# Patient Record
Sex: MALE | Race: WHITE | Employment: OTHER | URBAN - METROPOLITAN AREA
[De-identification: names, ages, dates, MRNs, and addresses within clinical notes are randomized per-mention and may not be internally consistent; named-entity substitution may affect disease eponyms.]

---

## 2017-01-30 ENCOUNTER — TELEPHONE (OUTPATIENT)
Dept: INTERNAL MEDICINE CLINIC | Age: 82
End: 2017-01-30

## 2017-03-27 RX ORDER — PREDNISONE 20 MG/1
TABLET ORAL
Qty: 60 TABLET | Refills: 4 | Status: SHIPPED | OUTPATIENT
Start: 2017-03-27 | End: 2017-04-25 | Stop reason: SDUPTHER

## 2017-04-25 ENCOUNTER — TELEPHONE (OUTPATIENT)
Dept: INTERNAL MEDICINE CLINIC | Age: 82
End: 2017-04-25

## 2017-04-25 RX ORDER — PREDNISONE 20 MG/1
TABLET ORAL
Qty: 60 TABLET | Refills: 2 | Status: SHIPPED | OUTPATIENT
Start: 2017-04-25 | End: 2017-04-26 | Stop reason: SDUPTHER

## 2017-04-26 ENCOUNTER — TELEPHONE (OUTPATIENT)
Dept: INTERNAL MEDICINE CLINIC | Age: 82
End: 2017-04-26

## 2017-04-26 RX ORDER — PREDNISONE 20 MG/1
TABLET ORAL
Qty: 120 TABLET | Refills: 2 | Status: SHIPPED | OUTPATIENT
Start: 2017-04-26 | End: 2017-04-26 | Stop reason: SDUPTHER

## 2017-04-26 RX ORDER — PREDNISONE 20 MG/1
TABLET ORAL
Qty: 120 TABLET | Refills: 2 | Status: ON HOLD | OUTPATIENT
Start: 2017-04-26 | End: 2018-05-02 | Stop reason: HOSPADM

## 2018-04-18 PROBLEM — I95.9 HYPOTENSION: Status: ACTIVE | Noted: 2018-04-18

## 2018-04-18 PROBLEM — D50.0 BLOOD LOSS ANEMIA: Status: ACTIVE | Noted: 2018-04-18

## 2018-04-18 PROBLEM — Z87.19 HISTORY OF ESOPHAGEAL STRICTURE: Status: ACTIVE | Noted: 2018-04-18

## 2018-04-18 PROBLEM — K92.2 GI BLEEDING: Status: ACTIVE | Noted: 2018-04-18

## 2018-04-18 PROBLEM — I45.2 RBBB (RIGHT BUNDLE BRANCH BLOCK WITH LEFT ANTERIOR FASCICULAR BLOCK): Status: ACTIVE | Noted: 2018-04-18

## 2018-04-19 ENCOUNTER — TELEPHONE (OUTPATIENT)
Dept: INTERNAL MEDICINE CLINIC | Age: 83
End: 2018-04-19

## 2018-04-19 PROBLEM — K26.9 DUODENAL ULCER: Status: ACTIVE | Noted: 2018-04-19

## 2018-04-19 PROBLEM — D50.0 BLOOD LOSS ANEMIA: Status: RESOLVED | Noted: 2018-04-18 | Resolved: 2018-04-19

## 2018-04-20 ENCOUNTER — TELEPHONE (OUTPATIENT)
Dept: INTERNAL MEDICINE CLINIC | Age: 83
End: 2018-04-20

## 2018-04-23 PROBLEM — K92.2 GI BLEEDING: Chronic | Status: ACTIVE | Noted: 2018-04-18

## 2018-04-23 PROBLEM — K92.2 UGI BLEED: Status: ACTIVE | Noted: 2018-04-23

## 2018-04-25 ENCOUNTER — TELEPHONE (OUTPATIENT)
Dept: INTERNAL MEDICINE CLINIC | Age: 83
End: 2018-04-25

## 2018-04-26 PROBLEM — J96.01 ACUTE HYPOXEMIC RESPIRATORY FAILURE (HCC): Status: ACTIVE | Noted: 2018-04-26

## 2018-05-01 PROBLEM — J96.01 ACUTE HYPOXEMIC RESPIRATORY FAILURE (HCC): Status: RESOLVED | Noted: 2018-04-26 | Resolved: 2018-05-01

## 2018-05-01 PROBLEM — I47.29 NON-SUSTAINED VENTRICULAR TACHYCARDIA: Status: ACTIVE | Noted: 2018-05-01

## 2018-05-02 ENCOUNTER — TELEPHONE (OUTPATIENT)
Dept: INTERNAL MEDICINE CLINIC | Age: 83
End: 2018-05-02

## 2018-05-03 ENCOUNTER — CARE COORDINATION (OUTPATIENT)
Dept: CASE MANAGEMENT | Age: 83
End: 2018-05-03

## 2018-05-03 ENCOUNTER — TELEPHONE (OUTPATIENT)
Dept: INTERNAL MEDICINE CLINIC | Age: 83
End: 2018-05-03

## 2018-05-09 ENCOUNTER — CARE COORDINATION (OUTPATIENT)
Dept: CASE MANAGEMENT | Age: 83
End: 2018-05-09

## 2018-05-15 ENCOUNTER — CARE COORDINATION (OUTPATIENT)
Dept: CASE MANAGEMENT | Age: 83
End: 2018-05-15

## 2018-05-18 RX ORDER — ATORVASTATIN CALCIUM 10 MG/1
10 TABLET, FILM COATED ORAL DAILY
Qty: 30 TABLET | Refills: 5 | Status: SHIPPED | OUTPATIENT
Start: 2018-05-18 | End: 2018-09-26 | Stop reason: SDUPTHER

## 2018-05-18 RX ORDER — METOPROLOL SUCCINATE 25 MG/1
25 TABLET, EXTENDED RELEASE ORAL DAILY
Qty: 30 TABLET | Refills: 5 | Status: SHIPPED | OUTPATIENT
Start: 2018-05-18 | End: 2018-09-26 | Stop reason: SDUPTHER

## 2018-05-23 ENCOUNTER — OFFICE VISIT (OUTPATIENT)
Dept: INTERNAL MEDICINE CLINIC | Age: 83
End: 2018-05-23

## 2018-05-23 VITALS
SYSTOLIC BLOOD PRESSURE: 138 MMHG | BODY MASS INDEX: 27.88 KG/M2 | DIASTOLIC BLOOD PRESSURE: 74 MMHG | HEART RATE: 70 BPM | OXYGEN SATURATION: 95 % | WEIGHT: 178 LBS | RESPIRATION RATE: 16 BRPM

## 2018-05-23 DIAGNOSIS — D62 ACUTE BLOOD LOSS ANEMIA: ICD-10-CM

## 2018-05-23 DIAGNOSIS — K26.9 DUODENAL ULCER: ICD-10-CM

## 2018-05-23 DIAGNOSIS — J18.9 PNEUMONIA OF LEFT LOWER LOBE DUE TO INFECTIOUS ORGANISM: ICD-10-CM

## 2018-05-23 DIAGNOSIS — K92.2 UGI BLEED: Primary | ICD-10-CM

## 2018-05-23 LAB
BASOPHILS ABSOLUTE: 0 K/UL (ref 0–0.2)
BASOPHILS RELATIVE PERCENT: 0.7 %
EOSINOPHILS ABSOLUTE: 0.2 K/UL (ref 0–0.6)
EOSINOPHILS RELATIVE PERCENT: 6.9 %
HCT VFR BLD CALC: 28.6 % (ref 40.5–52.5)
HEMOGLOBIN: 9.3 G/DL (ref 13.5–17.5)
LYMPHOCYTES ABSOLUTE: 1 K/UL (ref 1–5.1)
LYMPHOCYTES RELATIVE PERCENT: 31.9 %
MCH RBC QN AUTO: 27.2 PG (ref 26–34)
MCHC RBC AUTO-ENTMCNC: 32.6 G/DL (ref 31–36)
MCV RBC AUTO: 83.6 FL (ref 80–100)
MONOCYTES ABSOLUTE: 0.3 K/UL (ref 0–1.3)
MONOCYTES RELATIVE PERCENT: 8.7 %
NEUTROPHILS ABSOLUTE: 1.7 K/UL (ref 1.7–7.7)
NEUTROPHILS RELATIVE PERCENT: 51.8 %
PDW BLD-RTO: 24.2 % (ref 12.4–15.4)
PLATELET # BLD: 155 K/UL (ref 135–450)
PMV BLD AUTO: 8.6 FL (ref 5–10.5)
RBC # BLD: 3.42 M/UL (ref 4.2–5.9)
WBC # BLD: 3.3 K/UL (ref 4–11)

## 2018-05-23 PROCEDURE — 1036F TOBACCO NON-USER: CPT | Performed by: INTERNAL MEDICINE

## 2018-05-23 PROCEDURE — G8510 SCR DEP NEG, NO PLAN REQD: HCPCS | Performed by: INTERNAL MEDICINE

## 2018-05-23 PROCEDURE — 4040F PNEUMOC VAC/ADMIN/RCVD: CPT | Performed by: INTERNAL MEDICINE

## 2018-05-23 PROCEDURE — 3288F FALL RISK ASSESSMENT DOCD: CPT | Performed by: INTERNAL MEDICINE

## 2018-05-23 PROCEDURE — G8419 CALC BMI OUT NRM PARAM NOF/U: HCPCS | Performed by: INTERNAL MEDICINE

## 2018-05-23 PROCEDURE — 99213 OFFICE O/P EST LOW 20 MIN: CPT | Performed by: INTERNAL MEDICINE

## 2018-05-23 PROCEDURE — 1123F ACP DISCUSS/DSCN MKR DOCD: CPT | Performed by: INTERNAL MEDICINE

## 2018-05-23 PROCEDURE — G8427 DOCREV CUR MEDS BY ELIG CLIN: HCPCS | Performed by: INTERNAL MEDICINE

## 2018-05-23 PROCEDURE — 36415 COLL VENOUS BLD VENIPUNCTURE: CPT | Performed by: INTERNAL MEDICINE

## 2018-05-23 PROCEDURE — 1111F DSCHRG MED/CURRENT MED MERGE: CPT | Performed by: INTERNAL MEDICINE

## 2018-05-23 ASSESSMENT — PATIENT HEALTH QUESTIONNAIRE - PHQ9
SUM OF ALL RESPONSES TO PHQ9 QUESTIONS 1 & 2: 0
2. FEELING DOWN, DEPRESSED OR HOPELESS: 0
1. LITTLE INTEREST OR PLEASURE IN DOING THINGS: 0
SUM OF ALL RESPONSES TO PHQ QUESTIONS 1-9: 0

## 2018-05-23 ASSESSMENT — ENCOUNTER SYMPTOMS
RESPIRATORY NEGATIVE: 1
GASTROINTESTINAL NEGATIVE: 1

## 2018-09-26 RX ORDER — METOPROLOL SUCCINATE 25 MG/1
TABLET, EXTENDED RELEASE ORAL
Qty: 30 TABLET | Refills: 5 | Status: SHIPPED | OUTPATIENT
Start: 2018-09-26 | End: 2019-06-10 | Stop reason: SDUPTHER

## 2018-09-26 RX ORDER — ATORVASTATIN CALCIUM 10 MG/1
TABLET, FILM COATED ORAL
Qty: 30 TABLET | Refills: 5 | Status: ON HOLD | OUTPATIENT
Start: 2018-09-26 | End: 2019-05-15 | Stop reason: SDUPTHER

## 2018-11-21 ENCOUNTER — TELEPHONE (OUTPATIENT)
Dept: INTERNAL MEDICINE CLINIC | Age: 83
End: 2018-11-21

## 2019-01-28 ENCOUNTER — OFFICE VISIT (OUTPATIENT)
Dept: INTERNAL MEDICINE CLINIC | Age: 84
End: 2019-01-28
Payer: MEDICARE

## 2019-01-28 VITALS
RESPIRATION RATE: 16 BRPM | HEART RATE: 106 BPM | SYSTOLIC BLOOD PRESSURE: 122 MMHG | WEIGHT: 186 LBS | BODY MASS INDEX: 29.19 KG/M2 | DIASTOLIC BLOOD PRESSURE: 80 MMHG | HEIGHT: 67 IN | OXYGEN SATURATION: 98 %

## 2019-01-28 DIAGNOSIS — Z11.1 VISIT FOR TB SKIN TEST: ICD-10-CM

## 2019-01-28 DIAGNOSIS — M19.90 ARTHRITIS: ICD-10-CM

## 2019-01-28 DIAGNOSIS — G70.00 MYASTHENIA GRAVIS (HCC): ICD-10-CM

## 2019-01-28 DIAGNOSIS — Z02.2 ENCOUNTER FOR EXAMINATION FOR ADMISSION TO NURSING HOME: Primary | ICD-10-CM

## 2019-01-28 DIAGNOSIS — E78.2 MIXED HYPERLIPIDEMIA: ICD-10-CM

## 2019-01-28 DIAGNOSIS — Q61.3 POLYCYSTIC KIDNEY: ICD-10-CM

## 2019-01-28 DIAGNOSIS — Z87.19 HISTORY OF ESOPHAGEAL STRICTURE: ICD-10-CM

## 2019-01-28 DIAGNOSIS — I10 ESSENTIAL HYPERTENSION: ICD-10-CM

## 2019-01-28 PROCEDURE — G8427 DOCREV CUR MEDS BY ELIG CLIN: HCPCS | Performed by: INTERNAL MEDICINE

## 2019-01-28 PROCEDURE — 99214 OFFICE O/P EST MOD 30 MIN: CPT | Performed by: INTERNAL MEDICINE

## 2019-01-28 PROCEDURE — 86580 TB INTRADERMAL TEST: CPT | Performed by: INTERNAL MEDICINE

## 2019-01-28 PROCEDURE — 1036F TOBACCO NON-USER: CPT | Performed by: INTERNAL MEDICINE

## 2019-01-28 PROCEDURE — 4040F PNEUMOC VAC/ADMIN/RCVD: CPT | Performed by: INTERNAL MEDICINE

## 2019-01-28 PROCEDURE — 1123F ACP DISCUSS/DSCN MKR DOCD: CPT | Performed by: INTERNAL MEDICINE

## 2019-01-28 PROCEDURE — G8484 FLU IMMUNIZE NO ADMIN: HCPCS | Performed by: INTERNAL MEDICINE

## 2019-01-28 PROCEDURE — 1101F PT FALLS ASSESS-DOCD LE1/YR: CPT | Performed by: INTERNAL MEDICINE

## 2019-01-28 PROCEDURE — G8419 CALC BMI OUT NRM PARAM NOF/U: HCPCS | Performed by: INTERNAL MEDICINE

## 2019-01-28 RX ORDER — METOPROLOL SUCCINATE 25 MG/1
TABLET, EXTENDED RELEASE ORAL
Qty: 30 TABLET | Refills: 5 | Status: CANCELLED | OUTPATIENT
Start: 2019-01-28

## 2019-01-28 RX ORDER — PREDNISONE 20 MG/1
20 TABLET ORAL DAILY PRN
Qty: 90 TABLET | Refills: 1 | Status: ON HOLD | OUTPATIENT
Start: 2019-01-28 | End: 2019-05-09 | Stop reason: HOSPADM

## 2019-01-28 RX ORDER — PREDNISONE 20 MG/1
TABLET ORAL
COMMUNITY
End: 2019-01-28 | Stop reason: SDUPTHER

## 2019-01-29 ASSESSMENT — ENCOUNTER SYMPTOMS
BACK PAIN: 1
EYES NEGATIVE: 1
GASTROINTESTINAL NEGATIVE: 1
RESPIRATORY NEGATIVE: 1

## 2019-01-30 ENCOUNTER — TELEPHONE (OUTPATIENT)
Dept: INTERNAL MEDICINE CLINIC | Age: 84
End: 2019-01-30

## 2019-05-07 ENCOUNTER — APPOINTMENT (OUTPATIENT)
Dept: CT IMAGING | Age: 84
DRG: 243 | End: 2019-05-07
Payer: MEDICARE

## 2019-05-07 ENCOUNTER — APPOINTMENT (OUTPATIENT)
Dept: GENERAL RADIOLOGY | Age: 84
DRG: 243 | End: 2019-05-07
Payer: MEDICARE

## 2019-05-07 ENCOUNTER — HOSPITAL ENCOUNTER (INPATIENT)
Age: 84
LOS: 2 days | Discharge: NURSING, REHAB OR LONG-TERM CARE | DRG: 243 | End: 2019-05-09
Attending: EMERGENCY MEDICINE | Admitting: INTERNAL MEDICINE
Payer: MEDICARE

## 2019-05-07 ENCOUNTER — TELEPHONE (OUTPATIENT)
Dept: INTERNAL MEDICINE CLINIC | Age: 84
End: 2019-05-07

## 2019-05-07 DIAGNOSIS — D72.819 LEUKOPENIA, UNSPECIFIED TYPE: ICD-10-CM

## 2019-05-07 DIAGNOSIS — S60.222A CONTUSION OF LEFT HAND, INITIAL ENCOUNTER: ICD-10-CM

## 2019-05-07 DIAGNOSIS — S02.85XA CLOSED FRACTURE OF ORBITAL WALL, INITIAL ENCOUNTER (HCC): ICD-10-CM

## 2019-05-07 DIAGNOSIS — S80.02XA CONTUSION OF LEFT KNEE, INITIAL ENCOUNTER: ICD-10-CM

## 2019-05-07 DIAGNOSIS — S09.90XA CLOSED HEAD INJURY, INITIAL ENCOUNTER: ICD-10-CM

## 2019-05-07 DIAGNOSIS — R55 SYNCOPE AND COLLAPSE: Primary | ICD-10-CM

## 2019-05-07 DIAGNOSIS — S52.125A CLOSED NONDISPLACED FRACTURE OF HEAD OF LEFT RADIUS, INITIAL ENCOUNTER: ICD-10-CM

## 2019-05-07 DIAGNOSIS — R00.1 BRADYCARDIA: ICD-10-CM

## 2019-05-07 DIAGNOSIS — H05.232 PERIORBITAL HEMATOMA OF LEFT EYE: ICD-10-CM

## 2019-05-07 PROBLEM — N18.30 STAGE 3 CHRONIC KIDNEY DISEASE (HCC): Status: ACTIVE | Noted: 2019-05-07

## 2019-05-07 PROBLEM — R73.9 HYPERGLYCEMIA: Status: ACTIVE | Noted: 2019-05-07

## 2019-05-07 PROBLEM — I48.91 ATRIAL FIBRILLATION WITH SLOW VENTRICULAR RESPONSE (HCC): Status: ACTIVE | Noted: 2019-05-07

## 2019-05-07 PROBLEM — D70.9 NEUTROPENIA (HCC): Status: ACTIVE | Noted: 2019-05-07

## 2019-05-07 LAB
A/G RATIO: 1.4 (ref 1.1–2.2)
ALBUMIN SERPL-MCNC: 3.8 G/DL (ref 3.4–5)
ALP BLD-CCNC: 95 U/L (ref 40–129)
ALT SERPL-CCNC: 8 U/L (ref 10–40)
ANION GAP SERPL CALCULATED.3IONS-SCNC: 14 MMOL/L (ref 3–16)
ANISOCYTOSIS: ABNORMAL
AST SERPL-CCNC: 11 U/L (ref 15–37)
BANDED NEUTROPHILS RELATIVE PERCENT: 2 % (ref 0–7)
BASOPHILS ABSOLUTE: 0 K/UL (ref 0–0.2)
BASOPHILS ABSOLUTE: 0 K/UL (ref 0–0.2)
BASOPHILS RELATIVE PERCENT: 0 %
BASOPHILS RELATIVE PERCENT: 0.1 %
BILIRUB SERPL-MCNC: 0.7 MG/DL (ref 0–1)
BILIRUBIN URINE: NEGATIVE
BLOOD, URINE: NEGATIVE
BUN BLDV-MCNC: 26 MG/DL (ref 7–20)
CALCIUM SERPL-MCNC: 8.9 MG/DL (ref 8.3–10.6)
CHLORIDE BLD-SCNC: 109 MMOL/L (ref 99–110)
CLARITY: CLEAR
CO2: 20 MMOL/L (ref 21–32)
COLOR: YELLOW
CREAT SERPL-MCNC: 1.8 MG/DL (ref 0.8–1.3)
CRENATED RBC'S: ABNORMAL
EKG ATRIAL RATE: 39 BPM
EKG DIAGNOSIS: NORMAL
EKG Q-T INTERVAL: 614 MS
EKG QRS DURATION: 134 MS
EKG QTC CALCULATION (BAZETT): 454 MS
EKG R AXIS: -31 DEGREES
EKG T AXIS: -17 DEGREES
EKG VENTRICULAR RATE: 33 BPM
EOSINOPHILS ABSOLUTE: 0 K/UL (ref 0–0.6)
EOSINOPHILS ABSOLUTE: 0 K/UL (ref 0–0.6)
EOSINOPHILS RELATIVE PERCENT: 0 %
EOSINOPHILS RELATIVE PERCENT: 1 %
EPITHELIAL CELLS, UA: 1 /HPF (ref 0–5)
GFR AFRICAN AMERICAN: 43
GFR NON-AFRICAN AMERICAN: 36
GLOBULIN: 2.7 G/DL
GLUCOSE BLD-MCNC: 133 MG/DL (ref 70–99)
GLUCOSE BLD-MCNC: 164 MG/DL (ref 70–99)
GLUCOSE BLD-MCNC: 172 MG/DL (ref 70–99)
GLUCOSE BLD-MCNC: 202 MG/DL (ref 70–99)
GLUCOSE URINE: NEGATIVE MG/DL
HCT VFR BLD CALC: 34.4 % (ref 40.5–52.5)
HCT VFR BLD CALC: 35 % (ref 40.5–52.5)
HEMATOLOGY PATH CONSULT: NORMAL
HEMATOLOGY PATH CONSULT: YES
HEMOGLOBIN: 11.4 G/DL (ref 13.5–17.5)
HEMOGLOBIN: 11.5 G/DL (ref 13.5–17.5)
HYALINE CASTS: 2 /LPF (ref 0–8)
HYPOCHROMIA: ABNORMAL
KETONES, URINE: ABNORMAL MG/DL
LACTIC ACID: 1.8 MMOL/L (ref 0.4–2)
LACTIC ACID: 2.4 MMOL/L (ref 0.4–2)
LEUKOCYTE ESTERASE, URINE: ABNORMAL
LIPASE: 17 U/L (ref 13–60)
LYMPHOCYTES ABSOLUTE: 0.3 K/UL (ref 1–5.1)
LYMPHOCYTES ABSOLUTE: 0.5 K/UL (ref 1–5.1)
LYMPHOCYTES RELATIVE PERCENT: 15 %
LYMPHOCYTES RELATIVE PERCENT: 9.1 %
MAGNESIUM: 1.7 MG/DL (ref 1.8–2.4)
MCH RBC QN AUTO: 28.8 PG (ref 26–34)
MCH RBC QN AUTO: 28.9 PG (ref 26–34)
MCHC RBC AUTO-ENTMCNC: 32.9 G/DL (ref 31–36)
MCHC RBC AUTO-ENTMCNC: 33 G/DL (ref 31–36)
MCV RBC AUTO: 87.1 FL (ref 80–100)
MCV RBC AUTO: 87.6 FL (ref 80–100)
MICROSCOPIC EXAMINATION: YES
MONOCYTES ABSOLUTE: 0 K/UL (ref 0–1.3)
MONOCYTES ABSOLUTE: 0.1 K/UL (ref 0–1.3)
MONOCYTES RELATIVE PERCENT: 1 %
MONOCYTES RELATIVE PERCENT: 2.6 %
NEUTROPHILS ABSOLUTE: 1.4 K/UL (ref 1.7–7.7)
NEUTROPHILS ABSOLUTE: 4.5 K/UL (ref 1.7–7.7)
NEUTROPHILS RELATIVE PERCENT: 81 %
NEUTROPHILS RELATIVE PERCENT: 88.2 %
NITRITE, URINE: NEGATIVE
OCCULT BLOOD DIAGNOSTIC: NORMAL
OVALOCYTES: ABNORMAL
PDW BLD-RTO: 15.5 % (ref 12.4–15.4)
PDW BLD-RTO: 15.9 % (ref 12.4–15.4)
PERFORMED ON: ABNORMAL
PH UA: 6 (ref 5–8)
PHOSPHORUS: 3.8 MG/DL (ref 2.5–4.9)
PLATELET # BLD: 124 K/UL (ref 135–450)
PLATELET # BLD: 130 K/UL (ref 135–450)
PLATELET SLIDE REVIEW: ABNORMAL
PMV BLD AUTO: 9 FL (ref 5–10.5)
PMV BLD AUTO: 9.5 FL (ref 5–10.5)
POIKILOCYTES: ABNORMAL
POTASSIUM REFLEX MAGNESIUM: 4.5 MMOL/L (ref 3.5–5.1)
PRO-BNP: 3133 PG/ML (ref 0–449)
PROTEIN UA: 30 MG/DL
RBC # BLD: 3.95 M/UL (ref 4.2–5.9)
RBC # BLD: 4 M/UL (ref 4.2–5.9)
RBC UA: 3 /HPF (ref 0–4)
SLIDE REVIEW: ABNORMAL
SODIUM BLD-SCNC: 143 MMOL/L (ref 136–145)
SPECIFIC GRAVITY UA: 1.02 (ref 1–1.03)
TOTAL PROTEIN: 6.5 G/DL (ref 6.4–8.2)
TROPONIN: <0.01 NG/ML
URINE REFLEX TO CULTURE: YES
URINE TYPE: ABNORMAL
UROBILINOGEN, URINE: 1 E.U./DL
WBC # BLD: 1.7 K/UL (ref 4–11)
WBC # BLD: 5.1 K/UL (ref 4–11)
WBC UA: 2 /HPF (ref 0–5)

## 2019-05-07 PROCEDURE — 80053 COMPREHEN METABOLIC PANEL: CPT

## 2019-05-07 PROCEDURE — 99223 1ST HOSP IP/OBS HIGH 75: CPT | Performed by: NURSE PRACTITIONER

## 2019-05-07 PROCEDURE — 84100 ASSAY OF PHOSPHORUS: CPT

## 2019-05-07 PROCEDURE — 2500000003 HC RX 250 WO HCPCS

## 2019-05-07 PROCEDURE — C1898 LEAD, PMKR, OTHER THAN TRANS: HCPCS

## 2019-05-07 PROCEDURE — 81001 URINALYSIS AUTO W/SCOPE: CPT

## 2019-05-07 PROCEDURE — 85025 COMPLETE CBC W/AUTO DIFF WBC: CPT

## 2019-05-07 PROCEDURE — 2580000003 HC RX 258: Performed by: INTERNAL MEDICINE

## 2019-05-07 PROCEDURE — 0JH606Z INSERTION OF PACEMAKER, DUAL CHAMBER INTO CHEST SUBCUTANEOUS TISSUE AND FASCIA, OPEN APPROACH: ICD-10-PCS | Performed by: INTERNAL MEDICINE

## 2019-05-07 PROCEDURE — 73130 X-RAY EXAM OF HAND: CPT

## 2019-05-07 PROCEDURE — 96375 TX/PRO/DX INJ NEW DRUG ADDON: CPT

## 2019-05-07 PROCEDURE — 70450 CT HEAD/BRAIN W/O DYE: CPT

## 2019-05-07 PROCEDURE — 6360000002 HC RX W HCPCS: Performed by: INTERNAL MEDICINE

## 2019-05-07 PROCEDURE — 6360000002 HC RX W HCPCS: Performed by: PHYSICIAN ASSISTANT

## 2019-05-07 PROCEDURE — 83605 ASSAY OF LACTIC ACID: CPT

## 2019-05-07 PROCEDURE — 73080 X-RAY EXAM OF ELBOW: CPT

## 2019-05-07 PROCEDURE — 93005 ELECTROCARDIOGRAM TRACING: CPT | Performed by: PHYSICIAN ASSISTANT

## 2019-05-07 PROCEDURE — C1785 PMKR, DUAL, RATE-RESP: HCPCS

## 2019-05-07 PROCEDURE — 93005 ELECTROCARDIOGRAM TRACING: CPT | Performed by: INTERNAL MEDICINE

## 2019-05-07 PROCEDURE — C1894 INTRO/SHEATH, NON-LASER: HCPCS

## 2019-05-07 PROCEDURE — 2000000000 HC ICU R&B

## 2019-05-07 PROCEDURE — 83880 ASSAY OF NATRIURETIC PEPTIDE: CPT

## 2019-05-07 PROCEDURE — 99153 MOD SED SAME PHYS/QHP EA: CPT | Performed by: FAMILY MEDICINE

## 2019-05-07 PROCEDURE — 6370000000 HC RX 637 (ALT 250 FOR IP): Performed by: INTERNAL MEDICINE

## 2019-05-07 PROCEDURE — 2580000003 HC RX 258: Performed by: NURSE PRACTITIONER

## 2019-05-07 PROCEDURE — 72125 CT NECK SPINE W/O DYE: CPT

## 2019-05-07 PROCEDURE — 02H63JZ INSERTION OF PACEMAKER LEAD INTO RIGHT ATRIUM, PERCUTANEOUS APPROACH: ICD-10-PCS | Performed by: INTERNAL MEDICINE

## 2019-05-07 PROCEDURE — G0328 FECAL BLOOD SCRN IMMUNOASSAY: HCPCS

## 2019-05-07 PROCEDURE — 87086 URINE CULTURE/COLONY COUNT: CPT

## 2019-05-07 PROCEDURE — 02HK3JZ INSERTION OF PACEMAKER LEAD INTO RIGHT VENTRICLE, PERCUTANEOUS APPROACH: ICD-10-PCS | Performed by: INTERNAL MEDICINE

## 2019-05-07 PROCEDURE — 71045 X-RAY EXAM CHEST 1 VIEW: CPT

## 2019-05-07 PROCEDURE — 84484 ASSAY OF TROPONIN QUANT: CPT

## 2019-05-07 PROCEDURE — 83735 ASSAY OF MAGNESIUM: CPT

## 2019-05-07 PROCEDURE — 96374 THER/PROPH/DIAG INJ IV PUSH: CPT

## 2019-05-07 PROCEDURE — 94664 DEMO&/EVAL PT USE INHALER: CPT

## 2019-05-07 PROCEDURE — 99223 1ST HOSP IP/OBS HIGH 75: CPT | Performed by: INTERNAL MEDICINE

## 2019-05-07 PROCEDURE — 73560 X-RAY EXAM OF KNEE 1 OR 2: CPT

## 2019-05-07 PROCEDURE — 99152 MOD SED SAME PHYS/QHP 5/>YRS: CPT | Performed by: FAMILY MEDICINE

## 2019-05-07 PROCEDURE — 70486 CT MAXILLOFACIAL W/O DYE: CPT

## 2019-05-07 PROCEDURE — 2580000003 HC RX 258

## 2019-05-07 PROCEDURE — 93010 ELECTROCARDIOGRAM REPORT: CPT | Performed by: INTERNAL MEDICINE

## 2019-05-07 PROCEDURE — 6360000002 HC RX W HCPCS

## 2019-05-07 PROCEDURE — 83690 ASSAY OF LIPASE: CPT

## 2019-05-07 PROCEDURE — 33208 INSRT HEART PM ATRIAL & VENT: CPT | Performed by: FAMILY MEDICINE

## 2019-05-07 PROCEDURE — 6360000002 HC RX W HCPCS: Performed by: EMERGENCY MEDICINE

## 2019-05-07 PROCEDURE — 36415 COLL VENOUS BLD VENIPUNCTURE: CPT

## 2019-05-07 PROCEDURE — 99291 CRITICAL CARE FIRST HOUR: CPT

## 2019-05-07 RX ORDER — DOXAZOSIN MESYLATE 4 MG/1
4 TABLET ORAL NIGHTLY
Status: DISCONTINUED | OUTPATIENT
Start: 2019-05-07 | End: 2019-05-09 | Stop reason: HOSPADM

## 2019-05-07 RX ORDER — DEXTROSE MONOHYDRATE 25 G/50ML
12.5 INJECTION, SOLUTION INTRAVENOUS PRN
Status: DISCONTINUED | OUTPATIENT
Start: 2019-05-07 | End: 2019-05-09 | Stop reason: HOSPADM

## 2019-05-07 RX ORDER — HYDROCODONE BITARTRATE AND ACETAMINOPHEN 5; 325 MG/1; MG/1
1 TABLET ORAL EVERY 6 HOURS PRN
Status: DISCONTINUED | OUTPATIENT
Start: 2019-05-07 | End: 2019-05-09 | Stop reason: HOSPADM

## 2019-05-07 RX ORDER — PANTOPRAZOLE SODIUM 40 MG/1
40 TABLET, DELAYED RELEASE ORAL DAILY
Status: DISCONTINUED | OUTPATIENT
Start: 2019-05-07 | End: 2019-05-09 | Stop reason: HOSPADM

## 2019-05-07 RX ORDER — METOPROLOL SUCCINATE 25 MG/1
25 TABLET, EXTENDED RELEASE ORAL DAILY
Status: DISCONTINUED | OUTPATIENT
Start: 2019-05-07 | End: 2019-05-09 | Stop reason: HOSPADM

## 2019-05-07 RX ORDER — DEXTROSE MONOHYDRATE 50 MG/ML
100 INJECTION, SOLUTION INTRAVENOUS PRN
Status: DISCONTINUED | OUTPATIENT
Start: 2019-05-07 | End: 2019-05-09 | Stop reason: HOSPADM

## 2019-05-07 RX ORDER — SODIUM CHLORIDE 0.9 % (FLUSH) 0.9 %
10 SYRINGE (ML) INJECTION PRN
Status: DISCONTINUED | OUTPATIENT
Start: 2019-05-07 | End: 2019-05-09 | Stop reason: HOSPADM

## 2019-05-07 RX ORDER — ONDANSETRON 2 MG/ML
4 INJECTION INTRAMUSCULAR; INTRAVENOUS EVERY 6 HOURS PRN
Status: DISCONTINUED | OUTPATIENT
Start: 2019-05-07 | End: 2019-05-09 | Stop reason: HOSPADM

## 2019-05-07 RX ORDER — SODIUM CHLORIDE 0.9 % (FLUSH) 0.9 %
10 SYRINGE (ML) INJECTION PRN
Status: DISCONTINUED | OUTPATIENT
Start: 2019-05-07 | End: 2019-05-07 | Stop reason: SDUPTHER

## 2019-05-07 RX ORDER — ATROPINE SULFATE 0.1 MG/ML
0.5 INJECTION INTRAVENOUS ONCE
Status: COMPLETED | OUTPATIENT
Start: 2019-05-07 | End: 2019-05-07

## 2019-05-07 RX ORDER — NICOTINE POLACRILEX 4 MG
15 LOZENGE BUCCAL PRN
Status: DISCONTINUED | OUTPATIENT
Start: 2019-05-07 | End: 2019-05-09 | Stop reason: HOSPADM

## 2019-05-07 RX ORDER — SODIUM CHLORIDE 0.9 % (FLUSH) 0.9 %
10 SYRINGE (ML) INJECTION EVERY 12 HOURS SCHEDULED
Status: DISCONTINUED | OUTPATIENT
Start: 2019-05-07 | End: 2019-05-09 | Stop reason: HOSPADM

## 2019-05-07 RX ORDER — ATORVASTATIN CALCIUM 10 MG/1
10 TABLET, FILM COATED ORAL DAILY
Status: DISCONTINUED | OUTPATIENT
Start: 2019-05-07 | End: 2019-05-09 | Stop reason: HOSPADM

## 2019-05-07 RX ORDER — ACETAMINOPHEN 325 MG/1
650 TABLET ORAL EVERY 4 HOURS PRN
Status: DISCONTINUED | OUTPATIENT
Start: 2019-05-07 | End: 2019-05-09 | Stop reason: HOSPADM

## 2019-05-07 RX ORDER — SODIUM CHLORIDE 0.9 % (FLUSH) 0.9 %
10 SYRINGE (ML) INJECTION EVERY 12 HOURS SCHEDULED
Status: DISCONTINUED | OUTPATIENT
Start: 2019-05-07 | End: 2019-05-07 | Stop reason: SDUPTHER

## 2019-05-07 RX ADMIN — HYDROCODONE BITARTRATE AND ACETAMINOPHEN 1 TABLET: 5; 325 TABLET ORAL at 17:35

## 2019-05-07 RX ADMIN — HYDROCORTISONE SODIUM SUCCINATE 100 MG: 100 INJECTION, POWDER, FOR SOLUTION INTRAMUSCULAR; INTRAVENOUS at 07:41

## 2019-05-07 RX ADMIN — HYDROCODONE BITARTRATE AND ACETAMINOPHEN 1 TABLET: 5; 325 TABLET ORAL at 23:51

## 2019-05-07 RX ADMIN — Medication 10 ML: at 11:32

## 2019-05-07 RX ADMIN — ATROPINE SULFATE 0.5 MG: 0.1 INJECTION PARENTERAL at 06:43

## 2019-05-07 RX ADMIN — METOPROLOL SUCCINATE 25 MG: 25 TABLET, EXTENDED RELEASE ORAL at 21:16

## 2019-05-07 RX ADMIN — INSULIN LISPRO 1 UNITS: 100 INJECTION, SOLUTION INTRAVENOUS; SUBCUTANEOUS at 21:11

## 2019-05-07 RX ADMIN — HYDROCORTISONE SODIUM SUCCINATE 100 MG: 100 INJECTION, POWDER, FOR SOLUTION INTRAMUSCULAR; INTRAVENOUS at 17:27

## 2019-05-07 RX ADMIN — Medication 10 ML: at 21:12

## 2019-05-07 RX ADMIN — PANTOPRAZOLE SODIUM 40 MG: 40 TABLET, DELAYED RELEASE ORAL at 11:27

## 2019-05-07 RX ADMIN — INSULIN LISPRO 1 UNITS: 100 INJECTION, SOLUTION INTRAVENOUS; SUBCUTANEOUS at 11:35

## 2019-05-07 RX ADMIN — HYDROCORTISONE SODIUM SUCCINATE 100 MG: 100 INJECTION, POWDER, FOR SOLUTION INTRAMUSCULAR; INTRAVENOUS at 23:51

## 2019-05-07 RX ADMIN — DOXAZOSIN 4 MG: 4 TABLET ORAL at 21:11

## 2019-05-07 RX ADMIN — Medication 2 G: at 21:17

## 2019-05-07 RX ADMIN — ATROPINE SULFATE 0.5 MG: 0.1 INJECTION, SOLUTION INTRAVENOUS at 06:47

## 2019-05-07 ASSESSMENT — PAIN SCALES - GENERAL
PAINLEVEL_OUTOF10: 6
PAINLEVEL_OUTOF10: 7
PAINLEVEL_OUTOF10: 5
PAINLEVEL_OUTOF10: 2
PAINLEVEL_OUTOF10: 7
PAINLEVEL_OUTOF10: 5
PAINLEVEL_OUTOF10: 8
PAINLEVEL_OUTOF10: 5
PAINLEVEL_OUTOF10: 2
PAINLEVEL_OUTOF10: 5
PAINLEVEL_OUTOF10: 5

## 2019-05-07 ASSESSMENT — PAIN DESCRIPTION - PROGRESSION
CLINICAL_PROGRESSION: NOT CHANGED
CLINICAL_PROGRESSION: GRADUALLY WORSENING

## 2019-05-07 ASSESSMENT — PAIN DESCRIPTION - ONSET
ONSET: ON-GOING
ONSET: ON-GOING

## 2019-05-07 ASSESSMENT — PAIN DESCRIPTION - DESCRIPTORS
DESCRIPTORS: SHARP;TINGLING
DESCRIPTORS: SHARP;TINGLING
DESCRIPTORS: ACHING

## 2019-05-07 ASSESSMENT — PAIN DESCRIPTION - FREQUENCY
FREQUENCY: CONTINUOUS

## 2019-05-07 ASSESSMENT — PAIN DESCRIPTION - PAIN TYPE
TYPE: ACUTE PAIN

## 2019-05-07 ASSESSMENT — PAIN DESCRIPTION - ORIENTATION
ORIENTATION: LEFT;RIGHT
ORIENTATION: RIGHT;LEFT
ORIENTATION: LEFT;RIGHT

## 2019-05-07 ASSESSMENT — PAIN DESCRIPTION - LOCATION
LOCATION: HAND
LOCATION: HAND;ELBOW
LOCATION: ELBOW
LOCATION: HAND

## 2019-05-07 ASSESSMENT — PAIN - FUNCTIONAL ASSESSMENT
PAIN_FUNCTIONAL_ASSESSMENT: PREVENTS OR INTERFERES WITH MANY ACTIVE NOT PASSIVE ACTIVITIES
PAIN_FUNCTIONAL_ASSESSMENT: ACTIVITIES ARE NOT PREVENTED

## 2019-05-07 NOTE — ED NOTES
[facility] [address] [facility]      Martyn Felty, RN  05/07/19 9370

## 2019-05-07 NOTE — PROGRESS NOTES
0930- patient admitted to 2116 from ED, report received, patient Afib on monitor w/ HR in 20's-30's, BP stable. Patient alert and oriented, some bruising and abrasions noted r/t patient's fall at home. 1- April, NP w/ EP to bedside, talking w/ patient and patient's daughter. Plan for pacemaker placement this afternoon, labs ordered. 1230- plan of care reviewed w/ patient and family, discussed results of scans/xrays. Consent signed for pacemaker placement. Patient c/o pain in hands, declines tylenol at this time. 1305- patient w/ Vfib/Vtach alarm on monitor, back into SB and patient remains alert and responsive, BP stable. 4250 [facility]/ [facility] called to notify of arrhythmia noted on monitor, will updated Dr. Jhonny Trujillo, provided printed EKG strips to her. 1430- abrasion/skin tears to left elbow and left knee cleaned and dressed, patient repositioned, needs met  [phone]- patient to cath lab w/ cath team  1700- return from cath lab, report received. Patient awake and alert, pressure dressing in place on left chest. Vitals stable, AV paced on monitor. Patient continues to have pain in hands. 46- Dr. Ashly Hart paged r/t patient's ongoing pain in hands. Dinner ordered for patient.   1725- call back received from Dr. Ashly Hart, new order for Demetria Lincoln.  1735- medicated w/ Norco per order  [phone]- patient eating dinner w/ assist of br[facility]ney  1841- patient hypertensive, Dr. Jhonny Trujillo paged  9834- call back received from Dr. Jhonny Trujillo, updated, see new orders  1930- report given to Meredith Lopez RN, to assume care    Electronically signed by Angie Carreon RN on 5/7/2019 at 7:52 PM

## 2019-05-07 NOTE — H&P
[facility]           [address], [address]                              HISTORY AND PHYSICAL    PATIENT NAME: Vera Self                   :        12/15/1929  MED REC NO:   [id number]                          ROOM:       321  ACCOUNT NO:   [de-identified]                           ADMIT DATE: 2019  PROVIDER:     Henrique Tobar MD    CHIEF COMPLAINT:  Syncope. HISTORY OF PRESENT ILLNESS:  This patient is an 63-year-old white male  who lives in the [place] [address]. He does say he has a history of atrial fibrillation, previous GI  bleeding, and myasthenia gravis. The patient states that last evening after lying down, he felt somewhat  \"dizzy\". He apparently sat up on the side of his bed and then  apparently had a syncopal spell. He fell to the floor, striking his  head and face. He was unresponsive for an indeterminate period of time  but came around and was able to find his cellphone and summon his  daughter early this morning. The patient was brought to the ER by the  paramedics where he was found to have significant bradycardia. The  patient's heart rate was in the 20s to 30s with slow atrial fibrillation  with a left anterior hemiblock and right bundle branch block. Currently, the patient is stable but he remains bradycardic. He is not  dizzy and denies any chest pain or shortness of breath. The patient is  on a small dose of a beta blocker which has been held. Further  evaluation in the Emergency Room revealed CT scanning of his facial  bones revealed an inferior orbital fracture as well as a possible left  radial head fracture. The patient does have significant ecchymosis  about the left eye.     So because of the syncope and significant bradycardia with probability  that this is cardiogenic syncope, the patient is being admitted to a  monitored bed for further evaluation and treatment. PAST MEDICAL HISTORY:  Reveals he does have a history of myasthenia  gravis. This is relatively mild and the patient only takes periodic  prednisone therapy for this. It is mostly manifested by diplopia. Other past medical history includes a history of hypertension,  hyperlipidemia, polycystic kidney disease, stage III chronic kidney  disease, history of GI bleeding with a duodenal ulcer and bleeding as  well as arthritis. PREVIOUS SURGICAL HISTORY:  Includes back surgery, bilateral cataract  surgery, cholecystectomy, colon surgery, fracture surgery, bilateral hip  replacements, renal stone surgery, multiple other orthopedic surgeries,  small bowel resection secondary to obstruction, tonsillectomy, and  endoscopies. MEDICATIONS AT HOME PRIOR TO ADMISSION:  Include metoprolol XL 25 mg  p.o. daily, atorvastatin, Protonix 40 mg daily, Cardura 4 mg daily. ALLERGIES:  He is apparently allergic to HYDRALAZINE, INDOMETHACIN,  MESTINON, and PIROXICAM all causing GI upset. SOCIAL HISTORY:  Reveals he does reside currently at the [address] [place] [address]. He is a former smoker. Does not drink alcohol. FAMILY HISTORY:  Reviewed and noncontributory. CURRENT REVIEW OF SYSTEMS:  Reveals he has had no fever nor chills. He  has no new vision or auditory difficulty. He has no chest pain. No  shortness of breath. He has had no GI upset. Denies any obvious  bleeding. He has no current urinary difficulties. He does have  arthritis in multiple joints. He has no obvious new skin lesions. He  has no current joint inflammation. He has no focal neurologic  complaints. He has had some dizziness and had a syncopal episode. PHYSICAL EXAMINATION:  VITAL SIGNS:  At this time, the patient's pulse is about 30 and  irregular, blood pressure 142/80, respiratory rate 16. GENERAL:  The patient is awake, alert, pleasant, and in no acute  distress.   HEENT:  His pupils are dilated but they do react to light. He does have  ecchymosis below the left eye and has some tenderness over the inferior  orbital ridge. NECK:  He has no jugular venous distention. CHEST:  Generally clear. CARDIAC:  Exam reveals the rhythm to be regular and the rate to be slow. No obvious murmur or gallop. ABDOMEN:  Slightly obese and soft without masses, tenderness, nor  organomegaly. EXTREMITIES:  Examination of the extremities reveals no significant  edema. He does have some deformities of his toes. Pulses were not  easily palpable but his feet are warm. He has no active joint  inflammation. SKIN:  He has no new skin lesions. NEUROLOGIC:  He has intact sensory and motor function. His gait  obviously was not tested at this time. ADMISSION LABORATORY DATA:  Reveals his troponin was less than 0.01. His proBNP was 3133. BMP revealed a BUN of 26 with a creatinine of 1.8. This is fairly steady. Total bicarbonate was 20, potassium was normal.   Chemistries are otherwise normal.  His hemoglobin was 11.5, his white  blood count was 1700 without a differential being performed, platelet  count is 159,611. Lactic acid was 2.4. EKG revealed atrial  fibrillation with a slow ventricular response. He had left anterior  hemiblock and right bundle branch block. Chest x-ray revealed no acute  abnormalities. CT scanning of the facial bones did reveal an inferior  orbital fracture. There was also some question of a possible left  nondisplaced radial head fracture. IMPRESSION:  1. Cardiogenic syncope. 2.  Atrial fibrillation with slow ventricular response. 3.  Closed fracture of the left inferior orbital ridge. 4.  Neutropenia. 5.  Myasthenia gravis. 6.  History of GI bleed. 7.  Stage III chronic kidney disease. 8.  Hyperglycemia. 9.  Polycystic kidney disease. 10.  Hypertension. 11.  Hyperlipidemia. PLAN AT THIS TIME:  Admit the patient to a monitored bed in the CCU.    Cardiology has been consulted and the

## 2019-05-07 NOTE — PROGRESS NOTES
EKG completed and placed in soft chart.     Electronically signed by Osmani Suggs on 5/7/2019 at 5:40 PM

## 2019-05-07 NOTE — ED PROVIDER NOTES
to the left elbow left hand and left knee. Neurological: He is alert and oriented to person, place, and time. Coordination normal.   Skin: Skin is warm and dry. He is not diaphoretic. Psychiatric: He has a normal mood and affect. His behavior is normal.   Nursing note and vitals reviewed. EKG visualized pulmonary interpreted by myself shows profound bradycardia at a rate of 33. Right bundle branch block. Axis -31. There is one escape PVC. I do not see any P waves. Xr Elbow Left (min 3 Views)    Result Date: 5/7/2019  EXAMINATION: 3 XRAY VIEWS OF THE LEFT ELBOW 5/7/2019 6:24 am COMPARISON: None. HISTORY: ORDERING SYSTEM PROVIDED HISTORY: left elbow injury TECHNOLOGIST PROVIDED HISTORY: Reason for exam:->left elbow injury Ordering Physician Provided Reason for Exam: pt fell, skin broken posterior elbow. IV  present Acuity: Acute Type of Exam: Initial Relevant Medical/Surgical History: arthritis FINDINGS: There is slight prominence of the anterior fat pad without a typical sail sign. The posterior fat pad is not visible. There is a questionable cortical defect in the radial head, only seen on one view. Spurring and ossicles are identified along the medial joint margin. There is soft tissue swelling medially. Possible nondisplaced radial head fracture. Recommend conservative therapy with repeat radiographs in 8-10 days. Xr Hand Left (min 3 Views)    Result Date: 5/7/2019  EXAMINATION: 3 XRAY VIEWS OF THE LEFT HAND 5/7/2019 6:24 am COMPARISON: None. HISTORY: ORDERING SYSTEM PROVIDED HISTORY: left hand injury TECHNOLOGIST PROVIDED HISTORY: Reason for exam:->left hand injury Ordering Physician Provided Reason for Exam: pt fell, pain in proximal fingers Acuity: Acute Type of Exam: Initial Mechanism of Injury: pt's 5th digit is fixed in flexion following a recent surgery Relevant Medical/Surgical History: arthritis FINDINGS: There is fixed flexion of the little finger.   No definite fracture is normally situated. The nasal bones and maxillary nasal processes are intact. ORBITS:  Globes appear intact. Extraocular muscles are unremarkable. Comminuted fracture involving the inferior wall of the left globe with mild depression of fracture fragments. Tiny foci of gas within the left maxillary sinus. SINUSES/MASTOIDS:  Mild opacification the right maxillary sinus. Near complete opacification left maxillary sinus. SOFT TISSUES:  Soft tissue swelling along the left frontal skull. Left inferior orbital wall fractures above. Near complete opacification left maxillary sinus with tiny foci of gas. Xr Chest Portable    Result Date: 5/7/2019  EXAMINATION: SINGLE XRAY VIEW OF THE CHEST 5/7/2019 5:59 am COMPARISON: 05/01/2018 HISTORY: ORDERING SYSTEM PROVIDED HISTORY: fall TECHNOLOGIST PROVIDED HISTORY: Reason for exam:->fall Ordering Physician Provided Reason for Exam: low heartrate, fall FINDINGS: Elevated right hemidiaphragm is again seen with some adjacent atelectasis. The lungs are otherwise clear. The heart size is at the upper limits of normal.  There is no discernible pneumothorax. No acute disease. CRITICAL CARE: There was a high probability of clinically significant/life threatening deterioration in this patient's condition which required my urgent intervention. Total critical care time was 30 minutes. This excludes any time for separately reportable procedures. 1. Syncope and collapse    2. Closed head injury, initial encounter          DISPOSITION/PLAN  PATIENT REFERRED TO:  No follow-up provider specified.   DISCHARGE MEDICATIONS:  New Prescriptions    No medications on file         MD Alvarez Caldera MD  05/07/19 5723

## 2019-05-07 NOTE — PROCEDURES
[address]     Electrophysiology Procedure Note       Date of Procedure: 5/7/2019  Patient's Name: Dieudonne Berman  YOB: 1929   Medical Record Number: [id number]  Procedure Performed by: Cindi Garcia MD    Procedures performed:  · Insertion of MRI compatible right ventricular pacing lead under fluoroscopy. · Insertion of MRI compatible right atrial lead under fluoroscopy  · Insertion of a MRI compatible [dual/single] chamber Pacemaker. · Electronic analysis of lead and device. · Anesthesia: Local and Monitored Anesthesia Care  · Level of sedation plan: Moderate sedation (conscious sedation) with intravenous Midazolam 3.5 mg and Fentanyl 50 mcg   · Start time: 1534  · Stop time: 1629  · Mallampati airway assessment class: I  · ASA class: 1  · (there were no independent trained observers who had no other duties involved in this procedure)    Indication of the procedure:       Dieudonne Berman is a 80 y.o. male who is being referred for pacemaker implantation due to non-reversible bradycardia secondary to AV lizett conduction dysfunction with symptoms of syncope. He is now brought to the EP lab for a Medtronic 2-chamber PPM implantation. Of note, he has a history of paroxysmal atrial fibrillation and he presents to the lab in atrial fibrillation with v-rates 20-30 bpm.      Details of procedure: The patient was brought to the electrophysiology laboratory in stable condition. The patient was in a fasting and non-sedated state. The risks, benefits and alternatives of the procedure were discussed with the patient. The risks including, but not limited to, the risks of vascular injury, bleeding, infection, device malfunction, lead dislodgement, radiation exposure, injury to cardiac and surrounding structures (including pneumothorax), stroke, myocardial infarction and death were discussed in detail. The patient opted to proceed with the device implantation.  Written informed consent was signed and placed in the chart. Prophylactic antibiotic using Ancef 1mg IV was given. The patient was prepped and draped in sterile fashion. A timeout protocol was completed to identify the patient and the procedure being performed. IV sedation was provided with IV Versed and IV Fentanyl. The patient was monitored continuously with ECG, pulse oximetry, blood pressure monitoring, and direct observation. An incision was made in the left upper pectoral area in a transverse line roughly 1 cm from the clavicle after administration of lidocaine/bupivicaine combination. Using electrocautery and blunt dissection, a pocket was created. Central venous access into the left axillary vein was obtained using the modified Seldinger technique. After central venous access was obtained, a sheath was placed in the axillary vein. A right ventricular lead was advanced into the mid-septal position under fluoroscopic guidance and using a series of curved and straight stylets. The lead was actively fixated. After confirming appropriate function and no diaphragmatic stimulation at maximum output, the sheath was split and removed. The lead was secured to the underlying tissue using suture material.      A new sheath was advanced over a second previously placed wire into the vein. The atrial lead was advanced to the right atrial appendage and actively fixated under fluoroscopic guidance. After confirming appropriate function and no diaphragmatic stimulation at maximum output, the sheath was split and removed. The lead was secured to the underlying tissue using suture. The leads were then connected to the new pulse generator which was then placed into the pocket. Copious amount (> 200 cc) of saline flush was used to irrigate the pocket. The pocket was then closed using a 2-0 Vicryl subcutaneous layer and a subcuticular layer using 4-0 Vicryl. The skin was covered with Steri-Strips and pressure dressing.  All sponge and needle

## 2019-05-07 NOTE — ED TRIAGE NOTES
States sat on the edge of the bed and got dizzy and fell forward and passed out. States similar situation 1 year ago and found to have a bleeding ulcer and needed 2 units of blood. Has abrasion to forehead, left elbow and left knee. Had nosebleed prior to transport, but it resolved.

## 2019-05-07 NOTE — ED NOTES
This nurse at bedside to help primary nurse get pt triaged. To this nurse, pt's abdomen appears distended. However, abdomen is soft on palpation and non-tender. Pt states that he thinks that's how his abdomen normally looks. Pt also went on to say that he had an episode exactly like this one, about a year ago, where he became lightheaded/dizzy and fell at that time as well. Pt states that they ended up finding out that he had a large bleeding ulcer and had to give him several units of blood because of it. Pt seems confident that this incident that happened this morning is due to a bleeding ulcer. Dr. Suzan Bennett notified of this nurse's concerns. Abdominal CT recommended by this nurse as well.       Mesha Nixon RN  05/07/19 1005

## 2019-05-07 NOTE — PROGRESS NOTES
Clinical Pharmacy Note  Renal Dose Adjustment    Magali Wilkinson is receiving Lovenox. This medication is renally eliminated. Based on the patient's Estimated Creatinine Clearance: 29 mL/min (A) (based on SCr of 1.8 mg/dL (H)). and urine output, the dose has been adjusted to 30 mg daily per protocol. Pharmacy will continue to monitor and adjust dose as needed for changes in renal function.     Viviana Dorsey [facility],5/7/2019,9:44 AM

## 2019-05-07 NOTE — CARE COORDINATION
Discharge Planning:  SW met with pt and pts dtr and son in law to discuss d/c plans. Pt reported that PTA he was living at [facility] in a two bedroom independent living apartment. Pt stated that up until 5 months ago, he had been a resident at [facility] assisted living with his spouse. Pt stated that he was the primary caregiver for his spouse and when she passed away, he felt that he needed to move. Pt stated that was very independent PTA and was able to take care of his own personal care needs, drive and was still doing his own laundry. Pt stated that the facility provided him with two meals per day although he often only went to the dinner meal as he enjoys making his own breakfast and lunch. Pt stated that he anticipates that he will have a pacemaker placed and then he will be able to return to his prior level of functioning, in his apartment at [facility]. Pt and family are aware that [facility] has all levels of care and this would be the preference for pt if he was unable to return to his independent level of care upon d/c. Pt stated that his dtr Jessica Lockhart is his POA and that this SW can speak with Jessica Lockhart re: pts d/c plans. Contact information for this SW left with Jessica Lockhart and SW encouraged family or pt to contact this SW with any concerns. SW will continue to follow to assist with facilitating d/c.  Plan: Undetermined at this time. Pt is from [address] [place]. Pace Maker planned for 5/7. Will need PT/OT to assess when appropriate.   Electronically signed by Leonardo Amado on 5/7/2019 at 1:53 PM

## 2019-05-07 NOTE — ED PROVIDER NOTES
[address]  [address] [facility] [address]  Dept: [phone]  Loc: [address] COMPLAINT    Chief Complaint   Patient presents with    Fall    Loss of Consciousness       HPI    Ervin Marinelli is a 80 y.o. male who presents with a syncopal episode. Onset was shortly prior to arrival. The duration of the syncopal episode was brief. The context was that the patient was lying in bed this morning and felt lightheaded. He states that he sat up in bed and then passed out and fell to the ground. He states that he woke up on the ground and crawled over to the phone and called his daughter who called the squad. He is asymptomatic upon arrival.  Denies any associated symptoms, no aggravating or alleviating factors. REVIEW OF SYSTEMS    Cardiac: no palpitations, no chest pain  Respiratory: no SOB, no new cough  Neurologic: see HPI, no headache, no double vision  GI: no vomiting, no diarrhea  Hematologic: no hematochezia, no hemoptysis  General: no fever, no chills  Musculoskeletal: see HPI  All other systems reviewed and are negative. Haleigh Bran      PAST MEDICAL & SURGICAL HISTORY    Past Medical History:   Diagnosis Date    Arthritis     Hyperlipidemia     Hypertension     Myasthenia gravis     Polycystic kidney      Past Surgical History:   Procedure Laterality Date    BACK SURGERY      CATARACT REMOVAL WITH IMPLANT Right 2/22/13    CATARACT REMOVAL WITH IMPLANT Left 3/22/13    CHOLECYSTECTOMY      COLON SURGERY      FRACTURE SURGERY      HAND SURGERY      JOINT REPLACEMENT      bilateral hip replacement    LITHOTRIPSY      ORTHOPEDIC SURGERY      right elbow & left hand & club feet    OTHER SURGICAL HISTORY      removal mediport    SMALL INTESTINE SURGERY  4/9/13    EXPLORATORY LAPAROTOMY; SMALL BOWEL RESECTION; LYSIS OF ADHESIONS;    TONSILLECTOMY      UPPER GASTROINTESTINAL ENDOSCOPY  9/10/14    with dilatation    UPPER GASTROINTESTINAL ENDOSCOPY  14    EGD with dilitation    UPPER GASTROINTESTINAL ENDOSCOPY  2018    EGD with duodenal ulcer clip       CURRENT MEDICATIONS  (may include discharge medications prescribed in the ED)  Current Outpatient Rx   Medication Sig Dispense Refill    predniSONE (DELTASONE) 20 MG tablet Take 1 tablet by mouth daily as needed (Mysathenia Gravis) 90 tablet 1    metoprolol succinate (TOPROL XL) 25 MG extended release tablet TAKE ONE TABLET BY MOUTH DAILY 30 tablet 5    atorvastatin (LIPITOR) 10 MG tablet TAKE ONE TABLET BY MOUTH DAILY 30 tablet 5    pantoprazole (PROTONIX) 40 MG tablet Take 40 mg by mouth daily      doxazosin (CARDURA) 4 MG tablet Take 4 mg by mouth nightly. ALLERGIES    Allergies   Allergen Reactions    Hydralazine      Upset stomach    Indomethacin      Upset stomach    Mestinon [Pyridostigmine Bromide]     Piroxicam      Upset stomach       SOCIAL & FAMILY HISTORY    Social History     Socioeconomic History    Marital status:       Spouse name: None    Number of children: None    Years of education: None    Highest education level: None   Occupational History    None   Social Needs    Financial resource strain: None    Food insecurity:     Worry: None     Inability: None    Transportation needs:     Medical: None     Non-medical: None   Tobacco Use    Smoking status: Former Smoker     Last attempt to quit: 1950     Years since quittin.0    Smokeless tobacco: Never Used    Tobacco comment: smoked as teenager   Substance and Sexual Activity    Alcohol use: No    Drug use: No    Sexual activity: None   Lifestyle    Physical activity:     Days per week: None     Minutes per session: None    Stress: None   Relationships    Social connections:     Talks on phone: None     Gets together: None     Attends Hoahaoism service: None     Active member of club or organization: None     Attends meetings of clubs or organizations: None     Relationship status: None    Intimate partner violence:     Fear of current or ex partner: None     Emotionally abused: None     Physically abused: None     Forced sexual activity: None   Other Topics Concern    None   Social History Narrative    None     History reviewed. No pertinent family history. PHYSICAL EXAM    VITAL SIGNS: BP (!) 142/82   Pulse (!) 25   Temp 98.2 °F (36.8 °C) (Oral)   Resp 18   Ht 5' 7\" (1.702 m)   Wt 188 lb 11.4 oz (85.6 kg)   SpO2 (!) 86%   BMI 29.56 kg/m²    Constitutional:  Well developed, well nourished, no acute distress  Eyes: Pupils equally round and reactive to light, extraocular movements intact, sclera nonicteric  HENT: +Left periorbital hematoma, moist mucus membranes  Neck: supple, no JVD, no posterior neck tenderness  Respiratory:  Lungs clear to auscultation bilaterally, no retractions   Cardiovascular:  Bradycardic rate, no murmurs  GI:  Soft, nontender, normal bowel sounds  Musculoskeletal:  No edema, no acute deformities  Integument:  Skin warm and dry, no petechiae  Neurologic: Awake, alert, oriented, no aphasia, no slurred speech, CN II-XII intact, normal finger to nose test bilaterally, 5/5 strength in all 4 extremities, sensation to light touch intact bilaterally, patellar reflexes 2+ and equal bilaterally  Vascular: Radial and DP pulses 2+ and equal bilaterally    EKG   Please see the physician's note for EKG interpretation. RADIOLOGY  CT Facial Bones WO Contrast   Final Result   Left inferior orbital wall fractures above. Near complete opacification left   maxillary sinus with tiny foci of gas. CT Cervical Spine WO Contrast   Final Result   1. No acute fracture or traumatic listhesis. 2. Advanced multilevel degenerative changes as described in body of report. CT Head WO Contrast   Final Result   1. No acute intracranial abnormality.    2. Layering dense fluid noted in the left maxillary sinus which is partially   imaged, please refer to the dedicated CT of the maxillofacial bones for   additional information. XR KNEE LEFT (1-2 VIEWS)   Final Result   No fracture or malalignment. XR HAND RIGHT (MIN 3 VIEWS)   Final Result   1. No acute fracture or dislocation. 2. Degenerative changes as described in body of report. XR ELBOW LEFT (MIN 3 VIEWS)   Final Result   Possible nondisplaced radial head fracture. Recommend conservative therapy   with repeat radiographs in 8-10 days. XR HAND LEFT (MIN 3 VIEWS)   Final Result   No fracture or malalignment. XR CHEST PORTABLE   Final Result   No acute disease. ED COURSE & MEDICAL DECISION MAKING    Pertinent Labs & Imaging studies reviewed and interpreted. (See chart for details)  See chart for details of medications given during the ED stay. Vitals:    05/07/19 0647 05/07/19 0648 05/07/19 0649 05/07/19 0732   BP:    (!) 142/82   Pulse: (!) 28 (!) 28 (!) 28 (!) 25   Resp: 12 17 17 18   Temp:       TempSrc:       SpO2: 96% 93% 93% (!) 86%   Weight:       Height:           Differential Diagnosis: Cardiac arrhythmia, drop attack from a subarachnoid hemorrhage, sepsis, dehydration, vasovagal syncope, other    CRITICAL CARE NOTE:  There was a high probability of clinically significant life-threatening deterioration of the patient's condition requiring my urgent intervention. Total critical care time was at least 15 minutes. This includes vital sign monitoring, pulse oximetry monitoring, telemetry monitoring, clinical response to the IV medications, reviewing the nursing notes, consultation time, dictation/documentation time, and interpretation of the labwork. This excludes any separately billable procedures performed. Patient is afebrile and nontoxic in appearance. Labs reveal leukopenia of 1700. Creatinine 1.8, near baseline. CXR findings as above.   CT findings as above.    EKG interpreted by ED physician. Troponin negative. Reevaluation at 8 AM: Patient is resting comfortably. PCP consulted for admission. The patient was also seen and evaluated by the ED attending, Dr. Heri Medellin. Please see the attending note for further details. FINAL IMPRESSION    1. Syncope and collapse    2. Closed head injury, initial encounter    3. Bradycardia    4. Closed nondisplaced fracture of head of left radius, initial encounter    5. Contusion of left hand, initial encounter    6. Contusion of left knee, initial encounter    7. Periorbital hematoma of left eye    8. Closed fracture of orbital wall, initial encounter ([facility] [address]    9.  Leukopenia, unspecified type        PLAN  Admission to the hospital      (Please note that this note was completed with a voice recognition program.  Every attempt was made to edit the dictations, but inevitably there remain words that are mis-transcribed.)        Justin Beltre Alasavannama  05/07/19 0818

## 2019-05-08 ENCOUNTER — TELEPHONE (OUTPATIENT)
Dept: INTERNAL MEDICINE CLINIC | Age: 84
End: 2019-05-08

## 2019-05-08 LAB
BASOPHILS ABSOLUTE: 0 K/UL (ref 0–0.2)
BASOPHILS RELATIVE PERCENT: 0 %
EKG ATRIAL RATE: 36 BPM
EKG DIAGNOSIS: NORMAL
EKG Q-T INTERVAL: 554 MS
EKG QRS DURATION: 162 MS
EKG QTC CALCULATION (BAZETT): 554 MS
EKG R AXIS: -40 DEGREES
EKG T AXIS: 121 DEGREES
EKG VENTRICULAR RATE: 60 BPM
EOSINOPHILS ABSOLUTE: 0 K/UL (ref 0–0.6)
EOSINOPHILS RELATIVE PERCENT: 0.1 %
GLUCOSE BLD-MCNC: 104 MG/DL (ref 70–99)
GLUCOSE BLD-MCNC: 107 MG/DL (ref 70–99)
GLUCOSE BLD-MCNC: 141 MG/DL (ref 70–99)
GLUCOSE BLD-MCNC: 221 MG/DL (ref 70–99)
HCT VFR BLD CALC: 32.3 % (ref 40.5–52.5)
HEMOGLOBIN: 10.9 G/DL (ref 13.5–17.5)
LYMPHOCYTES ABSOLUTE: 0.4 K/UL (ref 1–5.1)
LYMPHOCYTES RELATIVE PERCENT: 6.7 %
MCH RBC QN AUTO: 29.7 PG (ref 26–34)
MCHC RBC AUTO-ENTMCNC: 33.7 G/DL (ref 31–36)
MCV RBC AUTO: 88.1 FL (ref 80–100)
MONOCYTES ABSOLUTE: 0.3 K/UL (ref 0–1.3)
MONOCYTES RELATIVE PERCENT: 4.8 %
NEUTROPHILS ABSOLUTE: 4.8 K/UL (ref 1.7–7.7)
NEUTROPHILS RELATIVE PERCENT: 88.4 %
PDW BLD-RTO: 15.3 % (ref 12.4–15.4)
PERFORMED ON: ABNORMAL
PLATELET # BLD: 122 K/UL (ref 135–450)
PMV BLD AUTO: 9 FL (ref 5–10.5)
RBC # BLD: 3.67 M/UL (ref 4.2–5.9)
URINE CULTURE, ROUTINE: NORMAL
WBC # BLD: 5.4 K/UL (ref 4–11)

## 2019-05-08 PROCEDURE — 6360000002 HC RX W HCPCS: Performed by: INTERNAL MEDICINE

## 2019-05-08 PROCEDURE — 2580000003 HC RX 258: Performed by: NURSE PRACTITIONER

## 2019-05-08 PROCEDURE — 93010 ELECTROCARDIOGRAM REPORT: CPT | Performed by: INTERNAL MEDICINE

## 2019-05-08 PROCEDURE — 85025 COMPLETE CBC W/AUTO DIFF WBC: CPT

## 2019-05-08 PROCEDURE — 6370000000 HC RX 637 (ALT 250 FOR IP): Performed by: INTERNAL MEDICINE

## 2019-05-08 PROCEDURE — 99232 SBSQ HOSP IP/OBS MODERATE 35: CPT | Performed by: NURSE PRACTITIONER

## 2019-05-08 PROCEDURE — 99233 SBSQ HOSP IP/OBS HIGH 50: CPT | Performed by: INTERNAL MEDICINE

## 2019-05-08 PROCEDURE — 99221 1ST HOSP IP/OBS SF/LOW 40: CPT | Performed by: ORTHOPAEDIC SURGERY

## 2019-05-08 PROCEDURE — 2060000000 HC ICU INTERMEDIATE R&B

## 2019-05-08 PROCEDURE — 36415 COLL VENOUS BLD VENIPUNCTURE: CPT

## 2019-05-08 PROCEDURE — 97530 THERAPEUTIC ACTIVITIES: CPT

## 2019-05-08 PROCEDURE — 97162 PT EVAL MOD COMPLEX 30 MIN: CPT

## 2019-05-08 PROCEDURE — 94762 N-INVAS EAR/PLS OXIMTRY CONT: CPT

## 2019-05-08 PROCEDURE — 2580000003 HC RX 258: Performed by: INTERNAL MEDICINE

## 2019-05-08 PROCEDURE — 97166 OT EVAL MOD COMPLEX 45 MIN: CPT

## 2019-05-08 PROCEDURE — 97116 GAIT TRAINING THERAPY: CPT

## 2019-05-08 PROCEDURE — 97535 SELF CARE MNGMENT TRAINING: CPT

## 2019-05-08 RX ADMIN — Medication 10 ML: at 20:59

## 2019-05-08 RX ADMIN — Medication 2 G: at 05:15

## 2019-05-08 RX ADMIN — ENOXAPARIN SODIUM 30 MG: 30 INJECTION SUBCUTANEOUS at 10:00

## 2019-05-08 RX ADMIN — Medication 10 ML: at 10:00

## 2019-05-08 RX ADMIN — PANTOPRAZOLE SODIUM 40 MG: 40 TABLET, DELAYED RELEASE ORAL at 10:00

## 2019-05-08 RX ADMIN — METOPROLOL SUCCINATE 25 MG: 25 TABLET, EXTENDED RELEASE ORAL at 10:00

## 2019-05-08 RX ADMIN — DOXAZOSIN 4 MG: 4 TABLET ORAL at 20:58

## 2019-05-08 ASSESSMENT — ENCOUNTER SYMPTOMS
RESPIRATORY NEGATIVE: 1
GASTROINTESTINAL NEGATIVE: 1

## 2019-05-08 ASSESSMENT — PAIN DESCRIPTION - PAIN TYPE: TYPE: ACUTE PAIN

## 2019-05-08 ASSESSMENT — PAIN DESCRIPTION - DESCRIPTORS: DESCRIPTORS: OTHER (COMMENT)

## 2019-05-08 ASSESSMENT — PAIN SCALES - GENERAL
PAINLEVEL_OUTOF10: 0
PAINLEVEL_OUTOF10: 2

## 2019-05-08 ASSESSMENT — PAIN DESCRIPTION - PROGRESSION
CLINICAL_PROGRESSION: NOT CHANGED

## 2019-05-08 ASSESSMENT — PAIN - FUNCTIONAL ASSESSMENT: PAIN_FUNCTIONAL_ASSESSMENT: ACTIVITIES ARE NOT PREVENTED

## 2019-05-08 ASSESSMENT — PAIN DESCRIPTION - LOCATION: LOCATION: HAND

## 2019-05-08 ASSESSMENT — PAIN DESCRIPTION - FREQUENCY: FREQUENCY: CONTINUOUS

## 2019-05-08 ASSESSMENT — PAIN DESCRIPTION - ONSET: ONSET: ON-GOING

## 2019-05-08 ASSESSMENT — PAIN DESCRIPTION - ORIENTATION: ORIENTATION: RIGHT;LEFT

## 2019-05-08 NOTE — CARE COORDINATION
Discharge Planning:  SS consult received to plan for skilled care for this pt at [facility]. SW contacted pts dtr, Xiao Perez. Xiao Perez stated that she is agreeable to exploring this if appropriate for this pt. SW spoke with pts RN and requested a PT/OT eval.  Plan: PT/OT requested to determine if pt will need SNF at [facility]. Pt is from [facility] independent living. Will need HENS if SNF appropriate.   Electronically signed by Chava Godinez on 5/8/2019 at 8:59 AM

## 2019-05-08 NOTE — CONSULTS
Orthopaedic Surgery Consult Note      Reason for consult:  Left radial head fracture    Requesting physician: Marquis Fu MD    CHIEF COMPLAINT: None    HISTORY:  Mr. Yusef Gordillo is a 80 y.o. male, who presented to [facility] ER yesterday after syncopal episode and fall. He was found to be in Afib and bradycardia. He was subsequently admitted to the hospital.  He had an abrasion on the left elbow and xrays of the left elbow were obtained, read by radiologist as possible nondisplaced radial head fracture. Orthopaedic consult was requested. He currently denies any pain in his left arm. States he did not have pain yesterday either. No numbness or tingling in left hand.       Past Medical History:   Diagnosis Date    Arthritis     Hyperlipidemia     Hypertension     Myasthenia gravis     Polycystic kidney        Past Surgical History:   Procedure Laterality Date    BACK SURGERY      CATARACT REMOVAL WITH IMPLANT Right 2/22/13    CATARACT REMOVAL WITH IMPLANT Left 3/22/13    CHOLECYSTECTOMY      COLON SURGERY      FRACTURE SURGERY      HAND SURGERY      JOINT REPLACEMENT      bilateral hip replacement    LITHOTRIPSY      ORTHOPEDIC SURGERY      right elbow & left hand & club feet    OTHER SURGICAL HISTORY      removal mediport    SMALL INTESTINE SURGERY  4/9/13    EXPLORATORY LAPAROTOMY; SMALL BOWEL RESECTION; LYSIS OF ADHESIONS;    TONSILLECTOMY      UPPER GASTROINTESTINAL ENDOSCOPY  9/10/14    with dilatation    UPPER GASTROINTESTINAL ENDOSCOPY  9-17-14    EGD with dilitation    UPPER GASTROINTESTINAL ENDOSCOPY  04/18/2018    EGD with duodenal ulcer clip       Current Facility-Administered Medications   Medication Dose Route Frequency Provider Last Rate Last Dose    atorvastatin (LIPITOR) tablet 10 mg  10 mg Oral Daily Carlito Encinas MD        pantoprazole (PROTONIX) tablet 40 mg  40 mg Oral Daily Carlito Encinas MD   40 mg Highest education level: Not on file   Occupational History    Not on file   Social Needs    Financial resource strain: Not on file    Food insecurity:     Worry: Not on file     Inability: Not on file    Transportation needs:     Medical: Not on file     Non-medical: Not on file   Tobacco Use    Smoking status: Former Smoker     Last attempt to quit: 1950     Years since quittin.1    Smokeless tobacco: Never Used    Tobacco comment: smoked as teenager   Substance and Sexual Activity    Alcohol use: No    Drug use: No    Sexual activity: Not on file   Lifestyle    Physical activity:     Days per week: Not on file     Minutes per session: Not on file    Stress: Not on file   Relationships    Social connections:     Talks on phone: Not on file     Gets together: Not on file     Attends Moravian service: Not on file     Active member of club or organization: Not on file     Attends meetings of clubs or organizations: Not on file     Relationship status: Not on file    Intimate partner violence:     Fear of current or ex partner: Not on file     Emotionally abused: Not on file     Physically abused: Not on file     Forced sexual activity: Not on file   Other Topics Concern    Not on file   Social History Narrative    Not on file       History reviewed. No pertinent family history. Review of Systems:   General: negative  Psychological: negative  Ophthalmic: negative  ENT: orbital fracture  Hematological and Lymphatic: negative  Endocrine: negative  Respiratory: negative  Cardiovascular: syncope  Gastrointestinal: negative  Genito-Urinary: negative  Musculoskeletal: negative. See HPI for pertinent positives. Neurological: negative  Dermatological: negative       PHYSICAL EXAM:  Mr. Heri Castillo is a 80 y.o. male who presents today in no acute distress, awake, alert, and oriented.     BP (!) 145/79   Pulse 60   Temp 98 °F (36.7 °C) (Oral)   Resp 19   Ht 5' 7\" (1.702 m)   Wt 181 lb (82.1 kg) SpO2 95%   BMI 28.35 kg/m²     On evaluation of his left upper extremity, there is no obvious deformity. There is no swelling and is no ecchymosis. He is nontender to palpation over the left elbow, particularly the radial, and otherwise nontender over the remainder of the extremity. Range of motion of the left elbow is full in flexion, extension, pronation, supination, and symmetrict to the right elbow. Distal pulses are 2+ and symmetric bilaterally. Sensation is grossly intact to light touch and symmetric bilaterally. EPL / FPL / Interossei intact billaterally. Left elbow Xrays: I independently reviewed the images, as well as the radiology report. Questionable cortical defect in radial head seen on only one view. No posterior fat pad sign. Left hand xrays: No fracture or dislocation. Left knee xrays: no fracture or dislocation. Right hand xrays: 1st [facility] joint arthritis        CBC:   Lab Results   Component Value Date    WBC 5.4 05/08/2019    RBC 3.67 05/08/2019    HGB 10.9 05/08/2019    HCT 32.3 05/08/2019    MCV 88.1 05/08/2019    MCH 29.7 05/08/2019    MCHC 33.7 05/08/2019    RDW 15.3 05/08/2019     05/08/2019    MPV 9.0 05/08/2019     PT/INR:    Lab Results   Component Value Date    PROTIME 10.3 09/10/2014    INR 0.96 09/10/2014     Chem Basic:   Lab Results   Component Value Date     05/07/2019    K 4.5 05/07/2019     05/07/2019    CO2 20 05/07/2019    GLUCOSE 202 05/07/2019    BUN 26 05/07/2019    CREATININE 1.8 05/07/2019          IMPRESSION:    Cardiogenic syncope   Afib  Left orbital fracture  Myasthenia gravis  Chronic kidney disease       PLAN:  I do not think the patient has a fracture of his radial head. He denies pain. He has full pain-free ROM. He is nontender to palpation throughout his elbow. He can be WBAT left arm. PT / OT already seeing him while I was in the room. Ice prn if any pain. Follow up prn.     Thank you for the opportunity to

## 2019-05-08 NOTE — TELEPHONE ENCOUNTER
Pt was admitted to  [facility] [place] yesterday. He had a pacemaker put in. The  said he will need skilled care upon discharge. Dr. Raymond Rodríguez will need to keep him in the hospital for 3 days in order for Medicare to pay for skilled care.

## 2019-05-08 NOTE — PROGRESS NOTES
IM Progress Note          CC:  Syncope      Interval history:  Javier Hill is doing better post pacemaker. No syncope, chest pain or sob. He does have a fracture of the inferior orbit of the left eye and does have some diplopia on lateral vision. This is though not new to him as he has Myasthenia Gravis and has these ocular symptoms. Review of Systems:  Review of Systems   Constitutional: Negative. Respiratory: Negative. Gastrointestinal: Negative. Musculoskeletal:        Hand pain and left elbow pain. Neurological: Negative for syncope. 14 point ros otherwise negative. Objective:    BP (!) 145/79   Pulse 60   Temp 98 °F (36.7 °C) (Oral)   Resp 19   Ht 5' 7\" (1.702 m)   Wt 181 lb (82.1 kg)   SpO2 95%   BMI 28.35 kg/m²     Intake/Output Summary (Last 24 hours) at 5/8/2019 0813  Last data filed at 5/8/2019 0600  Gross per 24 hour   Intake 644 ml   Output 525 ml   Net 119 ml        Physical Exam   Constitutional: He appears well-developed and well-nourished. No distress. HENT:   Left infraorbital ecchymosis. Eyes:   He has left lateral gaze palsy. Unable to abduct left eye. Neck: No JVD present. Cardiovascular: Normal rate and regular rhythm. Pulmonary/Chest: Effort normal and breath sounds normal. No respiratory distress. He has no rales. Abdominal: Soft. He exhibits no distension. There is no tenderness. Musculoskeletal:   Left elbow tenderness. Skin: Skin is warm and dry. CBC:   Recent Labs     05/07/19  1047 05/08/19  0501   WBC 5.1 5.4   HGB 11.4* 10.9*   * 122*     BMP:    Recent Labs     05/07/19  0606      K 4.5      CO2 20*   BUN 26*   CREATININE 1.8*   GLUCOSE 202*     Hepatic:   Recent Labs     05/07/19 0606   AST 11*   ALT 8*   BILITOT 0.7   ALKPHOS 95     Troponin:   Recent Labs     05/07/19 0606   TROPONINI <0.01     BNP: No results for input(s): BNP in the last 72 hours.   Lipids: No results for input(s): CHOL, HDL in the last 72 hours. Invalid input(s): LDLCALCU  INR: No results for input(s): INR in the last 72 hours. Glucose:    Recent Labs     05/07/19  0606   GLUCOSE 202*              Assessment/Plan:    Active Hospital Problems    Diagnosis Date Noted    Syncope, cardiogenic [R55] - resolved 05/07/2019     Priority: High    Atrial fibrillation with slow ventricular response ([facility] [address] [I48.91] - improved post PM. 05/07/2019     Priority: High    Closed fracture of orbital wall ([facility] [address] [S02.80XA] 05/07/2019     Priority: Medium - will need to see ENT post discharge.  Neutropenia ([facility] [address] [D70.9] - resolved; suspect lab error. 05/07/2019     Priority: Medium    Stage 3 chronic kidney disease ([facility] [address] [N18.3] 05/07/2019     Priority: Low - stable.  Hyperglycemia - continue SSI. 05/07/2019     Priority: Low    History of GI bleed [Z87.19]      Priority: Low    Myasthenia gravis ([facility] [address] [G70.00] - suspect lateral rectus palsy is 2/2 Myasthenia rather than entrapment. Priority: Low    Hyperlipidemia [E78.5]     Hypertension [I10]     Polycystic kidney [Q61.3]        Will ask ortho to see for his left elbow. Will need to see ENT and opthalmology on discharge. Will need SNU on discharge.     Electronically signed by Jay Chavis MD on 5/8/2019 at 8:13 AM

## 2019-05-08 NOTE — PROGRESS NOTES
Physical Therapy    Facility/Department: [facility] 6B ICU  Initial Assessment    NAME: Vita Thompson  : 12/15/1929  MRN: [id number]    Date of Service: 2019    Discharge Recommendations:  Continue to assess pending progress   PT Equipment Recommendations  Other: Will monitor for potential equipt needs. Vita Thompson scored a 16/24 on the AM-PAC short mobility form. Current research shows that an AM-PAC score of 17 or less is typically not associated with a discharge to the patient's home setting. Based on the patients AM-PAC score and their current functional mobility deficits, it is recommended that the patient have 5-7 sessions per week of Physical Therapy at d/c to increase the patients independence. Assessment   Body structures, Functions, Activity limitations: Decreased functional mobility ; Decreased safe awareness;Decreased endurance;Decreased balance;Decreased vision/visual deficit  Assessment: 81 y/o male admit 19 with Syncope/Collapse, Bradycardia, CHF and CHI (Fall at Home). CT Head/C-Spine negative. CT Facial Bones - L Inferior Orbital Wall Fx. X-Ray L Elbow - Possible Nondisplaced Radial Head Fx (per Ortho 19 - \"I do not think the pt has Radial Head Fx.\"). PMH as noted including Myasthenia Gravis, Exp Lap,  Back Surg, B THR, R Elbow/L Hand and B Feet (Club Foot) Surgs, Arthritis. PTA pt resides independent living (moved there following death of spouse approx 5 mos ago). Fall prior to admit due to Bradycardia with several injuries as noted; s/p Pacemaker 19 with L UE post-op limit affecting care. Visual deficits (diplopia) due to Myasthenia Gravis also affecting recovery. Recommend In-Pt Rehab consult at this time. Pt/family appear receptive at this time. History: 81 y/o male admit 19 with Syncope/Collapse, Bradycardia, CHF and CHI (Fall at Home). CT Head/C-Spine negative. CT Facial Bones - L Inferior Orbital Wall Fx.   X-Ray L Elbow - Possible Nondisplaced Radial Head Fx (per Ortho 5/8/19 - \"I do not think the pt has Radial Head Fx.\"). PMH as noted including Myasthenia Gravis, Exp Lap,  Back Surg, B THR, R Elbow/L Hand and B Feet (Club Foot) Surgs, Arthritis. Exam: See above. Clinical Presentation: See above. Patient Education: Role of PT, POC, Need to call for assist, L UE Restrictions following recent Pacemaker. Barriers to Learning: None. REQUIRES PT FOLLOW UP: Yes  Activity Tolerance  Activity Tolerance: Patient limited by endurance       Patient Diagnosis(es): The primary encounter diagnosis was Syncope and collapse. Diagnoses of Closed head injury, initial encounter, Bradycardia, Closed nondisplaced fracture of head of left radius, initial encounter, Contusion of left hand, initial encounter, Contusion of left knee, initial encounter, Periorbital hematoma of left eye, Closed fracture of orbital wall, initial encounter ([facility] [address] and Leukopenia, unspecified type were also pertinent to this visit. has a past medical history of Arthritis, Hyperlipidemia, Hypertension, Myasthenia gravis, and Polycystic kidney. has a past surgical history that includes joint replacement; back surgery; Tonsillectomy; Lithotripsy; orthopedic surgery; Cholecystectomy; other surgical history; Cataract removal with implant (Right, 2/22/13); Cataract removal with implant (Left, 3/22/13); Hand surgery; Small intestine surgery (4/9/13); Colon surgery; fracture surgery; Upper gastrointestinal endoscopy (9/10/14); Upper gastrointestinal endoscopy (9-17-14); and Upper gastrointestinal endoscopy (04/18/2018).     Restrictions  Restrictions/Precautions  Restrictions/Precautions: Weight Bearing  Implants present? : Pacemaker  Upper Extremity Weight Bearing Restrictions  Left Upper Extremity Weight Bearing: Weight Bearing As Tolerated  Other: Ortho (Dr. Niles Batres at bedside today 5/8/19) -consulted for possible L Radial Head Fx :  \"I do not think the patient has a Radial Head Fx\", no wgt bear or activity restrictions. Position Activity Restriction  Other position/activity restrictions: New Pacemaker placed 5/7/19 - L UE ROM Restrictions. Vision/Hearing  Vision: Impaired(Diplopia due to Myasthenia Gravis.  )  Hearing: Within functional limits     Subjective  General  Chart Reviewed: Yes  Patient assessed for rehabilitation services?: Yes  Additional Pertinent Hx: 79 y/o male admit 5/7/19 with Syncope/Collapse, Bradycardia, CHF and CHI (Fall at Home). CT Head/C-Spine negative. CT Facial Bones - L Inferior Orbital Wall Fx. X-Ray L Elbow - Possible Nondisplaced Radial Head Fx (per Ortho 5/8/19 - \"I do not think the pt has Radial Head Fx.\"). PMH as noted including Myasthenia Gravis, Exp Lap,  Back Surg, B THR, R Elbow/L Hand and B Feet (Club Foot) Surgs, Arthritis. Family / Caregiver Present: Yes(Dtr. )  Referring Practitioner: Dr. Myriam Fonseca  Diagnosis: Syncope/Collapse, Bradycardia, CHF, and CHI (fall at home) S/P Pacemaker 5/7/19. Follows Commands: Within Functional Limits  Subjective  Subjective: Pt agreeable to PT Eval/Rx. Pt is denying any pain at this time. Pain Screening  Patient Currently in Pain: Denies          Orientation  Orientation  Overall Orientation Status: Within Functional Limits  Social/Functional History  Social/Functional History  Lives With: Alone  Type of Home: Facility([facility]). )  Home Layout: One level  Home Access: Level entry  Bathroom Shower/Tub: Tub/Shower unit(Cut-out Tub.)  Bathroom Toilet: Handicap height  Bathroom Equipment: Grab bars in shower, Shower chair, Grab bars around toilet  Bathroom Accessibility: Accessible  Home Equipment: Rolling walker, [employer] Help From: Family(pt reports staff at [employer] for him)  ADL Assistance: Independent  Ambulation Assistance: Independent(With use of Walker (prn).  )  Transfer Assistance: Independent  Active : Yes  Mode of Transportation: Car  Occupation: Devices  Type of devices: Call light within reach, Chair alarm in place, Left in chair, Nurse notified(Pt aware to call for assist.  PT spoke with pt's nurse Marlin Iglesias). )    G-Code       OutComes Score                                                  AM-PAC Score  AM-PAC Inpatient Mobility Raw Score : 16  AM-PAC Inpatient T-Scale Score : 40.78  Mobility Inpatient CMS 0-100% Score: 54.16  Mobility Inpatient CMS G-Code Modifier : CK          Goals  Short term goals  Time Frame for Short term goals: Upon d/c acute care setting. Short term goal 1: Bed Mob Independent. Short term goal 2: Transfers with assist device Independent. Short term goal 3: Amb with assist device 250' Independent. Patient Goals   Patient goals : Return to Independent living.          Therapy Time   Individual Concurrent Group Co-treatment   Time In 6242         Time Out 1115         Minutes [address] [place] [address]

## 2019-05-08 NOTE — PROGRESS NOTES
Electrophysiology - PROGRESS NOTE    Admit Date: 5/7/2019     Chief Complaint: syncope     Interval History:   Patient seen and examined and notes reviewed. Patient is being followed for syncope and collapse d/t bradycardia secondary to AV lizett dysfuntion. He is s/p MDT dual chamber PPM with Dr. Terry Bustos 5/7/2019, currently v pacing on tele with afib underlying rhythm. He is not on [address] at this time. He is up in chair today, has no complaints, dressing is CDI, no hematoma noted. CXR shows proper lead placement.      In: 644 [P.O.:385; I.V.:59]  Out: 400    Wt Readings from Last 2 Encounters:   05/08/19 181 lb (82.1 kg)   01/28/19 186 lb (84.4 kg)         Data:   Scheduled Meds:   Scheduled Meds:   atorvastatin  10 mg Oral Daily    pantoprazole  40 mg Oral Daily    insulin lispro  0-6 Units Subcutaneous TID WC    insulin lispro  0-3 Units Subcutaneous Nightly    enoxaparin  30 mg Subcutaneous Daily    sodium chloride flush  10 mL Intravenous 2 times per day    metoprolol succinate  25 mg Oral Daily    doxazosin  4 mg Oral Nightly     Continuous Infusions:   dextrose       PRN Meds:.ondansetron, glucose, dextrose, glucagon (rDNA), dextrose, sodium chloride flush, acetaminophen, magnesium hydroxide, HYDROcodone 5 mg - acetaminophen  Continuous Infusions:   dextrose         Intake/Output Summary (Last 24 hours) at 5/8/2019 1049  Last data filed at 5/8/2019 0600  Gross per 24 hour   Intake 644 ml   Output 525 ml   Net 119 ml       CBC:   Lab Results   Component Value Date    WBC 5.4 05/08/2019    HGB 10.9 05/08/2019     05/08/2019     BMP:  Lab Results   Component Value Date     05/07/2019    K 4.5 05/07/2019     05/07/2019    CO2 20 05/07/2019    BUN 26 05/07/2019    CREATININE 1.8 05/07/2019    GLUCOSE 202 05/07/2019     INR:   Lab Results   Component Value Date    INR 0.96 09/10/2014    INR 1.01 04/20/2013    INR 0.85 04/07/2013        CARDIAC LABS  ENZYMES:  Recent Labs     05/07/19  0606 TROPONINI <0.01     FASTING LIPID PANEL:  Lab Results   Component Value Date    HDL 41 08/05/2013    HDL 60 06/04/2012    LDLDIRECT 146 01/05/2011    LDLCALC 55 08/05/2013    TRIG 104 08/05/2013    TSH 2.99 04/20/2013     LIVER PROFILE:  Lab Results   Component Value Date    AST 11 05/07/2019    AST 7 04/29/2018    ALT 8 05/07/2019    ALT <5 04/29/2018       -----------------------------------------------------------------  Telemetry: Personally reviewed  Vpacing/AF underlying at 62bpm    Objective:   Vitals: BP (!) 145/79   Pulse 60   Temp 98 °F (36.7 °C) (Oral)   Resp 19   Ht 5' 7\" (1.702 m)   Wt 181 lb (82.1 kg)   SpO2 95%   BMI 28.35 kg/m²   General appearance: alert, appears stated age and cooperative, No acute distress   Skin: Skin color, texture, turgor normal. Ecchymosis Left orbital and left elbow/knee. Neck: no JVD, supple, symmetrical, trachea midline   Lungs: clear, no accessory muscle use, no respiratory distress  Heart: Irreg rhythm; normal rate; No murmurs/gallops  Abdomen: soft, non-tender; bowel sounds normal  Extremities: No edema, DP +1s  Psychiatric: normal insight and affect      Assessment & Plan:      Cardiac Hx: Konrad Banuelos is a 80 y.o. with PMH of myasthenia gravis, polycystic kidney disease, GI bleed, HLD, HTN, and pAF (first documented in 2014 post abdominal surgery, started amio and coumadin, then discontinued and remained in NSR) p/w syncopal episode while getting out of bed this AM and found to be in atrial fibrillation with slow ventricular response with a rate of 33 bpm on ECG. On admission, CT scan shows facial fracture of left orbital wall and XR with possible left radius fracture, BNP 3100, negative troponin, Cr 1.8 (baseline), WBC 1.7, and lactic acid 2.4.    [id number] 3 TSH 2.99 (4/20/2019)     AF w/ slow ventricular response  -s/p MDT dual chamber PPM 5/7/2019  -Vpacing with AF underlying  -CXR shows proper lead placement  -dressing is CDI  -Start Eliquis 2.5mg

## 2019-05-08 NOTE — PROGRESS NOTES
4 Eyes Skin Assessment     The patient is being assess for  Shift Handoff    I agree that 2 RN's have performed a thorough Head to Toe Skin Assessment on the patient. ALL assessment sites listed below have been assessed. Areas assessed by both nurses: Lynn/Yakelin  [x]   Head, Face, and Ears   [x]   Shoulders, Back, and Chest  [x]   Arms, Elbows, and Hands   [x]   Coccyx, Sacrum, and IschIum  [x]   Legs, Feet, and Heels        Does the Patient have Skin Breakdown?   No         Derek Prevention initiated:  Yes   Wound Care Orders initiated:  No      [facility] nurse consulted for Pressure Injury (Stage 3,4, Unstageable, DTI, NWPT, and Complex wounds), New and Established Ostomies:  No      Nurse 1 eSignature: Electronically signed by Milton Holcomb RN on 5/8/19 at 7:45 AM    **SHARE this note so that the co-signing nurse is able to place an eSignature**    Nurse 2 eSignature: {Esignature:[id number]}

## 2019-05-08 NOTE — PLAN OF CARE
Problem: Pain:  Goal: Pain level will decrease  Description  Pain level will decrease  Outcome: Met This Shift  Note:   Patient had pain in his hands from the fall. He reports pain medication helped  Continuing to monitor pain and discomfort. Monitoring pain level on scale of 0-10. Non- pharmacological measures encouraged to reduce discomfort/pain. PRN pain meds administeration continues when/if applicable as ordered by physician. Problem: Falls - Risk of:  Goal: Will remain free from falls  Description  Will remain free from falls  Outcome: Met This Shift  Note:   Falling star program remains in place. Call light and personal belongings within reach. Frequent visual monitoring continues. Toileting program inplace. Patient assisted in turning/repositioning at least once every 2 hours, and on a prn basis. Goal: Absence of physical injury  Description  Absence of physical injury  Outcome: Met This Shift     Problem: Risk for Impaired Skin Integrity  Goal: Tissue integrity - skin and mucous membranes  Description  Structural intactness and normal physiological function of skin and  mucous membranes. Outcome: Met This Shift  Note:   Monitoring patient skin integrity for skin breakdown, turning and repositioning q2h per protocol. Patient demonstrates turning and repositioning self.

## 2019-05-08 NOTE — PROGRESS NOTES
precautions in place;Nurse notified;Gait belt;Call light within reach; Chair alarm in place; Left in chair(GLENYS Bailey notified)         Patient Diagnosis(es): The primary encounter diagnosis was Syncope and collapse. Diagnoses of Closed head injury, initial encounter, Bradycardia, Closed nondisplaced fracture of head of left radius, initial encounter, Contusion of left hand, initial encounter, Contusion of left knee, initial encounter, Periorbital hematoma of left eye, Closed fracture of orbital wall, initial encounter ([facility] [address] and Leukopenia, unspecified type were also pertinent to this visit. has a past medical history of Arthritis, Hyperlipidemia, Hypertension, Myasthenia gravis, and Polycystic kidney. has a past surgical history that includes joint replacement; back surgery; Tonsillectomy; Lithotripsy; orthopedic surgery; Cholecystectomy; other surgical history; Cataract removal with implant (Right, 2/22/13); Cataract removal with implant (Left, 3/22/13); Hand surgery; Small intestine surgery (4/9/13); Colon surgery; fracture surgery; Upper gastrointestinal endoscopy (9/10/14); Upper gastrointestinal endoscopy (9-17-14); and Upper gastrointestinal endoscopy (04/18/2018). Restrictions  Restrictions/Precautions  Restrictions/Precautions: Weight Bearing  Implants present? : Pacemaker  Upper Extremity Weight Bearing Restrictions  Left Upper Extremity Weight Bearing: Weight Bearing As Tolerated  Other: Ortho Dr. Sera Esparza at bedside, advised no restrictions and AAT   Subjective   General  Chart Reviewed: Yes  Patient assessed for rehabilitation services?: Yes  Additional Pertinent Hx: Pt is an 81 yo male admitted to [facility] after a syncopal episode at home. The pt was sitting on the side of the bed and \"fell to the floor, striking his head and face\" He was unresponsive for a period of time but came to and was able to call his daughter.  The pt's medical history is also significant for atrial fibrilation, previous GI bleeding, and myasthenia gravis. Chart indicates possibility of a L radial head fx however Dr. Patti Allan cleared the pt for use of L UE WBAT. Pt had pacemaker placed 5/7  Family / Caregiver Present: Yes(daughter)  Referring Practitioner: Osiris  Diagnosis: cardiogenic syncope  Subjective  Subjective: Pt was met b/s for OT eval/tx with PT.  Pt was agreeable to therapy, reports pain in L side of face and L UE from fall      Orientation  Orientation  Overall Orientation Status: Within Normal Limits  Objective    ADL  UE Dressing: (to doff/don socks sitting in chair)  LE Dressing: Stand by assistance(to doff/don socks sitting in chair)  Additional Comments: Anticipate pt to require overall Min A for ADLs based on strength, ROM, and L side pacemaker precautions        Balance  Sitting Balance: Supervision  Standing Balance: Contact guard assistance(with RW)  Functional Mobility  Functional - Mobility Device: Rolling Walker  Assist Level: Contact guard assistance  Functional Mobility Comments: close CGA for fxl transfer from bed > chair with RW  Bed mobility  Supine to Sit: Minimal assistance  Scooting: Minimal assistance  Transfers  Stand Step Transfers: Contact guard assistance  Sit to stand: Contact guard assistance  Transfer Comments: close CGA to take steps from bed > chair with RW                       Cognition  Overall Cognitive Status: WNL                       LUE AROM (degrees)  LUE AROM : Exceptions  LUE General AROM: limited to 90 degrees by pacemaker precautions  RUE AROM (degrees)  RUE AROM : WNL                 Plan   Plan  Times per week: 3-5  Times per day: Daily  Current Treatment Recommendations: Strengthening, Functional Mobility Training, Patient/Caregiver Education & Training, ROM, Endurance Training, Balance Training, Self-Care / ADL                                                  AM-PAC Score        AM-PAC Inpatient Daily Activity Raw Score: 16  AM-PAC Inpatient ADL T-Scale Score : 35.96  ADL Inpatient CMS 0-100% Score: 53.32  ADL Inpatient CMS G-Code Modifier : CK    Goals  Short term goals  Time Frame for Short term goals: prior to d/c  Short term goal 1: Pt will complete fxl transfers with supervision and AD  Short term goal 2: Pt will complete LB dressing with supervision  Short term goal 3: Pt will toilet with supervision  Short term goal 4: Pt will perform UB dressing with Min a  Short term goal 5: Pt will adhere to pacemaker precautions during fxl tasks independently  Long term goals  Time Frame for Long term goals : LTG = STG  Patient Goals   Patient goals : to return to independent living       Therapy Time   Individual Concurrent Group Co-treatment   Time In 1015         Time Out 1115         Minutes 60         Timed Code Treatment Minutes: 45 Minutes     This note to serve as OT d/c summary if pt is d/c-ed prior to next therapy session.     Bina Vázquez, OTR/L [id number]

## 2019-05-09 ENCOUNTER — HOSPITAL ENCOUNTER (INPATIENT)
Age: 84
LOS: 6 days | Discharge: HOME | DRG: 949 | End: 2019-05-15
Attending: PHYSICAL MEDICINE & REHABILITATION | Admitting: PHYSICAL MEDICINE & REHABILITATION
Payer: MEDICARE

## 2019-05-09 VITALS
BODY MASS INDEX: 28.41 KG/M2 | WEIGHT: 181 LBS | SYSTOLIC BLOOD PRESSURE: 145 MMHG | TEMPERATURE: 98 F | OXYGEN SATURATION: 93 % | RESPIRATION RATE: 16 BRPM | HEIGHT: 67 IN | HEART RATE: 80 BPM | DIASTOLIC BLOOD PRESSURE: 84 MMHG

## 2019-05-09 PROBLEM — Z95.0 CARDIAC PACEMAKER RECIPIENT: Status: ACTIVE | Noted: 2019-05-09

## 2019-05-09 LAB
BASOPHILS ABSOLUTE: 0 K/UL (ref 0–0.2)
BASOPHILS RELATIVE PERCENT: 0.4 %
EOSINOPHILS ABSOLUTE: 0.1 K/UL (ref 0–0.6)
EOSINOPHILS RELATIVE PERCENT: 1.8 %
GLUCOSE BLD-MCNC: 105 MG/DL (ref 70–99)
GLUCOSE BLD-MCNC: 162 MG/DL (ref 70–99)
GLUCOSE BLD-MCNC: 189 MG/DL (ref 70–99)
GLUCOSE BLD-MCNC: 228 MG/DL (ref 70–99)
GLUCOSE BLD-MCNC: 93 MG/DL (ref 70–99)
HCT VFR BLD CALC: 31.9 % (ref 40.5–52.5)
HEMOGLOBIN: 10.8 G/DL (ref 13.5–17.5)
LYMPHOCYTES ABSOLUTE: 1 K/UL (ref 1–5.1)
LYMPHOCYTES RELATIVE PERCENT: 25.4 %
MCH RBC QN AUTO: 29.8 PG (ref 26–34)
MCHC RBC AUTO-ENTMCNC: 34 G/DL (ref 31–36)
MCV RBC AUTO: 87.9 FL (ref 80–100)
MONOCYTES ABSOLUTE: 0.4 K/UL (ref 0–1.3)
MONOCYTES RELATIVE PERCENT: 10.2 %
NEUTROPHILS ABSOLUTE: 2.6 K/UL (ref 1.7–7.7)
NEUTROPHILS RELATIVE PERCENT: 62.2 %
PDW BLD-RTO: 15.3 % (ref 12.4–15.4)
PERFORMED ON: ABNORMAL
PERFORMED ON: NORMAL
PLATELET # BLD: 113 K/UL (ref 135–450)
PMV BLD AUTO: 8.7 FL (ref 5–10.5)
RBC # BLD: 3.63 M/UL (ref 4.2–5.9)
WBC # BLD: 4.1 K/UL (ref 4–11)

## 2019-05-09 PROCEDURE — 99239 HOSP IP/OBS DSCHRG MGMT >30: CPT | Performed by: INTERNAL MEDICINE

## 2019-05-09 PROCEDURE — 2580000003 HC RX 258: Performed by: NURSE PRACTITIONER

## 2019-05-09 PROCEDURE — 1280000000 HC REHAB R&B

## 2019-05-09 PROCEDURE — 6360000002 HC RX W HCPCS: Performed by: INTERNAL MEDICINE

## 2019-05-09 PROCEDURE — 85025 COMPLETE CBC W/AUTO DIFF WBC: CPT

## 2019-05-09 PROCEDURE — 6370000000 HC RX 637 (ALT 250 FOR IP): Performed by: INTERNAL MEDICINE

## 2019-05-09 PROCEDURE — 97530 THERAPEUTIC ACTIVITIES: CPT

## 2019-05-09 PROCEDURE — 6370000000 HC RX 637 (ALT 250 FOR IP): Performed by: PHYSICAL MEDICINE & REHABILITATION

## 2019-05-09 PROCEDURE — 36415 COLL VENOUS BLD VENIPUNCTURE: CPT

## 2019-05-09 PROCEDURE — 94762 N-INVAS EAR/PLS OXIMTRY CONT: CPT

## 2019-05-09 PROCEDURE — 97535 SELF CARE MNGMENT TRAINING: CPT

## 2019-05-09 RX ORDER — HYDROCODONE BITARTRATE AND ACETAMINOPHEN 5; 325 MG/1; MG/1
1 TABLET ORAL EVERY 6 HOURS PRN
Qty: 30 TABLET | Refills: 0 | Status: ON HOLD
Start: 2019-05-09 | End: 2019-05-15 | Stop reason: HOSPADM

## 2019-05-09 RX ORDER — HYDROCODONE BITARTRATE AND ACETAMINOPHEN 5; 325 MG/1; MG/1
1 TABLET ORAL EVERY 6 HOURS PRN
Status: DISCONTINUED | OUTPATIENT
Start: 2019-05-09 | End: 2019-05-15 | Stop reason: HOSPADM

## 2019-05-09 RX ORDER — ONDANSETRON 4 MG/1
4 TABLET, FILM COATED ORAL EVERY 8 HOURS PRN
Status: CANCELLED | OUTPATIENT
Start: 2019-05-09

## 2019-05-09 RX ORDER — PANTOPRAZOLE SODIUM 40 MG/1
40 TABLET, DELAYED RELEASE ORAL DAILY
Status: CANCELLED | OUTPATIENT
Start: 2019-05-10

## 2019-05-09 RX ORDER — NICOTINE POLACRILEX 4 MG
15 LOZENGE BUCCAL PRN
Status: CANCELLED | OUTPATIENT
Start: 2019-05-09

## 2019-05-09 RX ORDER — ACETAMINOPHEN 325 MG/1
650 TABLET ORAL EVERY 4 HOURS PRN
Status: DISCONTINUED | OUTPATIENT
Start: 2019-05-09 | End: 2019-05-15 | Stop reason: HOSPADM

## 2019-05-09 RX ORDER — SENNA AND DOCUSATE SODIUM 50; 8.6 MG/1; MG/1
2 TABLET, FILM COATED ORAL 2 TIMES DAILY
Status: CANCELLED | OUTPATIENT
Start: 2019-05-09

## 2019-05-09 RX ORDER — ATORVASTATIN CALCIUM 10 MG/1
10 TABLET, FILM COATED ORAL DAILY
Status: DISCONTINUED | OUTPATIENT
Start: 2019-05-10 | End: 2019-05-15 | Stop reason: HOSPADM

## 2019-05-09 RX ORDER — ONDANSETRON 4 MG/1
4 TABLET, FILM COATED ORAL EVERY 8 HOURS PRN
Status: DISCONTINUED | OUTPATIENT
Start: 2019-05-09 | End: 2019-05-15 | Stop reason: HOSPADM

## 2019-05-09 RX ORDER — PREDNISONE 20 MG/1
60 TABLET ORAL DAILY
Status: DISCONTINUED | OUTPATIENT
Start: 2019-05-09 | End: 2019-05-09

## 2019-05-09 RX ORDER — NICOTINE POLACRILEX 4 MG
15 LOZENGE BUCCAL PRN
Status: DISCONTINUED | OUTPATIENT
Start: 2019-05-09 | End: 2019-05-15 | Stop reason: HOSPADM

## 2019-05-09 RX ORDER — ATORVASTATIN CALCIUM 10 MG/1
10 TABLET, FILM COATED ORAL DAILY
Status: CANCELLED | OUTPATIENT
Start: 2019-05-10

## 2019-05-09 RX ORDER — DOXAZOSIN MESYLATE 4 MG/1
4 TABLET ORAL NIGHTLY
Status: CANCELLED | OUTPATIENT
Start: 2019-05-09

## 2019-05-09 RX ORDER — METOPROLOL SUCCINATE 25 MG/1
25 TABLET, EXTENDED RELEASE ORAL DAILY
Status: CANCELLED | OUTPATIENT
Start: 2019-05-10

## 2019-05-09 RX ORDER — HYDROCODONE BITARTRATE AND ACETAMINOPHEN 5; 325 MG/1; MG/1
1 TABLET ORAL EVERY 6 HOURS PRN
Status: CANCELLED | OUTPATIENT
Start: 2019-05-09

## 2019-05-09 RX ORDER — PREDNISONE 20 MG/1
60 TABLET ORAL DAILY
Status: DISCONTINUED | OUTPATIENT
Start: 2019-05-09 | End: 2019-05-15 | Stop reason: HOSPADM

## 2019-05-09 RX ORDER — METOPROLOL SUCCINATE 25 MG/1
25 TABLET, EXTENDED RELEASE ORAL DAILY
Status: DISCONTINUED | OUTPATIENT
Start: 2019-05-10 | End: 2019-05-15 | Stop reason: HOSPADM

## 2019-05-09 RX ORDER — DOXAZOSIN MESYLATE 4 MG/1
4 TABLET ORAL NIGHTLY
Status: DISCONTINUED | OUTPATIENT
Start: 2019-05-09 | End: 2019-05-15 | Stop reason: HOSPADM

## 2019-05-09 RX ORDER — PANTOPRAZOLE SODIUM 40 MG/1
40 TABLET, DELAYED RELEASE ORAL DAILY
Status: DISCONTINUED | OUTPATIENT
Start: 2019-05-10 | End: 2019-05-15 | Stop reason: HOSPADM

## 2019-05-09 RX ORDER — ACETAMINOPHEN 325 MG/1
650 TABLET ORAL EVERY 4 HOURS PRN
Status: CANCELLED | OUTPATIENT
Start: 2019-05-09

## 2019-05-09 RX ORDER — SENNA AND DOCUSATE SODIUM 50; 8.6 MG/1; MG/1
2 TABLET, FILM COATED ORAL 2 TIMES DAILY
Status: DISCONTINUED | OUTPATIENT
Start: 2019-05-09 | End: 2019-05-15 | Stop reason: HOSPADM

## 2019-05-09 RX ADMIN — METOPROLOL SUCCINATE 25 MG: 25 TABLET, EXTENDED RELEASE ORAL at 08:22

## 2019-05-09 RX ADMIN — PANTOPRAZOLE SODIUM 40 MG: 40 TABLET, DELAYED RELEASE ORAL at 07:33

## 2019-05-09 RX ADMIN — Medication 10 ML: at 08:23

## 2019-05-09 RX ADMIN — SENNOSIDES, DOCUSATE SODIUM 2 TABLET: 50; 8.6 TABLET, FILM COATED ORAL at 21:30

## 2019-05-09 RX ADMIN — ENOXAPARIN SODIUM 30 MG: 30 INJECTION SUBCUTANEOUS at 08:22

## 2019-05-09 RX ADMIN — INSULIN LISPRO 1 UNITS: 100 INJECTION, SOLUTION INTRAVENOUS; SUBCUTANEOUS at 21:29

## 2019-05-09 RX ADMIN — HYDROCODONE BITARTRATE AND ACETAMINOPHEN 1 TABLET: 5; 325 TABLET ORAL at 08:26

## 2019-05-09 RX ADMIN — PREDNISONE 60 MG: 20 TABLET ORAL at 17:24

## 2019-05-09 RX ADMIN — DOXAZOSIN 4 MG: 4 TABLET ORAL at 21:30

## 2019-05-09 ASSESSMENT — PAIN DESCRIPTION - ORIENTATION: ORIENTATION: RIGHT;LEFT

## 2019-05-09 ASSESSMENT — PAIN DESCRIPTION - LOCATION: LOCATION: HAND

## 2019-05-09 ASSESSMENT — PAIN DESCRIPTION - PROGRESSION
CLINICAL_PROGRESSION: NOT CHANGED

## 2019-05-09 ASSESSMENT — PAIN - FUNCTIONAL ASSESSMENT: PAIN_FUNCTIONAL_ASSESSMENT: PREVENTS OR INTERFERES SOME ACTIVE ACTIVITIES AND ADLS

## 2019-05-09 ASSESSMENT — PAIN DESCRIPTION - PAIN TYPE: TYPE: ACUTE PAIN

## 2019-05-09 ASSESSMENT — PAIN DESCRIPTION - DESCRIPTORS: DESCRIPTORS: ACHING

## 2019-05-09 ASSESSMENT — PAIN SCALES - GENERAL
PAINLEVEL_OUTOF10: 2
PAINLEVEL_OUTOF10: 4
PAINLEVEL_OUTOF10: 0
PAINLEVEL_OUTOF10: 0

## 2019-05-09 NOTE — PROGRESS NOTES
Patient admitted to room 3267 per wheelcahair Transferred to bed with min assist. Patient and daughter  was oriented to the Call Light, Phone, TV, Thermostat, Bed Controls, Bathroom and Emergency Cord. Patient verbalized and demonstrated understanding of all. Patient was also given an over view of Unit Routines for Acute Rehab, including what to wear for therapy. The patient's role in goal setting was reviewed along with an explanation of the Interdisciplinary Team meeting, the 's role in coordinating services and the Discharge Planning/Continuum of Care process. Patient Rights and Responsibilities were reviewed. Meal times were explained, including how to order food. The white board (used for communication) was pointed out emphasizing  the 3 hours/day Therapy Schedule (posted most evenings), the number (and process) for reporting grievances, and the Doctor's, Nurse's, and PCA's names. It was recommended that any family that will be care givers or any care givers the patient has, take part in therapy. There are no set visiting hours, and it was suggested that non-caregiver friends and family visitors come after therapy (at 4 PM or later) to allow patient to rest in between sessions.

## 2019-05-09 NOTE — CONSULTS
[facility]  5/9/2019  11:23 AM    Patient Name:   Eduardo Contreras  YOB: 1929    Diagnosis: <principal problem not specified>        Subjective: Rehabilitation consult received. Chart reviewed. Patient discussed with our rehabilitation unit admission nurse. This patient is an 66-year-old white male with a history of atrial fibrillation, previous GI bleeding, and myasthenia gravis. The patient came to the hospital with dizziness, syncopal episode  And striking his head and face when he fell down, and found to have significant bradycardia with a pulse in the 20s. CT scanning of his facial bones revealed an inferior orbital fracture as well as a possible left radial head fracture. The patient does have significant ecchymosis about the left eye. ppatient was seen by orthopedics and left radial head  Fracture was ruled out. Patient was seen by cardiology, and permanent pacemaker placement was recommended. He had his permanent pacemaker placed by cardiology on 5/7. Patient was started therapies afterwards, but is weak overall, not safe to return to independent living. He is now admitted to our rehabilitation unit to improve his strength, gait, balance, and safety before discharge.     BP (!) 145/84   Pulse 80   Temp 98 °F (36.7 °C) (Oral)   Resp 16   Ht 5' 7\" (1.702 m)   Wt 181 lb (82.1 kg)   SpO2 93%   BMI 28.35 kg/m²     Last 24 hour lab  Recent Results (from the past 24 hour(s))   POCT Glucose    Collection Time: 05/08/19 11:35 AM   Result Value Ref Range    POC Glucose 221 (H) 70 - 99 mg/dl    Performed on ACCU-CHEK    POCT Glucose    Collection Time: 05/08/19  4:50 PM   Result Value Ref Range    POC Glucose 107 (H) 70 - 99 mg/dl    Performed on ACCU-CHEK    POCT Glucose    Collection Time: 05/08/19  7:44 PM   Result Value Ref Range    POC Glucose 104 (H) 70 - 99 mg/dl    Performed on ACCU-CHEK    CBC auto differential    Collection

## 2019-05-09 NOTE — CARE COORDINATION
Received nh predict tool and Interqual review. Left message for UR nurse. Discussed nh predict tool & Interqual review with JUAN.

## 2019-05-09 NOTE — PROGRESS NOTES
Standing Balance  Time: 2-3 min  Activity: ADL's, amb with RW short distances in room  Functional Mobility  Functional - Mobility Device: Rolling Walker  Activity: To/from bathroom  Assist Level: Contact guard assistance  Wheelchair Bed Transfers  Wheelchair/Bed - Technique: Ambulating  Equipment Used: (recliner, chair with arms)  Level of Asssistance: Contact guard assistance  Wheelchair Transfers Comments: RW; cues for hand placement                        Cognition  Overall Cognitive Status: WNL       LUE AROM (degrees)  LUE AROM : Exceptions  LUE General AROM: limited to 90 degrees by pacemaker precautions         Plan   Plan  Times per week: 3-5  Times per day: Daily  Current Treatment Recommendations: Strengthening, Functional Mobility Training, Patient/Caregiver Education & Training, ROM, Endurance Training, Balance Training, Self-Care / ADL    AM-PAC Score        Goals  Short term goals  Time Frame for Short term goals: Status:  goals ongoing, except where noted below  Short term goal 1: Pt will complete fxl transfers with supervision and AD  Short term goal 2: Pt will complete LB dressing with supervision  Short term goal 3: Pt will toilet with supervision  Short term goal 4: Pt will perform UB dressing with Min a- goal met, advance to complete with Supervision, regarding pacemanker precautions  Short term goal 5: Pt will adhere to pacemaker precautions during fxl tasks independently  Long term goals  Time Frame for Long term goals : LTG = STG  Patient Goals   Patient goals : to return to independent living       Therapy Time   Individual Concurrent Group Co-treatment   Time In 1006         Time Out 1045         Minutes 39               Chris ANTOINE/L,515  This note to serve as discharge summary if pt d/c'd prior to next session

## 2019-05-09 NOTE — CARE COORDINATION
DC ordered. Spoke to Saundra Payan, [facility] ARU admissions, pt has been accepted for transfer today.  Spoke to GLENYS Diamond PLAN: [address]    Cody Zambrano RN  Case management  [phone]

## 2019-05-09 NOTE — PROGRESS NOTES
Physical Therapy    Facility/Department: [address] ICU  Treatment Note    NAME: Vita Thompson  : 12/15/1929  MRN: [id number]    Date of Service: 2019    Discharge Recommendations:  Continue to assess pending progress   PT Equipment Recommendations  Other: Will monitor for potential equipt needs. Vita Thompson scored a 16/24 on the AM-PAC short mobility form. Current research shows that an AM-PAC score of 17 or less is typically not associated with a discharge to the patient's home setting. Based on the patients AM-PAC score and their current functional mobility deficits, it is recommended that the patient have 5-7 sessions per week of Physical Therapy at d/c to increase the patients independence. Assessment   Body structures, Functions, Activity limitations: Decreased functional mobility ; Decreased safe awareness;Decreased endurance;Decreased balance;Decreased vision/visual deficit  Assessment: 79 y/o male admit 19 with Syncope/Collapse, Bradycardia, CHF and CHI (Fall at Home). CT Head/C-Spine negative. CT Facial Bones - L Inferior Orbital Wall Fx. X-Ray L Elbow - Possible Nondisplaced Radial Head Fx (per Ortho 19 - \"I do not think the pt has Radial Head Fx.\"). PMH as noted including Myasthenia Gravis, Exp Lap,  Back Surg, B THR, R Elbow/L Hand and B Feet (Club Foot) Surgs, Arthritis. PTA pt resides independent living (moved there following death of spouse approx 5 mos ago). Fall prior to admit due to Bradycardia with several injuries as noted; s/p Pacemaker 19 with L UE post-op limit affecting care. Visual deficits (diplopia) due to Myasthenia Gravis also affecting recovery. Recommend In-Pt Rehab consult at this time. Pt/family appear receptive at this time. Prognosis: Good  Decision Making: Medium Complexity  Patient Education: Role of PT, POC, Need to call for assist, L UE Restrictions following recent Pacemaker.    REQUIRES PT FOLLOW UP: Yes  Activity Tolerance  Activity Tolerance: Patient Tolerated treatment well;Patient limited by endurance       Patient Diagnosis(es): The primary encounter diagnosis was Syncope and collapse. Diagnoses of Closed head injury, initial encounter, Bradycardia, Closed nondisplaced fracture of head of left radius, initial encounter, Contusion of left hand, initial encounter, Contusion of left knee, initial encounter, Periorbital hematoma of left eye, Closed fracture of orbital wall, initial encounter ([facility] [address] and Leukopenia, unspecified type were also pertinent to this visit. has a past medical history of Arthritis, Hyperlipidemia, Hypertension, Myasthenia gravis, and Polycystic kidney. has a past surgical history that includes joint replacement; back surgery; Tonsillectomy; Lithotripsy; orthopedic surgery; Cholecystectomy; other surgical history; Cataract removal with implant (Right, 2/22/13); Cataract removal with implant (Left, 3/22/13); Hand surgery; Small intestine surgery (4/9/13); Colon surgery; fracture surgery; Upper gastrointestinal endoscopy (9/10/14); Upper gastrointestinal endoscopy (9-17-14); and Upper gastrointestinal endoscopy (04/18/2018). Restrictions  Restrictions/Precautions  Restrictions/Precautions: Weight Bearing  Implants present? : Pacemaker  Upper Extremity Weight Bearing Restrictions  Left Upper Extremity Weight Bearing: Weight Bearing As Tolerated  Other: Ortho (Dr. Burch Poag at bedside today 5/8/19) -consulted for possible L Radial Head Fx :  \"I do not think the patient has a Radial Head Fx\", no wgt bear or activity restrictions. Position Activity Restriction  Other position/activity restrictions: New Pacemaker placed 5/7/19 - L UE ROM Restrictions.    Vision/Hearing  Vision: Impaired(Diplopia due to Myasthenia Gravis.  )  Hearing: Within functional limits     Subjective  General  Chart Reviewed: Yes  Patient assessed for rehabilitation services?: Yes  Additional Pertinent Hx: 81 y/o male admit 5/7/19 with Syncope/Collapse, Bradycardia, CHF and CHI (Fall at Home). CT Head/C-Spine negative. CT Facial Bones - L Inferior Orbital Wall Fx. X-Ray L Elbow - Possible Nondisplaced Radial Head Fx (per Ortho 5/8/19 - \"I do not think the pt has Radial Head Fx.\"). PMH as noted including Myasthenia Gravis, Exp Lap,  Back Surg, B THR, R Elbow/L Hand and B Feet (Club Foot) Surgs, Arthritis. Family / Caregiver Present: No  Referring Practitioner: Dr. Myriam Fonseca  Diagnosis: Syncope/Collapse, Bradycardia, CHF, and CHI (fall at home) S/P Pacemaker 5/7/19. Follows Commands: Within Functional Limits  Subjective  Subjective: Pt agreeable to cont PT Rx. Pt reports some soreness hands (MCP jts) - landed on them when fall at home recently. No pain scale rating given by pt. Pain Screening  Patient Currently in Pain: Yes          Orientation  Orientation  Overall Orientation Status: Within Functional Limits  Social/Functional History  Social/Functional History  Lives With: Alone  Type of Home: Facility([facility]). )  Home Layout: One level  Home Access: Level entry  Bathroom Shower/Tub: Tub/Shower unit(Cut-out Tub.)  Bathroom Toilet: Handicap height  Bathroom Equipment: Grab bars in shower, Shower chair, Grab bars around toilet  Bathroom Accessibility: Accessible  Home Equipment: Rolling walker, [employer] Help From: Family(pt [facility] staff at [facility] for him)  ADL Assistance: Independent  Ambulation Assistance: Independent(With use of Walker (prn). )  Transfer Assistance: Independent  Active : Yes(Pt reports that he does drive despite visual deficits. )  Mode of Transportation: Car  Occupation: Retired  Type of occupation: Retired - . IADL Comments: Facility cleans living areas. Pt takes care of light cleaning, simple meals (facility dining area also available). Pt drives and independent otherwise.    Cognition        Objective     Observation/Palpation  Observation: Bruising noted L Eye; Small Abrasion Forehead, L Elbow, L Knee. Bed mobility  Supine to Sit: Minimal assistance  Comment: Advised pt to limit use L UE to assist to EOB due to recent Pacemaker. Transfers  Sit to Stand: Contact guard assistance(With Walker. )  Stand to sit: Contact guard assistance(With Walker.  )  Comment: Cues for safe hand placement, diminish wgt bear L UE due to recent Pacemaker. No c/o lighthead with EOB/OOB. Ambulation  Ambulation?: Yes  Ambulation 1  Surface: level tile  Device: Rolling Walker  Quality of Gait: Pt amb 50' x 2 with Rolling  Walker CGA. Posture slight flexed, diminished step length/clearance although no LE buckling/giving way. Cues for safe positioning prior return to chair. Pt with tendency to initiate return to chair prior safe positioning. Plan   Plan  Times per week: 3-5x week while in acute care setting. Current Treatment Recommendations: Balance Training, Functional Mobility Training, Transfer Training, Gait Training, Safety Education & Training, Patient/Caregiver Education & Training  Safety Devices  Type of devices: Call light within reach, Chair alarm in place, Left in chair, Nurse notified(Pt aware to call for assist.  PT spoke with pt's nurse Anisha Cartagena). )    G-Code       OutComes Score                                                  AM-PAC Score  AM-PAC Inpatient Mobility Raw Score : 16  AM-PAC Inpatient T-Scale Score : 40.78  Mobility Inpatient CMS 0-100% Score: 54.16  Mobility Inpatient CMS G-Code Modifier : CK          Goals  Short term goals  Time Frame for Short term goals: Upon d/c acute care setting. Short term goal 1: Bed Mob Independent. Short term goal 2: Transfers with assist device Independent. Short term goal 3: Amb with assist device 250' Independent. Patient Goals   Patient goals : Return to Independent living.          Therapy Time   Individual Concurrent Group Co-treatment   Time In 0830         Time Out 0900         Minutes [address] [place] [address],

## 2019-05-09 NOTE — DISCHARGE SUMMARY
[address] [place] [id number]                               DISCHARGE SUMMARY    PATIENT NAME: Amisha Real                   :        12/15/1929  MED REC NO:   [id number]                          ROOM:       872  ACCOUNT NO:   [de-identified]                           ADMIT DATE: 2019  PROVIDER:     Naila Souza MD                  DISCHARGE DATE:        FINAL DIAGNOSES:  1. Cardiogenic syncope. 2.  Atrial fibrillation with slow ventricular response. 3.  Closed fracture of the left inferior orbital ridge. 4.  Ocular myasthenia gravis. 5.  History of GI bleed. 7.  Stage III chronic kidney disease. 9.  Hyperglycemia. 10.  Polycystic kidney disease. 11.  Hypertension. 12.  Hyperlipidemia. PERTINENT HISTORY:  This patient is an 42-year-old white male who lives  in the [place] [address]. He does  say that he has a history of atrial fibrillation, previous GI bleeding,  and myasthenia gravis which appears to be of the ocular variety. The patient states that on the evening of admission, after lying down,  he felt somewhat \"dizzy\". He apparently sat up on the side of his bed  then apparently had a syncopal spell. He fell to the floor, striking  his head and face. He was unresponsive for an indeterminate period of  time but he came around and was able to find his phone and summon his  daughter early on the morning of this admission. The patient was  brought to the ER by the paramedics where in the ER, he was found to  have significant bradycardia. The patient's heart rate was in the 20s  to 30s with slow atrial fibrillation with left anterior hemiblock and  right bundle branch block. When seen, the patient remained bradycardic but was stable. He was not  dizzy and denied any chest pain or shortness of breath.   The patient was  on a small dose of a beta blocker which was held.  Further evaluation in  the Emergency Room revealed CT scanning of his facial bones showing an  inferior orbital fracture as well as a possible left radial head  fracture. The patient did have some significant ecchymosis about his  left eye. So because of the syncope and significant bradycardia with the  probability of cardiogenic syncope, the patient was admitted to the  monitored bed for further evaluation and treatment. PHYSICAL EXAMINATION:  VITAL SIGNS:  Pertinent physical findings revealed that his blood  pressure is 142/80, pulse was 30 and irregular. HEENT:  Revealed ecchymosis below the left eye. He had some tenderness  of the inferior orbital ridge. CHEST:  Clear. CARDIAC:  Exam revealed the rhythm to be irregular and the rate to be  slow. NEUROLOGIC:  He was intact. ADMISSION LABORATORY DATA:  Revealed that his proBNP was 3133. BMP  revealed a BUN of 26, a creatinine of 1.8. This was apparently stable  for this patient. His hemoglobin is 11.5, white blood count was only  1700 without a differential being performed, platelet count 349,356. Lactic acid was 2.4. EKG revealed atrial fibrillation with a slow  ventricular response. He had a left anterior hemiblock and right bundle  block. Chest x-ray revealed no acute abnormalities. CT scanning of the  facial bones did reveal an inferior orbital ridge fracture. There is  also some question of a possible left elbow nondisplaced radial head  fracture. HOSPITAL COURSE:  This 54-year-old white male with chronic atrial  fibrillation was admitted to the hospital with syncope secondary to slow  atrial fibrillation. The patient was admitted through a monitored bed  and seen in consultation by Cardiology who recommended and implemented  placing of a pacemaker. The procedure went well and the patient did  well without further evidence of bradycardia and/or syncope. The patient does have a fracture of the inferior orbital ridge.   We were  unable to get ENT to see this patient during the acute hospital stay. It should be noted that the patient does have a history of ocular  myasthenia and does frequently have episodes of diplopia. On exam, the  patient was noted to have a lateral rectus palsy but this apparently is  not new and is a manifestation of his ocular myasthenia as opposed to  being related to any type of entrapment. I have attempted to get  Ophthalmology to see this patient while he is at the hospital.  It is  unclear whether we can get an ophthalmologist to come and see him  though. With the patient overall stable though, it was felt that he needed  further physical and occupational therapy. The patient will be going to  our inpatient rehab unit here. I will follow up on ENT and  Ophthalmology while he is here. Please see the patient's med  reconciliation sheet for all his discharge medications.         Austen Newby MD    D: 05/09/2019 8:06:46       T: 05/09/2019 17:14:25     [email or web address]  Job#: [id number]     Doc#: [id number]    CC:  <>

## 2019-05-09 NOTE — PROGRESS NOTES
0830- Patient awake and oriented, vitals stable, breakfast ordered for him and needs met. Dr. Cat Silverio by to see patient, discharge order, plan for inpatient rehab. meds administered without issues, patient medicated w/ Norco for ongoing hand pain. 0900- PT in to work w/ patient, assisted up to chair, set up w/ breakfast. Call received from case management, patient accepted to rehab, awaiting time for admission. 1020- OT in working w/ patient, patient dressing in his clothes  [phone]- telephone report given to Sophia Farias RN in inpatient rehab.  Awaiting MD orders then will transfer patient to [facility] [phone]- patient taken in wheelchair w/ daughter and transporter to [facility] rehab, discharged at this time    Electronically signed by Js Quintana RN on 5/9/2019 at 2:50 PM

## 2019-05-10 LAB
ANION GAP SERPL CALCULATED.3IONS-SCNC: 9 MMOL/L (ref 3–16)
BASOPHILS ABSOLUTE: 0 K/UL (ref 0–0.2)
BASOPHILS RELATIVE PERCENT: 0.2 %
BUN BLDV-MCNC: 25 MG/DL (ref 7–20)
CALCIUM SERPL-MCNC: 8.6 MG/DL (ref 8.3–10.6)
CHLORIDE BLD-SCNC: 109 MMOL/L (ref 99–110)
CO2: 24 MMOL/L (ref 21–32)
CREAT SERPL-MCNC: 1.4 MG/DL (ref 0.8–1.3)
EOSINOPHILS ABSOLUTE: 0 K/UL (ref 0–0.6)
EOSINOPHILS RELATIVE PERCENT: 0.1 %
GFR AFRICAN AMERICAN: 58
GFR NON-AFRICAN AMERICAN: 48
GLUCOSE BLD-MCNC: 166 MG/DL (ref 70–99)
GLUCOSE BLD-MCNC: 184 MG/DL (ref 70–99)
GLUCOSE BLD-MCNC: 186 MG/DL (ref 70–99)
GLUCOSE BLD-MCNC: 199 MG/DL (ref 70–99)
GLUCOSE BLD-MCNC: 261 MG/DL (ref 70–99)
HCT VFR BLD CALC: 35.1 % (ref 40.5–52.5)
HEMOGLOBIN: 11.6 G/DL (ref 13.5–17.5)
LYMPHOCYTES ABSOLUTE: 0.4 K/UL (ref 1–5.1)
LYMPHOCYTES RELATIVE PERCENT: 16.1 %
MAGNESIUM: 2 MG/DL (ref 1.8–2.4)
MCH RBC QN AUTO: 28.7 PG (ref 26–34)
MCHC RBC AUTO-ENTMCNC: 33.1 G/DL (ref 31–36)
MCV RBC AUTO: 86.9 FL (ref 80–100)
MONOCYTES ABSOLUTE: 0.1 K/UL (ref 0–1.3)
MONOCYTES RELATIVE PERCENT: 2.5 %
NEUTROPHILS ABSOLUTE: 2.1 K/UL (ref 1.7–7.7)
NEUTROPHILS RELATIVE PERCENT: 81.1 %
PDW BLD-RTO: 15.5 % (ref 12.4–15.4)
PERFORMED ON: ABNORMAL
PLATELET # BLD: 133 K/UL (ref 135–450)
PMV BLD AUTO: 8.8 FL (ref 5–10.5)
POTASSIUM REFLEX MAGNESIUM: 5 MMOL/L (ref 3.5–5.1)
RBC # BLD: 4.04 M/UL (ref 4.2–5.9)
SODIUM BLD-SCNC: 142 MMOL/L (ref 136–145)
WBC # BLD: 2.7 K/UL (ref 4–11)

## 2019-05-10 PROCEDURE — 93010 ELECTROCARDIOGRAM REPORT: CPT | Performed by: INTERNAL MEDICINE

## 2019-05-10 PROCEDURE — 97162 PT EVAL MOD COMPLEX 30 MIN: CPT | Performed by: PHYSICAL THERAPIST

## 2019-05-10 PROCEDURE — 97116 GAIT TRAINING THERAPY: CPT | Performed by: PHYSICAL THERAPIST

## 2019-05-10 PROCEDURE — 99233 SBSQ HOSP IP/OBS HIGH 50: CPT | Performed by: INTERNAL MEDICINE

## 2019-05-10 PROCEDURE — 6360000002 HC RX W HCPCS: Performed by: PHYSICAL MEDICINE & REHABILITATION

## 2019-05-10 PROCEDURE — 85025 COMPLETE CBC W/AUTO DIFF WBC: CPT

## 2019-05-10 PROCEDURE — 97530 THERAPEUTIC ACTIVITIES: CPT | Performed by: PHYSICAL THERAPIST

## 2019-05-10 PROCEDURE — 97530 THERAPEUTIC ACTIVITIES: CPT

## 2019-05-10 PROCEDURE — 94760 N-INVAS EAR/PLS OXIMETRY 1: CPT

## 2019-05-10 PROCEDURE — 36415 COLL VENOUS BLD VENIPUNCTURE: CPT

## 2019-05-10 PROCEDURE — 80048 BASIC METABOLIC PNL TOTAL CA: CPT

## 2019-05-10 PROCEDURE — 1280000000 HC REHAB R&B

## 2019-05-10 PROCEDURE — 93005 ELECTROCARDIOGRAM TRACING: CPT | Performed by: INTERNAL MEDICINE

## 2019-05-10 PROCEDURE — 6370000000 HC RX 637 (ALT 250 FOR IP): Performed by: PHYSICAL MEDICINE & REHABILITATION

## 2019-05-10 PROCEDURE — 97167 OT EVAL HIGH COMPLEX 60 MIN: CPT

## 2019-05-10 PROCEDURE — 83735 ASSAY OF MAGNESIUM: CPT

## 2019-05-10 PROCEDURE — 97535 SELF CARE MNGMENT TRAINING: CPT

## 2019-05-10 RX ADMIN — DOXAZOSIN 4 MG: 4 TABLET ORAL at 20:59

## 2019-05-10 RX ADMIN — SENNOSIDES, DOCUSATE SODIUM 2 TABLET: 50; 8.6 TABLET, FILM COATED ORAL at 08:43

## 2019-05-10 RX ADMIN — SENNOSIDES, DOCUSATE SODIUM 2 TABLET: 50; 8.6 TABLET, FILM COATED ORAL at 20:59

## 2019-05-10 RX ADMIN — INSULIN LISPRO 1 UNITS: 100 INJECTION, SOLUTION INTRAVENOUS; SUBCUTANEOUS at 17:40

## 2019-05-10 RX ADMIN — ENOXAPARIN SODIUM 30 MG: 30 INJECTION SUBCUTANEOUS at 08:38

## 2019-05-10 RX ADMIN — METOPROLOL SUCCINATE 25 MG: 25 TABLET, EXTENDED RELEASE ORAL at 08:42

## 2019-05-10 RX ADMIN — INSULIN LISPRO 2 UNITS: 100 INJECTION, SOLUTION INTRAVENOUS; SUBCUTANEOUS at 20:59

## 2019-05-10 RX ADMIN — ATORVASTATIN CALCIUM 10 MG: 10 TABLET, FILM COATED ORAL at 08:43

## 2019-05-10 RX ADMIN — INSULIN LISPRO 1 UNITS: 100 INJECTION, SOLUTION INTRAVENOUS; SUBCUTANEOUS at 11:24

## 2019-05-10 RX ADMIN — PREDNISONE 60 MG: 20 TABLET ORAL at 08:42

## 2019-05-10 RX ADMIN — PANTOPRAZOLE SODIUM 40 MG: 40 TABLET, DELAYED RELEASE ORAL at 08:38

## 2019-05-10 ASSESSMENT — ENCOUNTER SYMPTOMS
GASTROINTESTINAL NEGATIVE: 1
RESPIRATORY NEGATIVE: 1

## 2019-05-10 ASSESSMENT — PAIN SCALES - GENERAL
PAINLEVEL_OUTOF10: 0

## 2019-05-10 NOTE — PROGRESS NOTES
Physical Therapy    Facility/Department: [address] IP REHAB  Initial Assessment- AM and PM session    NAME: Mery Ibarra  : 12/15/1929  MRN: [id number]    Date of Service: 5/10/2019    Discharge Recommendations:  Continue to assess pending progress, 5-7 sessions per week   PT Equipment Recommendations  Equipment Needed: No  Other: Will monitor for potential equipt needs. Assessment   Body structures, Functions, Activity limitations: Decreased functional mobility ; Decreased ADL status; Decreased endurance;Decreased balance;Decreased vision/visual deficit; Increased Pain;Decreased safe awareness;Decreased strength  Assessment: Patient is an 81 y/o with S/P pacemaker on . Patient initially admitted to acute [facility] on 19 with Syncope/Collapse, Bradycardia, CHF and CHI (Fall at [facility]). CT Head/C-Spine negative. CT Facial Bones - L Inferior Orbital Wall Fx. X-Ray L Elbow - Possible Nondisplaced Radial Head Fx (per Ortho 19 - \"I do not think the pt has Radial Head Fx.\"). PMH as noted including Myasthenia Gravis, Exp Lap,  Back Surg, B THR, R Elbow/L Hand and B Feet (Club Foot) Surgs, Arthritis. PTA pt resides independent living (moved there following death of spouse approx 5 mos ago). Fall prior to admit due to Bradycardia with several injuries as noted. Patient presently, CGA for transfers with cues to lightly push through L LE, ambulated with wheeled walker with CGA and performs bed mobility with SBA with cues for pacemaker precautions. Patient limited by endurance, noted HR at baseline 110 bpm, decreased balance and limited safety with wheeled walker( impulsive). patient would benefit from inpatient rehab to improve overall independence with mobility withor without device to return home to independent facility. Prognosis: Good  Decision Making: Medium Complexity  History: 81 y/o male admit 19 with Syncope/Collapse, Bradycardia, CHF and CHI (Fall at Home). CT Head/C-Spine negative.   CT Facial Bones - L Inferior Orbital Wall Fx. X-Ray L Elbow - Possible Nondisplaced Radial Head Fx (per Ortho 5/8/19 - \"I do not think the pt has Radial Head Fx.\"). PMH as noted including Myasthenia Gravis, Exp Lap,  Back Surg, B THR, R Elbow/L Hand and B Feet (Club Foot) Surgs, Arthritis. Exam: see above  Clinical Presentation: evolving  Patient Education: rehab expectations, goals, pacemaker precautions  Barriers to Learning: impulsive  REQUIRES PT FOLLOW UP: No  Activity Tolerance  Activity Tolerance: Patient Tolerated treatment well;Patient limited by endurance  Activity Tolerance: HR at rest 110       Patient Diagnosis(es): There were no encounter diagnoses. has a past medical history of Arthritis, Hyperlipidemia, Hypertension, Myasthenia gravis, and Polycystic kidney. has a past surgical history that includes joint replacement; back surgery; Tonsillectomy; Lithotripsy; orthopedic surgery; Cholecystectomy; other surgical history; Cataract removal with implant (Right, 2/22/13); Cataract removal with implant (Left, 3/22/13); Hand surgery; Small intestine surgery (4/9/13); Colon surgery; fracture surgery; Upper gastrointestinal endoscopy (9/10/14); Upper gastrointestinal endoscopy (9-17-14); and Upper gastrointestinal endoscopy (04/18/2018). Restrictions  Restrictions/Precautions  Implants present? : Pacemaker(5/7/19)  Upper Extremity Weight Bearing Restrictions  Left Upper Extremity Weight Bearing: Weight Bearing As Tolerated  Other: Ortho (Dr. Gloria Perdue at bedside today 5/8/19) -consulted for possible L Radial Head Fx :  \"I do not think the patient has a Radial Head Fx\", no wgt bear or activity restrictions. Position Activity Restriction  Other position/activity restrictions: New Pacemaker placed 5/7/19 - L UE ROM Restrictions (Do not elevate affected arm above shoulder for 30 days. No showering for one week)  Vision/Hearing  Vision: Impaired(Diplopia due to Myasthenia Gravis.   Patient reports improving with Balance  Sitting - Static: Good  Sitting - Dynamic: Good  Standing - Static: Good  Standing - Dynamic: Fair;+  Comments: Unsteady dynamic balance L- need wheeled walker at this time      Second session-  S/ Patient reports does not want home care at home since had bed experience with wife and does not want to pay the money. Patient agreeable to therapy. O/ PT listened to patient and explained will make recommendations for care at home closer to D/C, most likely will not need an aide, and check with SW about coverage for home therapy. Patient performed car transfer with CGA/SBA with cues for hand placement. Ambulated with wheeled walker with increase steppage gait with L LE due to leg length discrepancy 200' x 2 with SBA/CGA, HR varied from 65 bpm to 105 bpm  Patient demonstrated picking up object with CGA but cued to not WB through L UE  Patient to return to room via transporter  Plan   Plan  Times per week: 5-6x  week while in acute care setting. Times per day: Twice a day  Plan weeks: 1 week  Current Treatment Recommendations: Balance Training, Functional Mobility Training, Transfer Training, Gait Training, Safety Education & Training, Patient/Caregiver Education & Training, ADL/Self-care Training, Endurance Training, Stair training, Home Exercise Program, Equipment Evaluation, Education, & procurement, Strengthening  Safety Devices  Type of devices: Call light within reach, Chair alarm in place, Left in chair, All fall risk precautions in place, Gait belt(Pt aware to call for assist.  PT spoke with pt's nurse Ceferino Ferguson). )  Restraints  Initially in place: No    Goals  Short term goals  Time Frame for Short term goals: 5-7 days  Short term goal 1: Bed Mob Independent. Short term goal 2: Transfers with assist device  modified Independent. Short term goal 3: Amb with assist device 200' modified Independent.    Short term goal 4: ascend /descend curb step with wheeled walker with supervision  Short term goal 5:  ascend/descend 12 steps with B rails with SBA  Long term goals  Time Frame for Long term goals : STG=LTG  Patient Goals   Patient goals : Return to Independent living and back to my regular life       Therapy Time   Individual Concurrent Group Co-treatment   Time In 1125         Time Out 1215         Minutes 50         Timed Code Treatment Minutes: Mary 45   [place] [facility] [place] [address] [facility]

## 2019-05-10 NOTE — PROGRESS NOTES
radial head  Fracture was ruled out. Patient was seen by cardiology, and permanent pacemaker placement was recommended. He had his permanent pacemaker placed by cardiology on 5/7. Shayna Brantley was started therapies afterwards, but is weak overall, not safe to return to independent living. Otis Estrada is now admitted to our rehabilitation unit to improve his strength, gait, balance, and safety before discharge. Patient states he has double vision from his myasthenia gravis, and needs to start on prednisone, which he uses to treat this problem at home. We'll start him on 60 mg of prednisone daily at this time. (copied per note 5/10/19)  Exam: bathing, dressing, grooming, transfers, functional mobility, UE function, cognition  Assistance / Modification: min assist LB dressing, CGA bathing at sink, CGA /SBA transfers with rolling walker  Patient Education: role of OT, goals, PPM precautions  REQUIRES OT FOLLOW UP: Yes  Activity Tolerance  Activity Tolerance: Patient Tolerated treatment well  Safety Devices  Safety Devices in place: Yes  Type of devices: Gait belt;Patient at risk for falls;Call light within reach; Chair alarm in place; Left in chair           Patient Diagnosis(es): debility   has a past medical history of Arthritis, Hyperlipidemia, Hypertension, Myasthenia gravis, and Polycystic kidney. has a past surgical history that includes joint replacement; back surgery; Tonsillectomy; Lithotripsy; orthopedic surgery; Cholecystectomy; other surgical history; Cataract removal with implant (Right, 2/22/13); Cataract removal with implant (Left, 3/22/13); Hand surgery; Small intestine surgery (4/9/13); Colon surgery; fracture surgery; Upper gastrointestinal endoscopy (9/10/14); Upper gastrointestinal endoscopy (9-17-14); and Upper gastrointestinal endoscopy (04/18/2018).     Treatment Diagnosis: decreased ADl, balance, IADL,       Restrictions  Restrictions/Precautions  Implants present? : Pacemaker(5/7/19)  Upper Extremity Weight Bearing Restrictions  Left Upper Extremity Weight Bearing: Weight Bearing As Tolerated  Other: Ortho (Dr. Ravin Hoffman at bedside today 5/8/19) -consulted for possible L Radial Head Fx :  \"I do not think the patient has a Radial Head Fx\", no wgt bear or activity restrictions. Position Activity Restriction  Other position/activity restrictions: New Pacemaker placed 5/7/19 - L UE ROM Restrictions (Do not elevate affected arm above shoulder for 30 days. No showering for one week)    Subjective   General  Chart Reviewed: Yes  Additional Pertinent Hx: This patient is an 63-year-old white male with a history of atrial fibrillation, previous GI bleeding, and myasthenia gravis. The patient came to the hospital with dizziness, syncopal episode  And striking his head and face when he fell down, and found to have significant bradycardia with a pulse in the 20s.  CT scanning of his facial bones revealed an inferior orbital fracture as well as a possible left radial head fracture. The patient does have significant ecchymosis about the left eye. ppatient was seen by orthopedics and left radial head  Fracture was ruled out. Patient was seen by cardiology, and permanent pacemaker placement was recommended. He had his permanent pacemaker placed by cardiology on 5/7. Lord Crews was started therapies afterwards, but is weak overall, not safe to return to independent living. [facility] is now admitted to our rehabilitation unit to improve his strength, gait, balance, and safety before discharge. Patient states he has double vision from his myasthenia gravis, and needs to start on prednisone, which he uses to treat this problem at home. We'll start him on 60 mg of prednisone daily at this time. (copied per note 5/10/19)  Family / Caregiver Present: No  Referring Practitioner: Heis  Diagnosis: debility  Subjective  Subjective: Pt met bedside, agreeable to OT, order states no shower 1 week from5/7 with pacemaker placement.   States he has no pain except for knuckles where he passed out, falling on them     Social/Functional History  Social/Functional History  Lives With: Alone  Type of Home: Facility([facility]). )  Home Layout: One level  Home Access: Level entry  Bathroom Shower/Tub: Tub/Shower unit(Cut-out Tub.)  Bathroom Toilet: Handicap height  Bathroom Equipment: Grab bars in shower, Shower chair, Grab bars around toilet  Bathroom Accessibility: Accessible  Home Equipment: Rolling walker, Cane  ADL Assistance: Independent  Homemaking Assistance: Needs assistance  Homemaking Responsibilities: Yes  Ambulation Assistance: Independent(With use of Walker (prn). )  Transfer Assistance: Independent  Active : Yes(Pt reports that he does drive despite visual deficits. )  Occupation: Retired  Type of occupation: Retired - . Leisure & Hobbies: builds leggos  IADL Comments: Facility cleans living areas. Pt takes care of light cleaning, simple meals (facility dining area also available). Eats dinner meal in dining room. Pt drives and independent otherwise. Additional Comments: has removable tray for walker at home       Objective   Vision: Impaired(Diplopia due to Myasthenia Gravis.  )  Hearing: Exceptions to [facility]  Hearing Exceptions: Bilateral hearing aid(does not wear them, he thinks his hearing is fine)    Orientation  Overall Orientation Status: Within Functional Limits     Balance  Sitting Balance: Supervision  Standing Balance: Stand by assistance  Functional Mobility  Functional - Mobility Device: Rolling Walker  Activity: To/from bathroom  Assist Level: Contact guard assistance  Functional Mobility Comments: CGA/SBA   Toilet Transfers  Toilet - Technique: Ambulating  Equipment Used: Raised toilet seat with rails  Toilet Transfer: Contact guard assistance;Stand by assistance  Toilet Transfers Comments: has handicap height commode and grab bars at home.   CGA/SBA  Shower Transfers  Shower Transfers Comments: order states no shower 1 week from 5/7. May shower 5/14  Wheelchair Bed Transfers  Wheelchair/Bed - Technique: Ambulating  Equipment Used: Bed  Level of Asssistance: Contact guard assistance;Stand by assistance  Wheelchair Transfers Comments: RW; cues for hand placement. Educated pt to reach back with right arm first, only guide with left (PT from acute stated physician stated pt could push lightly with left UE  ADL  Grooming: Setup  UE Bathing: Setup  LE Bathing: Stand by assistance(sat to cross legs to reach feet, stood with SBA to bathe buttocks)  UE Dressing: Setup  LE Dressing: Minimal assistance(able to cross legs to alexis socks and shoes, thread underwear, pants over feet. Sit to stand with min assist to manage pants up, then only CGA/SBA for balance)  Toileting: Contact guard assistance(urinated on commode, managed pants up/down)  Additional Comments: sponge bath standing at sink above knees, seated below knees. Good carryover for bathing/dressing with PPM precautions except cues with sit/stand  Tone RUE  RUE Tone: Normotonic  Tone LUE  LUE Tone: Normotonic  Coordination  Movements Are Fluid And Coordinated: Yes     Bed mobility  Supine to Sit: Supervision(using right arm to push up)  Sit to Supine: Supervision        Cognition  Overall Cognitive Status: WFL  Perception  Overall Perceptual Status: WFL     Sensation  Overall Sensation Status: WFL        LUE AROM (degrees)  LUE AROM : Exceptions  LUE General AROM: limited to 90 degrees by pacemaker precautions. Dupuytrens contracture left hand digit D4,5  RUE AROM (degrees)  RUE AROM : WFL  LUE Strength  LUE Strength Comment: NT due to recent pacemaker placement. RUE Strength  Gross RUE Strength: WFL                   Plan   Plan  Times per week: 5-6 days per week  Times per day: Twice a day  Plan weeks: 1 week pt will. .  Current Treatment Recommendations: Strengthening, Functional Mobility Training, Patient/Caregiver Education & Training, ROM, Endurance Training, Balance Training, Self-Care / ADL, Home Management Training, Equipment Evaluation, Education, & procurement, Safety Education & Training                 Goals  Short term goals  Time Frame for Short term goals: 1 week  pt will. ..   Short term goal 1: bathe indep with AD for shower, no device sponge bath  Short term goal 2: dress indep  Short term goal 3: toilet indep with AD  Short term goal 4: transfer with AD as needed  Short term goal 5: functional mobility and simple meal prep indep with AD as needed  Long term goals  Time Frame for Long term goals : same as stg  Patient Goals   Patient goals : \"I want to get out of here in a week\"  \"I dont know, you tell me what I need to do\"  Agreed that he wants to be able to complete self care indep, ambulate indep, drive       Therapy Time   Individual Concurrent Group Co-treatment   Time In 0830         Time Out 1000         Minutes 90         Timed Code Treatment Minutes: [address] [facility], OTR/L 770

## 2019-05-10 NOTE — H&P
cardiology, and permanent pacemaker placement was recommended. He had his permanent pacemaker placed by cardiology on 5/7. Patient was started therapies afterwards, but is weak overall, not safe to return to independent living. He is now admitted to our rehabilitation unit to improve his strength, gait, balance, and safety before discharge. Patient states he has double vision from his myasthenia gravis, and needs to start on prednisone, which he uses to treat this problem at home. We'll start him on 60 mg of prednisone daily at this time. Prior Level of Function:  Independent in self care and ADLs prior to admission. Current Level of Function:  PT:  Transfers  Sit to Stand: Contact guard assistance(With Walker. Stand to sit: Contact guard assistance(With Walker. Comment: Cues for safe hand placement, diminish wgt bear L UE due to recent Pacemaker. No c/o lighthead with EOB/OOB. Ambulation  Ambulation?: Yes  Ambulation 1  Surface: level tile  Device: Rolling Walker  Quality of Gait: Pt amb 50' x 2 with Rolling  Walker CGA. OT:   ADL  Grooming: Setup(combed hair while seated at sink. Declined brushing teeth)  UE Bathing: Setup(seated)  LE Bathing: Minimal assistance(effort to cross LE's to reach feet; very min A for posterior.)  UE Dressing: Setup;Verbal cueing;Stand by assistance(donned pullover shirt. Cues for leaning forward to thread LUE first to avoid lifting UE over head. Pt threaded RUE and pulled over head w/o lifting LUE overhead)  LE Dressing: Minimal assistance(assist to thead briefs. Theaded pants w/o assist. Assist to don footies. Stance with CGA)  Toileting: Unable to assess(comment)(declined need)  Additional Comments: pt sponge bathed seated/standing          has a past medical history of Arthritis, Hyperlipidemia, Hypertension, Myasthenia gravis, and Polycystic kidney. reports that he quit smoking about 69 years ago.  He has never used smokeless tobacco. He reports that he does not drink alcohol or use drugs. family history is not on file. REVIEW OF SYSTEMS:   CONSTITUTIONAL: negative for fevers, chills, diaphoresis, activity change, appetite change, fatigue, night sweats and unexpected weight change. EYES: negative for blurred vision, eye discharge, visual disturbance and icterus. Fracture of left orbit  HEENT: negative for hearing loss, tinnitus, ear drainage, sinus pressure, nasal congestion, epistaxis and snoring. RESPIRATORY: Negative for hemoptysis, cough, sputum production. CARDIOVASCULAR: negative for chest pain, palpitations, exertional chest pressure/discomfort, edema, syncope. + A. fib  GASTROINTESTINAL: negative for nausea, vomiting, diarrhea, constipation, blood in stool and abdominal pain. + GI bleed hx  GENITOURINARY: negative for frequency, dysuria, urinary incontinence, decreased urine volume, and hematuria. + CKD  HEMATOLOGIC/LYMPHATIC: negative for easy bruising, bleeding and lymphadenopathy. ALLERGIC/IMMUNOLOGIC: negative for recurrent infections, angioedema, anaphylaxis and drug reactions. ENDOCRINE: negative for weight changes and diabetic symptoms including polyuria, polydipsia and polyphagia. MUSCULOSKELETAL: negative for pain, joint swelling, decreased range of motion and muscle weakness. NEUROLOGICAL: negative for headaches, slurred speech, unilateral weakness. Myasthenia gravis  PSYCHIATRIC/BEHAVIORAL: negative for hallucinations, behavioral problems, confusion and agitation. All pertinent positives are noted in the HPI.     Physical Examination:  Vitals:   Patient Vitals for the past 24 hrs:   BP Temp Temp src Pulse Resp SpO2 Weight   05/10/19 0521 (!) 165/95 97.5 °F (36.4 °C) Oral 74 16 96 % 181 lb 14.1 oz (82.5 kg)   05/09/19 1815 (!) 151/79 -- -- -- -- -- --   05/09/19 1530 (!) 150/90 -- -- -- -- -- --   05/09/19 1430 (!) 150/94 97.7 °F (36.5 °C) Oral 62 16 96 % --     Psych: Stable mood, normal judgement, normal affect   Const: No distress  Eyes: Conjunctiva noninjected, no icterus noted; pupils equal, round, and reactive to light. Contusion around left eye from left orbit fracture  HENT: Atraumatic, normocephalic; Oral mucosa moist  Neck: Trachea midline, neck supple. No thyromegaly noted. CV: IRRegular rate and rhythm, no murmur rub or gallop noted  Resp: Lungs clear to auscultation bilaterally, no rales wheezes or ronchi, no retractions. Respirations unlabored. GI: Soft, nontender, nondistended. Normal bowel sounds. No palpable masses. Neuro: Alert, oriented, appropriate. No cranial nerve deficits appreciated. Motor examination reveals normal strength in all four limbs diffusely. Skin: Normal temperature and turgor  MSK: No joint abnormalities noted. Ext: No significant edema appreciated. No varicosities. Lab Results   Component Value Date    WBC 2.7 (L) 05/10/2019    HGB 11.6 (L) 05/10/2019    HCT 35.1 (L) 05/10/2019    MCV 86.9 05/10/2019     (L) 05/10/2019     Lab Results   Component Value Date    INR 0.96 09/10/2014    PROTIME 10.3 09/10/2014     Lab Results   Component Value Date    CREATININE 1.8 (H) 05/07/2019    BUN 26 (H) 05/07/2019     05/07/2019    K 4.5 05/07/2019     05/07/2019    CO2 20 (L) 05/07/2019     Lab Results   Component Value Date    ALT 8 (L) 05/07/2019    AST 11 (L) 05/07/2019    ALKPHOS 95 05/07/2019    BILITOT 0.7 05/07/2019       Xr Elbow Left (min 3 Views)    Result Date: 5/7/2019  EXAMINATION: 3 XRAY VIEWS OF THE LEFT ELBOW 5/7/2019 6:24 am COMPARISON: None. HISTORY: ORDERING SYSTEM PROVIDED HISTORY: left elbow injury TECHNOLOGIST PROVIDED HISTORY: Reason for exam:->left elbow injury Ordering Physician Provided Reason for Exam: pt fell, skin broken posterior elbow. IV  present Acuity: Acute Type of Exam: Initial Relevant Medical/Surgical History: arthritis FINDINGS: There is slight prominence of the anterior fat pad without a typical sail sign.   The posterior fat pad is not visible. There is a questionable cortical defect in the radial head, only seen on one view. Spurring and ossicles are identified along the medial joint margin. There is soft tissue swelling medially. Possible nondisplaced radial head fracture. Recommend conservative therapy with repeat radiographs in 8-10 days. Xr Hand Left (min 3 Views)    Result Date: 5/7/2019  EXAMINATION: 3 XRAY VIEWS OF THE LEFT HAND 5/7/2019 6:24 am COMPARISON: None. HISTORY: ORDERING SYSTEM PROVIDED HISTORY: left hand injury TECHNOLOGIST PROVIDED HISTORY: Reason for exam:->left hand injury Ordering Physician Provided Reason for Exam: pt fell, pain in proximal fingers Acuity: Acute Type of Exam: Initial Mechanism of Injury: pt's 5th digit is fixed in flexion following a recent surgery Relevant Medical/Surgical History: arthritis FINDINGS: There is fixed flexion of the little finger. No definite fracture is identified. Joint space alignment is normal.  The soft tissues are unremarkable. No fracture or malalignment. Xr Hand Right (min 3 Views)    Result Date: 5/7/2019  EXAMINATION: 3 XRAY VIEWS OF THE RIGHT HAND 5/7/2019 6:37 am COMPARISON: None. HISTORY: ORDERING SYSTEM PROVIDED HISTORY: right hand injury TECHNOLOGIST PROVIDED HISTORY: Reason for exam:->right hand injury Ordering Physician Provided Reason for Exam: fell, pain in proximal fingers, 1st and 2nd digits, primarily Acuity: Acute Type of Exam: Initial Relevant Medical/Surgical History: arthritis FINDINGS: There is no acute fracture or dislocation. 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 Additional mild degenerative changes are noted at multiple DIP and PIP joints. Subcentimeter well corticated ossicle at the 1st IP joint is likely on the basis of prior trauma. There are no radiopaque foreign bodies. 1. No acute fracture or dislocation. 2. Degenerative changes as described in body of report.      Xr Knee Left (1-2 Views)    Result Date: Small left frontal scalp hematoma noted. 1. No acute intracranial abnormality. 2. Layering dense fluid noted in the left maxillary sinus which is partially imaged, please refer to the dedicated CT of the maxillofacial bones for additional information. Ct Facial Bones Wo Contrast    Result Date: 5/7/2019  EXAMINATION: CT OF THE FACE WITHOUT CONTRAST  5/7/2019 7:20 am TECHNIQUE: CT of the face was performed without the administration of intravenous contrast. Multiplanar reformatted images are provided for review. Dose modulation, iterative reconstruction, and/or weight based adjustment of the mA/kV was utilized to reduce the radiation dose to as low as reasonably achievable. COMPARISON: None HISTORY: ORDERING SYSTEM PROVIDED HISTORY: facial contusion FINDINGS: FACIAL BONES:  The maxilla, pterygoid plates and zygomatic arches are intact. The mandible is intact. The mandibular condyles are normally situated. The nasal bones and maxillary nasal processes are intact. ORBITS:  Globes appear intact. Extraocular muscles are unremarkable. Comminuted fracture involving the inferior wall of the left globe with mild depression of fracture fragments. Tiny foci of gas within the left maxillary sinus. SINUSES/MASTOIDS:  Mild opacification the right maxillary sinus. Near complete opacification left maxillary sinus. SOFT TISSUES:  Soft tissue swelling along the left frontal skull. Left inferior orbital wall fractures above. Near complete opacification left maxillary sinus with tiny foci of gas. Ct Cervical Spine Wo Contrast    Result Date: 5/7/2019  EXAMINATION: CT OF THE CERVICAL SPINE WITHOUT CONTRAST 5/7/2019 7:20 am TECHNIQUE: CT of the cervical spine was performed without the administration of intravenous contrast. Multiplanar reformatted images are provided for review.  Dose modulation, iterative reconstruction, and/or weight based adjustment of the mA/kV was utilized to reduce the radiation dose to as low as reasonably achievable. COMPARISON: CT head and CT of the maxillofacial bones 05/07/2019, CT neck 09/10/2014. HISTORY: ORDERING SYSTEM PROVIDED HISTORY: head injury TECHNOLOGIST PROVIDED HISTORY: If patient is on cardiac monitor and/or pulse ox, they may be taken off cardiac monitor and pulse ox, left on O2 if currently on. All monitors reattached when patient returns to room. Ordering Physician Provided Reason for Exam: fell facial injury Acuity: Acute Type of Exam: Initial FINDINGS: BONES/ALIGNMENT: There is no acute fracture or traumatic listhesis. There are multilevel degenerative changes characterized by varying degrees of disc space narrowing and osteophytosis. There is approximately 2-3 mm of anterolisthesis of C on C3 which is on a degenerative basis. The overall degree of degenerative changes most pronounced from C3-C4 through C6-C7 where there are moderate to severe multilevel degenerative changes. There is near complete bony ankylosis of the C3-C4 level. The dens is intact. There is no predental space widening. The lateral masses of C1 are well aligned on the C2 vertebral body. SOFT TISSUES: There is no prevertebral soft tissue swelling. 1.1 cm hypodense nodule noted in the inferior right thyroid lobe, no recommendation for follow-up as per best practice guidelines. 1 cm hyperdense lesion in the left parotid gland likely represents an intraparotid lymph node. 1. No acute fracture or traumatic listhesis. 2. Advanced multilevel degenerative changes as described in body of report. Xr Chest Portable    Result Date: 5/7/2019  EXAMINATION: SINGLE XRAY VIEW OF THE CHEST 5/7/2019 5:14 pm COMPARISON: Prior studies including most recent 05/07/2013 at 6:23 a.m.  HISTORY: ORDERING SYSTEM PROVIDED HISTORY: Pacemaker placement and rule out pneumothorax TECHNOLOGIST PROVIDED HISTORY: Reason for exam:->Pacemaker placement and rule out pneumothorax Ordering Physician Provided Reason for Exam: Pacemaker placement and rule out pneumothorax Acuity: Acute Type of Exam: Initial FINDINGS: A left subclavian cardiac device is noted. The patient is rotated toward the right. The heart size is stable. The lungs are clear. No pneumothorax is evident. Elevation of right hemidiaphragm is unchanged. Interval placement of left subclavian cardiac device. No pneumothorax. Xr Chest Portable    Result Date: 5/7/2019  EXAMINATION: SINGLE XRAY VIEW OF THE CHEST 5/7/2019 5:59 am COMPARISON: 05/01/2018 HISTORY: ORDERING SYSTEM PROVIDED HISTORY: fall TECHNOLOGIST PROVIDED HISTORY: Reason for exam:->fall Ordering Physician Provided Reason for Exam: low heartrate, fall FINDINGS: Elevated right hemidiaphragm is again seen with some adjacent atelectasis. The lungs are otherwise clear. The heart size is at the upper limits of normal.  There is no discernible pneumothorax. No acute disease. The above labs and diagnostic studies have been reviewed by myself upon admission to inpatient rehabilitation. Barriers to Discharge: Patient  has a past medical history of Arthritis, Hyperlipidemia, Hypertension, Myasthenia gravis, and Polycystic kidney. Pt lives alone in [facility]. POST ADMISSION PHYSICIAN EVALUATION  The patient has agreed to being admitted to our comprehensive inpatient  rehabilitation facility consisting of at least 180 minutes of therapy a day,  5 out of 7 days a week. The patient/family has a good understanding of our discharge process. The  patient has potential to make improvement and is in need of at least two of  the following multidisciplinary therapies including but not limited to  physical, occupational, respiratory, and speech, nutritional services, wound care, and prosthetics and orthotics.  Given the patients complex condition  and risk of further medical complications, rehabilitation services cannot be  safely provided at a lower level of care such as a skilled nursing facility. I have compared the patients medical and functional status at the time of the  preadmission screening and the same on this date, and there are no significant changes. By signing this document, I acknowledge that I have personally performed a  full physical examination on this patient within 24 hours of admission to  this inpatient rehabilitation facility and have determined the patient to be  able to tolerate the above course of treatment at an intensive level for a  reasonable period of time. I will be completing a detailed individualized  Plan of Care for this patient by day four of the patients stay based upon the  Preadmission Screen, this Post-Admission Evaluation, and the therapy  evaluations. Assessment and Plan:     Cardiogenic syncope,  Atrial fibrillation with slow ventricular response- Permanent pacemaker placement     Closed fracture of the left inferior orbital ridge- Monitor     Ocular myasthenia gravis- Prednisone     History of GI bleed- Protonix     Stage III chronic kidney disease- monitor     Hypertension - Toprol, Cardura     Hyperlipidemia- Lipitor    Bowels: Schedule Miralax + Senna S. Follow bowel movements. Enema or suppository if needed. Bladder: Check PVR x 3. [facility] if PVR > 200ml or if any volume is > 500 ml. Pain: Tylenol is ordered prn.        Sammi Angel MD, 5/10/2019, 7:23 AM

## 2019-05-10 NOTE — PROGRESS NOTES
[employer] text message in to Dr. Joselito Lassiter with results of EKG. Awaiting return communication verification.  Electronically signed by Juanis Mosley RN on 5/10/2019 at 12:25 PM

## 2019-05-10 NOTE — PROGRESS NOTES
IM Progress Note          CC: Syncope      Interval history:  Luis Miguel Montalvo is doing ok. No dizziness nor syncope. No chest pain nor sob. He is now on high dose Prednisone for his occular Myasthenia Gravis and notes some improvement in his diplopia. Review of Systems:  Review of Systems   Constitutional: Positive for activity change. Negative for appetite change. Eyes:        Double vision with left gaze. Respiratory: Negative. Cardiovascular: Negative. Gastrointestinal: Negative. Musculoskeletal: Positive for arthralgias. Neurological: Negative for dizziness, syncope and facial asymmetry. 14 point ros otherwise negative. Objective:    BP (!) 165/95   Pulse 74   Temp 97.5 °F (36.4 °C) (Oral)   Resp 16   Ht 5' 6.93\" (1.7 m)   Wt 181 lb 14.1 oz (82.5 kg)   SpO2 96%   BMI 28.55 kg/m²     Intake/Output Summary (Last 24 hours) at 5/10/2019 0858  Last data filed at 5/9/2019 1821  Gross per 24 hour   Intake 240 ml   Output --   Net 240 ml        Physical Exam   Constitutional: He is oriented to person, place, and time. He appears well-developed and well-nourished. No distress. Eyes:   Left lateral rectus weakness. Left suborbital ecchymosis. Neck: No JVD present. Cardiovascular:   Rhythm is rather rapid and irregular this morning. Pulmonary/Chest: Effort normal and breath sounds normal.   Musculoskeletal: He exhibits no edema. Neurological: He is alert and oriented to person, place, and time. Skin: Skin is warm and dry. CBC:   Recent Labs     05/09/19  0440 05/10/19  0632   WBC 4.1 2.7*   HGB 10.8* 11.6*   * 133*     BMP:    Recent Labs     05/10/19  0632      K 5.0      CO2 24   BUN 25*   CREATININE 1.4*   GLUCOSE 186*     Hepatic: No results for input(s): AST, ALT, ALB, BILITOT, ALKPHOS in the last 72 hours. Troponin: No results for input(s): TROPONINI in the last 72 hours. BNP: No results for input(s): BNP in the last 72 hours.   Lipids: No results for input(s): CHOL, HDL in the last 72 hours. Invalid input(s): LDLCALCU  INR: No results for input(s): INR in the last 72 hours. Glucose:    Recent Labs     05/10/19  0838   GLUCOSE 186*            Assessment/Plan:     Syncope, cardiogenic [R55] - stable post PM; rate a bit rapid;  will check EKG and possibly increase Beta blocker. 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 Priority: Low    S/P placement of cardiac pacemaker [Z95.0]    Hypertension [I10] - continue Toprol.        Time spent: > 35 minutes                    Electronically signed by Neymar Norman MD on 5/10/2019 at 8:58 AM

## 2019-05-10 NOTE — PROGRESS NOTES
4 Eyes Skin Assessment     The patient is being assess for  Admission    I agree that 2 RN's have performed a thorough Head to Toe Skin Assessment on the patient. ALL assessment sites listed below have been assessed. Areas assessed by both nurses:   [x]   Head, Face, and Ears   [x]   Shoulders, Back, and Chest  [x]   Arms, Elbows, and Hands   [x]   Coccyx, Sacrum, and IschIum  [x]   Legs, Feet, and Heels    Facial and scattered bruising, dressing over new pacemaker area      Does the Patient have Skin Breakdown?   No         Derek Prevention initiated:  NA   Wound Care Orders initiated:  NA      [facility] nurse consulted for Pressure Injury (Stage 3,4, Unstageable, DTI, NWPT, and Complex wounds), New and Established Ostomies:  NA      Nurse 1 eSignature: Electronically signed by Saturnion Collins RN on 5/10/19 at 3:07 AM    **SHARE this note so that the co-signing nurse is able to place an eSignature**    Nurse 2 eSignature: Electronically signed by Shelbie Hinton RN on 5/10/19 at 3:08 AM

## 2019-05-10 NOTE — PROGRESS NOTES
Patient is alert/oriented. Here due to a fall at home. Had pacemaker placed on 5/7. Dressing clean, dry, intact. Scattered bruising. Pleasant. Is [place]; hearing aides are at home. He calls appropriately. Continent. Transfers using walker. On room air. Will continue to monitor.

## 2019-05-10 NOTE — PLAN OF CARE
ARU PATIENT TREATMENT PLAN  [facility]   [address]  [place] [address]  [phone]    Manny Cardona    : 12/15/1929  Acct #: [de-identified]  MRN: [id number]   PHYSICIAN:  More Urena MD    Rehabilitation Diagnosis:     Cardiogenic syncope,  Atrial fibrillation with slow ventricular response- Permanent pacemaker placement      Closed fracture of the left inferior orbital ridge- Monitor      Ocular myasthenia gravis- Prednisone      History of GI bleed- Protonix      Stage III chronic kidney disease- monitor      Hypertension - Toprol, Cardura      Hyperlipidemia- Lipitor     Bowels: Schedule Miralax + Senna S. Follow bowel movements. Enema or suppository if needed.      Bladder: Check PVR x 3. [address] if PVR > 200ml or if any volume is > 500 ml.      Pain: Tylenol is ordered prn. ADMIT DATE:2019    Patient Goals: To return home to independent living at [facility]    Admitting Impairments: Decreased functional mobility ; Decreased endurance;Decreased ADL status; Decreased safe awareness;Decreased balance;Decreased vision/visual deficit; Decreased high-level IADLs    Barriers: PPM precautions, decreased strength, decreased endurance, medical co morbidities     Participation: prior to admission patient lived in independent living at [facility]; patient independent with occasional use of walker, drives, builds lego models        CARE PLAN     NURSING:  Manny Cardona while on this unit will:     [x] Be continent of bowel and bladder     [x] Have an adequate number of bowel movements  [x] Urinate with no urinary retention >300ml in bladder  [] Complete bladder protocol with wilde removal  [x] Maintain O2 SATs at 92%  [x] Have pain managed while on ARU       [] Be pain free by discharge   [x] Have no skin breakdown while on ARU  [] Have improved skin integrity via wound measurements  [x] Have no signs/symptoms of infection at the [facility] site  [x] Be free from injury during hospitalization   [x] Complete education with patient/family with understanding demonstrated for:  [x] Adjustment   [x] Other: pacemaker precautions, diabetic needs    Nursing interventions may include bowel/bladder training, education for medical assistive devices, medication education, O2 saturation management, energy conservation, stress management techniques, fall prevention, alarms protocol, seating and positioning, skin/wound care, pressure relief instruction,dressing changes,  infection protection, DVT prophylaxis, and/or assistance with in room safety with transfers to bed, toilet, wheelchair, shower as well as bathroom activities and hygiene. Patient/caregiver education for:   [x] Disease/sustained injury/management      [x] Medication Use   [x] Surgical intervention   [x] Safety   [x] Body mechanics and or joint protection   [x] Health maintenance         PHYSICAL THERAPY:  Goals:                  Short term goals  Time Frame for Short term goals: 5-7 days  Short term goal 1: Bed Mob Independent. Short term goal 2: Transfers with assist device  modified Independent. Short term goal 3: Amb with assist device 200' modified Independent. Short term goal 4: ascend /descend curb step with wheeled walker with supervision  Short term goal 5: ascend/descend 12 steps with B rails with SBA            Long term goals  Time Frame for Long term goals : STG=LTG  These goals were reviewed with this patient at the time of assessment and Amber Tay is in agreement. Plan of Care: Pt to be seen 90   mins per day for 5-6 day/week 5-7 days.                    Current Treatment Recommendations: Balance Training, Functional Mobility Training, Transfer Training, Gait Training, Safety Education & Training, Patient/Caregiver Education & Training, ADL/Self-care Training, Endurance Training, Stair training, Home Exercise Program, Equipment Evaluation, Education, & procurement, Strengthening      OCCUPATIONAL THERAPY:  Goals:             Short term goals  Time Frame for Short term goals: 1 week  pt will. .. Short term goal 1: bathe indep with AD for shower, no device sponge bath  Short term goal 2: dress indep  Short term goal 3: toilet indep with AD  Short term goal 4: transfer with AD as needed  Short term goal 5: functional mobility and simple meal prep indep with AD as needed :  Long term goals  Time Frame for Long term goals : same as stg : These goals were reviewed with this patient at the time of assessment and Kandace Luu is in agreement    Plan of Care:  Pt to be seen 90   mins per day for 5-6 day/week 1 week. Patient Education: role of OT, goals, PPM precautions      SPEECH THERAPY: Goals will be left blank if speech is not following this patient. Goals:                            CASE MANAGEMENT:  Goals:   Assist patient/family with discharge planning, patient/family counseling,   and coordination with insurance during ARU stay. Admission Period/Goal FIM SCORES  FIM Admit/Goal Score   Eating/Swallowing 5/7   Grooming 5/7   Bathing 5/6   Upper Body Dressing 5/7   Lower Body Dressing 4/7   Toileting 4/6   Bladder William, Accidents = FIM 6/6   Bowel  William, Accidents = FIM 6/6   Bed/Chair Transfer 4/6   Toilet Transfer 4/6   Tub/Shower Transfer  0/6   Locomotion:  Ambulation (Walk) / Wheelchair:  W = walk , w/c = wheelchair  Distance:   1= 0-49 ft , 2=  ft, 3= 150+ ft Distance: 3   Level of Assist: 4   Mode: w   FIM: 4/6      Stairs 2/5   Comprehension 5/7   Expression 5/7   Social Interaction 5/7   Problem Solving 4/7   Memory 4/7        Kandace Luu will be seen a minimum of 3 hours of therapy per day, a minimum of 5 out of 7 days per week. [] In this rare instance due to the nature of this patient's medical involvement, this patient will be seen 15 hours per week (900 minutes within a 7 day period).     Treatments may include therapeutic exercises, gait training, neuromuscular re-ed, transfer training, community reintegration, bed mobility,  self care, home mgmt, cognitive training, energy conservation,, and patient/family education. In addition, dietician/nutritionist may monitor calorie count as well as intake and collaboratively work with SLP on dietary upgrades. Neuropsychology/Psychology may evaluate and provide necessary support. Medical issues being managed closely and that require 24 hour availability of a physician:   [] Swallowing Precautions  [] Bowel/Bladder Fx  [] Weight bearing precautions   [x] Wound Care    [x] Pain Mgmt   [x] Infection Protection   [x] DVT Prophylaxis   [x] Fall Precautions  [x] Fluid/Electrolyte/Nutrition Balance   [] Voice Protection   [] Respiratory  [] Other:    Medical Prognosis: [] Good  [x] Fair    [] Guarded   Total expected IRF days 5-7  Anticipated discharge destination:    [] Home Independently   [x] Home Modified Independent  [] Home with supervision    []SNF     [] Other                                           Physician anticipated functional outcomes:  Improve gait, transfers, self care to modified independent to allow safe return home to [facility]      IPOC brief synthesis: 80year-old white male with a history of atrial fibrillation, previous GI bleeding, and myasthenia gravis. The patient came to the hospital with dizziness, syncopal episode  And striking his head and face when he fell down, and found to have significant bradycardia with a pulse in the 20s.  CT scanning of his facial bones revealed an inferior orbital fracture as well as a possible left radial head fracture. The patient does have significant ecchymosis about the left eye. ppatient was seen by orthopedics and left radial head  Fracture was ruled out. Patient was seen by cardiology, and permanent pacemaker placement was recommended.  He had his permanent pacemaker placed by cardiology on 5/7. Kendell Moreno was started therapies afterwards, but is weak overall, not safe to return to independent living. Starr Delgado is now admitted to our rehabilitation unit to improve his strength, gait, balance, and safety before discharge. Patient states he has double vision from his myasthenia gravis, and needs to start on prednisone, which he uses to treat this problem at home. We'll start him on 60 mg of prednisone daily at this time.       I have reviewed this initial plan of care and agree with its contents:    Title   Name    Date    Time    Physician:  Dr. Ammon Desai, 5/12/19 9 AM    Case Mgmt:   SAPNA Burden     Case Management   [phone]    5/10/2019  4:24 PM      OT: SAWYER Nagy/L  #[id number] 5/10/19 10:39 AM      PT:Electronically signed by Sienna Childers PT 0050 on 5/10/2019 at 4:25 PM      RN: Virgilio Cope RN, [address] [place] [address] 05/12/2019 12:45 pm    ST:    :  TRINIDAD Rolon 5/10/2019  [id number]    Other:

## 2019-05-10 NOTE — PLAN OF CARE
Problem: Pain:  Goal: Control of acute pain  Description  Control of acute pain  Outcome: Ongoing   Pain assessed using 0-10 scale. Offer PRN medication as ordered by MD. Neil King 30 minutes after giving. Problem: Falls - Risk of:  Goal: Will remain free from falls  Description  Will remain free from falls  Outcome: Ongoing   Patient remained free of any falls this shift. Call light in reach at all times. Non skid footwear on. Patient encouraged to call for help when needed. Room free of clutter. Alarms on.

## 2019-05-10 NOTE — CARE COORDINATION
LSW reviewed chart. LSW met with patient to introduce  role, initiate discussion regarding DC planning and to inform of weekly review of progress on . He prefers to be called Melinda Castle. SOCIAL WORK ASSESSMENT      GOAL:   To return home to [place] at [address]:   Patient lives alone, [place] Living residence at [facility] and has only been there for a few months. His wife  in 2018 while they were living at [facility]. After her death, he decided to move. He had a fall in the apt at [facility]. He is active with Dr Maranda Woods for PCP needs. He was totally independent prior to admit with all personal care needs, driving. He uses either a SPC or wh walker at his apt. PRIOR LEVEL OF FUNCTIONING:       PERSONAL CARE:    Totally independnt                                                                       DRIVES:  yes                                                                     FINANCES:  Totally independent                                                                   MEALS:  Makes his own lunch and breakfast; goes to [employer] for dinner meal                               GROCERY SHOPS:      DME CURRENTLY AT HOME:wh walker, cane      CURRENT HOME CARE/SERVICES:  LSW provided information regarding possible post acute services such as home care or outpatient services. LSW provided a list of agencies for home care. He states he could attend Outpatient services at [facility]. PREFERRED HOME CARE:   To be determined. TEAM CONFERENCE DAY:    . LSW informed him of weekly review of progress during TEam Conference on . Team will review progress, DME recommendations and DC date. This worker will return to give updates to him and plan for DC.    ADIELW informed patient of after visit phone calls from [address] on days 2, 7, and 60 for assessment of needs.     ADIELW informed patient of recommendation for PCP visit within 7 days post discharge. LSW informed patient of preferred  time on date of discharge which is between 10 - 12 noon.     Jacqueline Medrano [place]     Case Management   [phone]    5/10/2019  1:04 PM

## 2019-05-11 LAB
BASOPHILS ABSOLUTE: 0 K/UL (ref 0–0.2)
BASOPHILS RELATIVE PERCENT: 0 %
EOSINOPHILS ABSOLUTE: 0 K/UL (ref 0–0.6)
EOSINOPHILS RELATIVE PERCENT: 0 %
GLUCOSE BLD-MCNC: 131 MG/DL (ref 70–99)
GLUCOSE BLD-MCNC: 142 MG/DL (ref 70–99)
GLUCOSE BLD-MCNC: 190 MG/DL (ref 70–99)
GLUCOSE BLD-MCNC: 203 MG/DL (ref 70–99)
HCT VFR BLD CALC: 34.9 % (ref 40.5–52.5)
HEMOGLOBIN: 11.5 G/DL (ref 13.5–17.5)
LYMPHOCYTES ABSOLUTE: 0.9 K/UL (ref 1–5.1)
LYMPHOCYTES RELATIVE PERCENT: 15.4 %
MCH RBC QN AUTO: 28.7 PG (ref 26–34)
MCHC RBC AUTO-ENTMCNC: 32.9 G/DL (ref 31–36)
MCV RBC AUTO: 87.5 FL (ref 80–100)
MONOCYTES ABSOLUTE: 0.5 K/UL (ref 0–1.3)
MONOCYTES RELATIVE PERCENT: 9.1 %
NEUTROPHILS ABSOLUTE: 4.2 K/UL (ref 1.7–7.7)
NEUTROPHILS RELATIVE PERCENT: 75.5 %
PDW BLD-RTO: 15.8 % (ref 12.4–15.4)
PERFORMED ON: ABNORMAL
PLATELET # BLD: 126 K/UL (ref 135–450)
PMV BLD AUTO: 8.8 FL (ref 5–10.5)
RBC # BLD: 4 M/UL (ref 4.2–5.9)
WBC # BLD: 5.5 K/UL (ref 4–11)

## 2019-05-11 PROCEDURE — 97530 THERAPEUTIC ACTIVITIES: CPT

## 2019-05-11 PROCEDURE — 36415 COLL VENOUS BLD VENIPUNCTURE: CPT

## 2019-05-11 PROCEDURE — 6360000002 HC RX W HCPCS: Performed by: PHYSICAL MEDICINE & REHABILITATION

## 2019-05-11 PROCEDURE — 97116 GAIT TRAINING THERAPY: CPT

## 2019-05-11 PROCEDURE — 1280000000 HC REHAB R&B

## 2019-05-11 PROCEDURE — 97110 THERAPEUTIC EXERCISES: CPT

## 2019-05-11 PROCEDURE — 6370000000 HC RX 637 (ALT 250 FOR IP): Performed by: PHYSICAL MEDICINE & REHABILITATION

## 2019-05-11 PROCEDURE — 85025 COMPLETE CBC W/AUTO DIFF WBC: CPT

## 2019-05-11 PROCEDURE — 99231 SBSQ HOSP IP/OBS SF/LOW 25: CPT | Performed by: INTERNAL MEDICINE

## 2019-05-11 RX ADMIN — PREDNISONE 60 MG: 20 TABLET ORAL at 10:46

## 2019-05-11 RX ADMIN — DOXAZOSIN 4 MG: 4 TABLET ORAL at 21:40

## 2019-05-11 RX ADMIN — INSULIN LISPRO 1 UNITS: 100 INJECTION, SOLUTION INTRAVENOUS; SUBCUTANEOUS at 18:06

## 2019-05-11 RX ADMIN — ENOXAPARIN SODIUM 30 MG: 30 INJECTION SUBCUTANEOUS at 10:49

## 2019-05-11 RX ADMIN — METOPROLOL SUCCINATE 25 MG: 25 TABLET, EXTENDED RELEASE ORAL at 10:47

## 2019-05-11 RX ADMIN — PANTOPRAZOLE SODIUM 40 MG: 40 TABLET, DELAYED RELEASE ORAL at 06:02

## 2019-05-11 RX ADMIN — ATORVASTATIN CALCIUM 10 MG: 10 TABLET, FILM COATED ORAL at 10:47

## 2019-05-11 RX ADMIN — INSULIN LISPRO 1 UNITS: 100 INJECTION, SOLUTION INTRAVENOUS; SUBCUTANEOUS at 21:41

## 2019-05-11 ASSESSMENT — PAIN DESCRIPTION - PAIN TYPE: TYPE: ACUTE PAIN

## 2019-05-11 ASSESSMENT — PAIN DESCRIPTION - PROGRESSION
CLINICAL_PROGRESSION: NOT CHANGED

## 2019-05-11 ASSESSMENT — PAIN DESCRIPTION - DESCRIPTORS: DESCRIPTORS: ACHING

## 2019-05-11 ASSESSMENT — PAIN SCALES - GENERAL
PAINLEVEL_OUTOF10: 1
PAINLEVEL_OUTOF10: 0
PAINLEVEL_OUTOF10: 1
PAINLEVEL_OUTOF10: 1

## 2019-05-11 ASSESSMENT — PAIN DESCRIPTION - ONSET: ONSET: ON-GOING

## 2019-05-11 ASSESSMENT — PAIN - FUNCTIONAL ASSESSMENT: PAIN_FUNCTIONAL_ASSESSMENT: PREVENTS OR INTERFERES SOME ACTIVE ACTIVITIES AND ADLS

## 2019-05-11 ASSESSMENT — PAIN DESCRIPTION - LOCATION: LOCATION: HAND

## 2019-05-11 ASSESSMENT — PAIN DESCRIPTION - FREQUENCY: FREQUENCY: CONTINUOUS

## 2019-05-11 ASSESSMENT — PAIN DESCRIPTION - ORIENTATION: ORIENTATION: RIGHT;LEFT

## 2019-05-11 NOTE — PLAN OF CARE
Problem: Pain:  Goal: Pain level will decrease  Description  Pain level will decrease  5/11/2019 0301 by Kelvin Riley RN  Outcome: Ongoing   Pain assessed using 0-10 scale. Offer PRN medication as ordered by MD.  Lenny Ache 30 minutes after giving. Problem: Falls - Risk of:  Goal: Will remain free from falls  Description  Will remain free from falls  5/11/2019 0301 by Kelvin Riley RN  Outcome: Ongoing   Patient remained free of any falls this shift. Call light in reach at all times. Non skid footwear on. Patient encouraged to call for help when needed. Room free of clutter. Alarms on. Problem: Risk for Impaired Skin Integrity  Goal: Tissue integrity - skin and mucous membranes  Description  Structural intactness and normal physiological function of skin and  mucous membranes. 5/11/2019 0301 by Kelvin Riley RN  Outcome: Ongoing   Patient is able to shift weight without assistance. Reposition every two hours. Skin assessed every shift. No new area of breakdown. Skin warm and dry to touch. Waffle cushion in chair.

## 2019-05-11 NOTE — PROGRESS NOTES
[facility] Internal Medicine Note      Chief Complaint: I am doing well    Subjective/Interval History:    The patient is doing well. Sleeping in the chair upon my arrival, but awakes easily. He states he is eating and drinking well. He has minimal pain. He feels therapy is going well. No chest pain or shortness breath. No cough or sputum. No nausea, vomiting, diarrhea. No abdominal pain. No dysuria. The remainder of the review of systems is negative. Medication list reviewed    Objective:    /74   Pulse 76   Temp 97.6 °F (36.4 °C) (Oral)   Resp 16   Ht 5' 6.93\" (1.7 m)   Wt 177 lb 7.5 oz (80.5 kg)   SpO2 97%   BMI 27.85 kg/m²   Temp  Av.8 °F (36.6 °C)  Min: 97.6 °F (36.4 °C)  Max: 98 °F (36.7 °C)    RRR  Chest- Clear bilaterally. Respirations easy. Left upper chest incision site/dressing CDI.   Ext - no edema    The Following Labs Were Reviewed Today:    Recent Results (from the past 24 hour(s))   POCT Glucose    Collection Time: 05/10/19  4:56 PM   Result Value Ref Range    POC Glucose 184 (H) 70 - 99 mg/dl    Performed on ACCU-CHEK    POCT Glucose    Collection Time: 05/10/19  8:10 PM   Result Value Ref Range    POC Glucose 261 (H) 70 - 99 mg/dl    Performed on ACCU-CHEK    POCT Glucose    Collection Time: 19  7:40 AM   Result Value Ref Range    POC Glucose 142 (H) 70 - 99 mg/dl    Performed on ACCU-CHEK    CBC Auto Differential    Collection Time: 19  7:58 AM   Result Value Ref Range    WBC 5.5 4.0 - 11.0 K/uL    RBC 4.00 (L) 4.20 - 5.90 M/uL    Hemoglobin 11.5 (L) 13.5 - 17.5 g/dL    Hematocrit 34.9 (L) 40.5 - 52.5 %    MCV 87.5 80.0 - 100.0 fL    MCH 28.7 26.0 - 34.0 pg    MCHC 32.9 31.0 - 36.0 g/dL    RDW 15.8 (H) 12.4 - 15.4 %    Platelets 097 (L) 149 - 450 K/uL    MPV 8.8 5.0 - 10.5 fL    Neutrophils % 75.5 %    Lymphocytes % 15.4 %    Monocytes % 9.1 %    Eosinophils % 0.0 %    Basophils % 0.0 %    Neutrophils # 4.2 1.7 - 7.7 K/uL    Lymphocytes # 0.9 (L) 1.0 - 5.1 K/uL    Monocytes # 0.5 0.0 - 1.3 K/uL    Eosinophils # 0.0 0.0 - 0.6 K/uL    Basophils # 0.0 0.0 - 0.2 K/uL   POCT Glucose    Collection Time: 05/11/19 12:08 PM   Result Value Ref Range    POC Glucose 131 (H) 70 - 99 mg/dl    Performed on ACCU-CHEK        ASSESSMENT/PLAN:      Principal Problem:    Syncope, cardiogenic-well on rehabilitation. Stable since pacemaker placed  Active Problems:    Myasthenia gravis ([facility] [address] -clinically seems stable. Continue to follow    Hypertension-the blood pressure seems somewhat labile. May need to adjust medications. Neutropenia (HCC)-improved. White blood cell count normal today.  Continue to monitor      Emelina Andrea MD, FACP  1:48 PM  5/11/2019

## 2019-05-11 NOTE — PROGRESS NOTES
Assessment complete and medications given without complication. Alert/oriented. Pacemaker dressing clean, dry, intact. Scattered bruising. Transfers using walker, does well. Had BM this shift. Pleasant. Calls appropriately. Denies pain. Slept well overnight. Will continue to monitor.

## 2019-05-11 NOTE — PROGRESS NOTES
Occupational Therapy  Facility/Department: [address] IP REHAB  Daily Treatment Note  NAME: Zora Hill  : 12/15/1929  MRN: [id number]    Date of Service: 2019    Discharge Recommendations:  Home with assist PRN     Assessment   Performance deficits / Impairments: Decreased functional mobility ; Decreased endurance;Decreased ADL status; Decreased safe awareness;Decreased balance;Decreased vision/visual deficit; Decreased high-level IADLs  Assessment: Pt tolerated tx well this date. He was already dressed on OT arrival and politely declined to complete any ADL tasks. Pt completed fxl transfers and mobility with SBA, bed mobility with SBA. Pt completed simple homemaking task in kitchen using RW with SBA, occasional verbal cues for safe technqiue. Pt tolerated UB therex to increase fxl activity tolerance. Cont per OT POC. Treatment Diagnosis: decreased ADl, balance, IADL,   REQUIRES OT FOLLOW UP: Yes  Activity Tolerance  Activity Tolerance: Patient Tolerated treatment well  Safety Devices  Safety Devices in place: Yes  Type of devices: Left in chair;Call light within reach; Chair alarm in place;Nurse notified       Patient Diagnosis(es): There were no encounter diagnoses. has a past medical history of Arthritis, Hyperlipidemia, Hypertension, Myasthenia gravis, and Polycystic kidney. has a past surgical history that includes joint replacement; back surgery; Tonsillectomy; Lithotripsy; orthopedic surgery; Cholecystectomy; other surgical history; Cataract removal with implant (Right, 13); Cataract removal with implant (Left, 3/22/13); Hand surgery; Small intestine surgery (13); Colon surgery; fracture surgery; Upper gastrointestinal endoscopy (9/10/14); Upper gastrointestinal endoscopy (14); and Upper gastrointestinal endoscopy (2018).     Restrictions  Restrictions/Precautions  Restrictions/Precautions: Weight Bearing  Implants present? : Pacemaker(19)  Upper Extremity Weight Bearing Restrictions  Left Upper Extremity Weight Bearing: Weight Bearing As Tolerated  Other: Ortho (Dr. Zeina Fallon at bedside 5/8/19) -consulted for possible L Radial Head Fx :  \"I do not think the patient has a Radial Head Fx\", no wgt bear or activity restrictions. Position Activity Restriction  Other position/activity restrictions: New Pacemaker placed 5/7/19 - L UE ROM Restrictions (Do not elevate affected arm above shoulder for 30 days. No showering for one week)     Subjective   General  Chart Reviewed: Yes  Additional Pertinent Hx: This patient is an 80-year-old white male with a history of atrial fibrillation, previous GI bleeding, and myasthenia gravis. The patient came to the hospital with dizziness, syncopal episode  And striking his head and face when he fell down, and found to have significant bradycardia with a pulse in the 20s.  CT scanning of his facial bones revealed an inferior orbital fracture as well as a possible left radial head fracture. The patient does have significant ecchymosis about the left eye. ppatient was seen by orthopedics and left radial head  Fracture was ruled out. Patient was seen by cardiology, and permanent pacemaker placement was recommended. He had his permanent pacemaker placed by cardiology on 5/7. Richa Frames was started therapies afterwards, but is weak overall, not safe to return to independent living. [facility] is now admitted to our rehabilitation unit to improve his strength, gait, balance, and safety before discharge. Patient states he has double vision from his myasthenia gravis, and needs to start on prednisone, which he uses to treat this problem at home. We'll start him on 60 mg of prednisone daily at this time. (copied per note 5/10/19)  Response to previous treatment: Patient with no complaints from previous session  Family / Caregiver Present: No  Referring Practitioner: Heis  Diagnosis: debility  Subjective  Subjective: Pt met bedside for OT tx.   Pt was already dressed free weights   Elbow Extension: x15 reps x2 sets using 2lb free weights   Supination: x15 reps x2 sets using 2lb free weights   Pronation: x15 reps x2 sets using 2lb free weights   Wrist Flexion: x15 reps x2 sets using 2lb free weights   Wrist Extension: x15 reps x2 sets using 2lb free weights   Other: B UE arm bike x3 minutes x2 sets - completed in stance      Plan   Plan  Times per week: 5-6 days per week  Times per day: Twice a day  Plan weeks: 1 week pt will. .  Current Treatment Recommendations: Strengthening, Functional Mobility Training, Patient/Caregiver Education & Training, ROM, Endurance Training, Balance Training, Self-Care / ADL, Home Management Training, Equipment Evaluation, Education, & procurement, Safety Education & Training    Goals  Short term goals  Time Frame for Short term goals: 1 week  pt will. ..   Short term goal 1: bathe indep with AD for shower, no device sponge bath  Short term goal 2: dress indep  Short term goal 3: toilet indep with AD  Short term goal 4: transfer with AD as needed  Short term goal 5: functional mobility and simple meal prep indep with AD as needed  Long term goals  Time Frame for Long term goals : same as stg  Patient Goals   Patient goals : \"I want to get out of here in a week\"  \"I dont know, you tell me what I need to do\"  Agreed that he wants to be able to complete self care indep, ambulate indep, drive       Therapy Time   Individual Concurrent Group Co-treatment   Time In 1050         Time Out 1220         Minutes 2250 [address] [place] [address], [address] [place] [address] N

## 2019-05-11 NOTE — PROGRESS NOTES
Physical Therapy  Facility/Department: [address] IP REHAB  Daily Treatment Note  NAME: Jazmín Morales  : 12/15/1929  MRN: [id number]    Date of Service: 2019    Discharge Recommendations:  Continue to assess pending progress, 5-7 sessions per week   PT Equipment Recommendations  Equipment Needed: No  Other: Will monitor for potential equipt needs. Patient Diagnosis(es): There were no encounter diagnoses. has a past medical history of Arthritis, Hyperlipidemia, Hypertension, Myasthenia gravis, and Polycystic kidney. has a past surgical history that includes joint replacement; back surgery; Tonsillectomy; Lithotripsy; orthopedic surgery; Cholecystectomy; other surgical history; Cataract removal with implant (Right, 13); Cataract removal with implant (Left, 3/22/13); Hand surgery; Small intestine surgery (13); Colon surgery; fracture surgery; Upper gastrointestinal endoscopy (9/10/14); Upper gastrointestinal endoscopy (14); and Upper gastrointestinal endoscopy (2018). Restrictions  Restrictions/Precautions  Restrictions/Precautions: Weight Bearing  Implants present? : Pacemaker(19)  Upper Extremity Weight Bearing Restrictions  Left Upper Extremity Weight Bearing: Weight Bearing As Tolerated  Other: Ortho (Dr. Hayden Ruiz at bedside today 19) -consulted for possible L Radial Head Fx :  \"I do not think the patient has a Radial Head Fx\", no wgt bear or activity restrictions. Position Activity Restriction  Other position/activity restrictions: New Pacemaker placed 19 - L UE ROM Restrictions (Do not elevate affected arm above shoulder for 30 days. No showering for one week)  Subjective   General  Chart Reviewed: Yes  Additional Pertinent Hx: 79 y/o male admit 19 with Syncope/Collapse, Bradycardia, CHF and CHI (Fall at Home). CT Head/C-Spine negative. CT Facial Bones - L Inferior Orbital Wall Fx.   X-Ray L Elbow - Possible Nondisplaced Radial Head Fx (per Ortho 19 - \"I do not think the pt has Radial Head Fx.\"). PMH as noted including Myasthenia Gravis, Exp Lap,  Back Surg, B THR, R Elbow/L Hand and B Feet (Club Foot) Surgs, Arthritis. Family / Caregiver Present: No  Referring Practitioner: Dr. Warden Villegas: Patient was agreeavle to PT but was a little aggravated. Pt reports he was told he was not having PT today and that OT would be here early to get him up and dressed. Let pt know that we do not have a schedule on Saturday's and that he would not have PT/OT tomorrow. Pt was still a little aggravated but okay with explanation. Pt has not c/o's of pain at rest or with activity. General Comment  Comments: goes by \"Maninder\"          Orientation  Orientation  Overall Orientation Status: Within Functional Limits  Cognition      Objective   Bed mobility  Rolling to Left: Supervision  Rolling to Right: Supervision  Supine to Sit: Supervision  Sit to Supine: Supervision  Scooting: Supervision  Transfers  Sit to Stand: Contact guard assistance  Stand to sit: Contact guard assistance  Ambulation  Ambulation?: Yes  More Ambulation?: Yes  Ambulation 1  Surface: level tile  Device: Rolling Walker  Assistance: Stand by assistance  Quality of Gait: Slightly flexed posture with decreased clearance in bilateral LE's.   Distance: 160 feet x2  Ambulation 2  Surface - 2: uneven;outdoors  Device 2: Rolling Walker  Assistance 2: Stand by assistance  Quality of Gait 2: Pt was a little more guarded with gait on these surfaces  Distance: 160 feet over varying surfaces  Stairs/Curb  Stairs?: Yes  Stairs  # Steps : 12  Stairs Height: 6\"  Rails: Bilateral  Curbs: 6\"(outside with rolling walker and CGA)  Device: Rolling walker  Assistance: Contact guard assistance     Balance  Sitting - Static: Good  Sitting - Dynamic: Good  Standing - Static: Good  Standing - Dynamic: Fair;+  Other exercises  Other exercises?: Yes  Other exercises 1: While standing on [employer], pt balanced and used RUE to move controller to perform simulated tennis activities on the Wii, this was done to work RUE ROM/strength, core/LE strength, balance, hand-eye coordination, and reaction timing. Assessment   Body structures, Functions, Activity limitations: Decreased functional mobility ; Decreased ADL status; Decreased endurance;Decreased balance;Decreased vision/visual deficit; Increased Pain;Decreased safe awareness;Decreased strength  Assessment: Patient is an 79 y/o with S/P pacemaker on 5/7. Patient initially admitted to acute acare on 5/7/19 with Syncope/Collapse, Bradycardia, CHF and CHI (Fall at [facility]). CT Head/C-Spine negative. CT Facial Bones - L Inferior Orbital Wall Fx. X-Ray L Elbow - Possible Nondisplaced Radial Head Fx (per Ortho 5/8/19 - \"I do not think the pt has Radial Head Fx.\"). PMH as noted including Myasthenia Gravis, Exp Lap,  Back Surg, B THR, R Elbow/L Hand and B Feet (Club Foot) Surgs, Arthritis. PTA pt resides independent living (moved there following death of spouse approx 5 mos ago). Fall prior to admit due to Bradycardia with several injuries as noted. Patient presently, CGA for transfers with pt remembering hand placement and to limit use of LUE, ambulated with wheeled walker with SBA and performs bed mobility with Supervision with cues for pacemaker precautions. Patient would benefit from inpatient rehab to improve overall independence with mobility withor without device to return home to independent facility. Prognosis: Good  Patient Education: rehab expectations, goals, pacemaker precautions  Barriers to Learning: slightly impulsive  REQUIRES PT FOLLOW UP: Yes  Activity Tolerance  Activity Tolerance: Patient Tolerated treatment well;Patient limited by endurance                                                                 Goals  Short term goals  Time Frame for Short term goals: 5-7 days  Short term goal 1: Bed Mob Independent.    Short term goal 2: Transfers with assist device  modified Independent. Short term goal 3: Amb with assist device 200' modified Independent. Short term goal 4: ascend /descend curb step with wheeled walker with supervision  Short term goal 5: ascend/descend 12 steps with B rails with SBA  Long term goals  Time Frame for Long term goals : STG=LTG  Patient Goals   Patient goals : Return to Independent living and back to my regular life    Plan    Plan  Times per week: 5-6x week  Times per day: Twice a day  Plan weeks: 1 week  Current Treatment Recommendations: Balance Training, Functional Mobility Training, Transfer Training, Gait Training, Safety Education & Training, Patient/Caregiver Education & Training, ADL/Self-care Training, Endurance Training, Stair training, Home Exercise Program, Equipment Evaluation, Education, & procurement, Strengthening  Safety Devices  Type of devices: All fall risk precautions in place, Gait belt, Patient at risk for falls, Call light within reach, Chair alarm in place, Left in chair  Restraints  Initially in place: No     Therapy Time   Individual Concurrent Group Co-treatment   Time In [address] 90         Timed Code Treatment Minutes: 90 Minutes     If patient d/c prior to next session this note will serve as d/c summary.  Care will be turned over to primary therapist.  Tab Mak, PT

## 2019-05-11 NOTE — PROGRESS NOTES
Patient admitted s/p Pacemaker placement-Patient oriented to person, place, time. Vital signs stable. Patient remains on room air only. Patients currently experiencing a level 1 pain to hands bilat. Shift assessment completed. Medication administration completed without any complications. Patient is up with activity times 2 with a walker. Requiring Minimal contact assistance. Patients tolerating Low Na+ 2 GM diet. Remains continent of bowel and bladder. No needs voiced at this time. Call light within reach. Will continue to monitor. Patient has been in therapy most of the morning.

## 2019-05-11 NOTE — PLAN OF CARE
Problem: Pain:  Goal: Pain level will decrease  Description  Pain level will decrease  5/11/2019 1430 by Aron Scruggs RN  Outcome: Ongoing  Note:   Patient able to express pain and rate pain using pain scale. Medicate as needed per orders. Reassess patient pain level within one hour after oral pain medication/intervention and 30 minutes after IV pain medication to assure patient has reduced pain sensation and document outcome. Non pharmaceutical interventions as appropriate. Problem: Falls - Risk of:  Goal: Will remain free from falls  Description  Will remain free from falls  5/11/2019 1430 by Aron Scruggs RN  Outcome: Ongoing  Note:   Yellow FALL RISK band on patient. Orange light on outside of room. Non skid footwear in place. Alarms used appropriately. Patient instructed to call and wait for staff before getting up. Rounding done to anticipate needs. Appropriate safety devices used for transfers        Problem: Risk for Impaired Skin Integrity  Goal: Tissue integrity - skin and mucous membranes  Description  Structural intactness and normal physiological function of skin and  mucous membranes. 5/11/2019 1430 by Aron Scruggs RN  Outcome: Ongoing  Note:   Skin assessment performed each shift per protocol. Skin care protocol in place.

## 2019-05-12 LAB
GLUCOSE BLD-MCNC: 171 MG/DL (ref 70–99)
GLUCOSE BLD-MCNC: 223 MG/DL (ref 70–99)
GLUCOSE BLD-MCNC: 224 MG/DL (ref 70–99)
GLUCOSE BLD-MCNC: 232 MG/DL (ref 70–99)
PERFORMED ON: ABNORMAL

## 2019-05-12 PROCEDURE — 6370000000 HC RX 637 (ALT 250 FOR IP): Performed by: PHYSICAL MEDICINE & REHABILITATION

## 2019-05-12 PROCEDURE — 94760 N-INVAS EAR/PLS OXIMETRY 1: CPT

## 2019-05-12 PROCEDURE — 6370000000 HC RX 637 (ALT 250 FOR IP): Performed by: INTERNAL MEDICINE

## 2019-05-12 PROCEDURE — 1280000000 HC REHAB R&B

## 2019-05-12 PROCEDURE — 6360000002 HC RX W HCPCS: Performed by: PHYSICAL MEDICINE & REHABILITATION

## 2019-05-12 PROCEDURE — 99231 SBSQ HOSP IP/OBS SF/LOW 25: CPT | Performed by: INTERNAL MEDICINE

## 2019-05-12 RX ORDER — INSULIN GLARGINE 100 [IU]/ML
10 INJECTION, SOLUTION SUBCUTANEOUS NIGHTLY
Status: DISCONTINUED | OUTPATIENT
Start: 2019-05-12 | End: 2019-05-15 | Stop reason: HOSPADM

## 2019-05-12 RX ORDER — AMLODIPINE BESYLATE 5 MG/1
2.5 TABLET ORAL DAILY
Status: DISCONTINUED | OUTPATIENT
Start: 2019-05-12 | End: 2019-05-15 | Stop reason: HOSPADM

## 2019-05-12 RX ADMIN — INSULIN LISPRO 2 UNITS: 100 INJECTION, SOLUTION INTRAVENOUS; SUBCUTANEOUS at 09:39

## 2019-05-12 RX ADMIN — ENOXAPARIN SODIUM 30 MG: 30 INJECTION SUBCUTANEOUS at 09:38

## 2019-05-12 RX ADMIN — INSULIN LISPRO 1 UNITS: 100 INJECTION, SOLUTION INTRAVENOUS; SUBCUTANEOUS at 18:41

## 2019-05-12 RX ADMIN — PANTOPRAZOLE SODIUM 40 MG: 40 TABLET, DELAYED RELEASE ORAL at 06:05

## 2019-05-12 RX ADMIN — METOPROLOL SUCCINATE 25 MG: 25 TABLET, EXTENDED RELEASE ORAL at 09:38

## 2019-05-12 RX ADMIN — DOXAZOSIN 4 MG: 4 TABLET ORAL at 21:44

## 2019-05-12 RX ADMIN — INSULIN LISPRO 2 UNITS: 100 INJECTION, SOLUTION INTRAVENOUS; SUBCUTANEOUS at 13:16

## 2019-05-12 RX ADMIN — INSULIN GLARGINE 10 UNITS: 100 INJECTION, SOLUTION SUBCUTANEOUS at 21:44

## 2019-05-12 RX ADMIN — ATORVASTATIN CALCIUM 10 MG: 10 TABLET, FILM COATED ORAL at 09:38

## 2019-05-12 RX ADMIN — AMLODIPINE BESYLATE 2.5 MG: 5 TABLET ORAL at 18:41

## 2019-05-12 RX ADMIN — PREDNISONE 60 MG: 20 TABLET ORAL at 09:38

## 2019-05-12 RX ADMIN — INSULIN LISPRO 1 UNITS: 100 INJECTION, SOLUTION INTRAVENOUS; SUBCUTANEOUS at 21:44

## 2019-05-12 ASSESSMENT — PAIN SCALES - GENERAL
PAINLEVEL_OUTOF10: 1
PAINLEVEL_OUTOF10: 1
PAINLEVEL_OUTOF10: 0

## 2019-05-12 ASSESSMENT — PAIN - FUNCTIONAL ASSESSMENT: PAIN_FUNCTIONAL_ASSESSMENT: ACTIVITIES ARE NOT PREVENTED

## 2019-05-12 ASSESSMENT — PAIN DESCRIPTION - ONSET: ONSET: ON-GOING

## 2019-05-12 ASSESSMENT — PAIN DESCRIPTION - PROGRESSION: CLINICAL_PROGRESSION: NOT CHANGED

## 2019-05-12 ASSESSMENT — PAIN DESCRIPTION - LOCATION: LOCATION: HAND

## 2019-05-12 ASSESSMENT — PAIN DESCRIPTION - FREQUENCY: FREQUENCY: CONTINUOUS

## 2019-05-12 ASSESSMENT — PAIN DESCRIPTION - ORIENTATION: ORIENTATION: RIGHT;LEFT

## 2019-05-12 ASSESSMENT — PAIN DESCRIPTION - DESCRIPTORS: DESCRIPTORS: ACHING

## 2019-05-12 ASSESSMENT — PAIN DESCRIPTION - PAIN TYPE: TYPE: ACUTE PAIN

## 2019-05-12 NOTE — PROGRESS NOTES
Patient admitted s/p Pacemaker placement-Patient oriented to person, place, time. Vital signs stable. Patient remains on room air only. Patients currently experiencing a level 1 pain to hands bilat. Shift assessment completed. Medication administration completed without any complications. Patient is up with activity times 2 with a walker. Requiring Minimal contact assistance. Patients tolerating Low Na+ 2 GM diet. Remains continent of bowel and bladder. No needs voiced at this time. Call light within reach. Will continue to monitor.

## 2019-05-12 NOTE — PROGRESS NOTES
[facility] Internal Medicine Note      Chief Complaint: I am doing well    Subjective/Interval History:    The patient is doing well today. He has no new complaints. Sitting up in the chair, states he is feeling well. Low pressure is running high, blood sugars running high as well. No chest pain or shortness breath. No cough or sputum. No nausea, vomiting, diarrhea. No abdominal pain. No dysuria. The remainder of the review of systems is negative. Medication list reviewed    Objective:    BP (!) 164/79   Pulse 90   Temp 97.2 °F (36.2 °C) (Oral)   Resp 17   Ht 5' 6.93\" (1.7 m)   Wt 183 lb 6.8 oz (83.2 kg)   SpO2 96%   BMI 28.79 kg/m²   Temp  Av.5 °F (36.4 °C)  Min: 97.2 °F (36.2 °C)  Max: 97.9 °F (36.6 °C)    Exam unchanged today:  RRR  Chest- Clear bilaterally. Respirations easy. Left upper chest incision site/dressing CDI. Ext - no edema    The Following Labs Were Reviewed Today:    Recent Results (from the past 24 hour(s))   POCT Glucose    Collection Time: 19  4:42 PM   Result Value Ref Range    POC Glucose 190 (H) 70 - 99 mg/dl    Performed on ACCU-CHEK    POCT Glucose    Collection Time: 19  8:43 PM   Result Value Ref Range    POC Glucose 203 (H) 70 - 99 mg/dl    Performed on ACCU-CHEK    POCT Glucose    Collection Time: 19  9:11 AM   Result Value Ref Range    POC Glucose 223 (H) 70 - 99 mg/dl    Performed on ACCU-CHEK    POCT Glucose    Collection Time: 19 12:04 PM   Result Value Ref Range    POC Glucose 224 (H) 70 - 99 mg/dl    Performed on ACCU-CHEK        ASSESSMENT/PLAN:      Principal Problem:    Syncope, cardiogenic-well on rehabilitation. Stable since pacemaker placed  Active Problems:    Myasthenia gravis ([address] -clinically seems stable. Continue to follow. Symptomatically. Continue prednisone    Hypertension- Norvasc 2.5 mg added today. Neutropenia (HCC)-improved when checked yesterday.     Steroid-induced hyperglycemia-continue sliding scale insulin, and add Lantus 10 units nightly.       Cory Carroll MD, FACP  2:06 PM  5/12/2019

## 2019-05-12 NOTE — PLAN OF CARE
Problem: Pain:  Description  Pain management should include both nonpharmacologic and pharmacologic interventions. Goal: Pain level will decrease  Description  Pain level will decrease  5/12/2019 0141 by Lazara Brewster RN  Outcome: Ongoing  Note:   No complaints of pain or discomfort verbalized. Patient informed to let staff know of any episodes of pain. Problem: Falls - Risk of:  Goal: Will remain free from falls  Description  Will remain free from falls  5/12/2019 0141 by Lazara Brewster RN  Outcome: Ongoing  Note:   Patient free from falls this shift. Fall precautions in place at all times. Bed in lowest position with two side rails up and wheels locked. Call light within reach. Patient able and agreeable to contact for safety appropriately. Patient up with walker x 1 assist and gait belt. Noted with a steady gait. Did not want to use gait belt but informed it was for his safety. Problem: Infection:  Goal: Will remain free from infection  Description  Will remain free from infection  Outcome: Ongoing  Note:   No s/s of infection noted related to pacemaker surgical site. Dressing is intact and dry without drainage.

## 2019-05-13 LAB
ANION GAP SERPL CALCULATED.3IONS-SCNC: 11 MMOL/L (ref 3–16)
BUN BLDV-MCNC: 33 MG/DL (ref 7–20)
CALCIUM SERPL-MCNC: 8.5 MG/DL (ref 8.3–10.6)
CHLORIDE BLD-SCNC: 108 MMOL/L (ref 99–110)
CO2: 24 MMOL/L (ref 21–32)
CREAT SERPL-MCNC: 1.2 MG/DL (ref 0.8–1.3)
EKG ATRIAL RATE: 80 BPM
EKG DIAGNOSIS: NORMAL
EKG Q-T INTERVAL: 424 MS
EKG QRS DURATION: 130 MS
EKG QTC CALCULATION (BAZETT): 507 MS
EKG R AXIS: -37 DEGREES
EKG T AXIS: 47 DEGREES
EKG VENTRICULAR RATE: 86 BPM
GFR AFRICAN AMERICAN: >60
GFR NON-AFRICAN AMERICAN: 57
GLUCOSE BLD-MCNC: 109 MG/DL (ref 70–99)
GLUCOSE BLD-MCNC: 131 MG/DL (ref 70–99)
GLUCOSE BLD-MCNC: 135 MG/DL (ref 70–99)
GLUCOSE BLD-MCNC: 183 MG/DL (ref 70–99)
GLUCOSE BLD-MCNC: 216 MG/DL (ref 70–99)
PERFORMED ON: ABNORMAL
POTASSIUM SERPL-SCNC: 4.2 MMOL/L (ref 3.5–5.1)
SODIUM BLD-SCNC: 143 MMOL/L (ref 136–145)

## 2019-05-13 PROCEDURE — 97530 THERAPEUTIC ACTIVITIES: CPT

## 2019-05-13 PROCEDURE — 94760 N-INVAS EAR/PLS OXIMETRY 1: CPT

## 2019-05-13 PROCEDURE — 6360000002 HC RX W HCPCS: Performed by: PHYSICAL MEDICINE & REHABILITATION

## 2019-05-13 PROCEDURE — 97110 THERAPEUTIC EXERCISES: CPT | Performed by: PHYSICAL THERAPIST

## 2019-05-13 PROCEDURE — 1280000000 HC REHAB R&B

## 2019-05-13 PROCEDURE — 97535 SELF CARE MNGMENT TRAINING: CPT

## 2019-05-13 PROCEDURE — 6370000000 HC RX 637 (ALT 250 FOR IP): Performed by: PHYSICAL MEDICINE & REHABILITATION

## 2019-05-13 PROCEDURE — 97110 THERAPEUTIC EXERCISES: CPT

## 2019-05-13 PROCEDURE — 97116 GAIT TRAINING THERAPY: CPT | Performed by: PHYSICAL THERAPIST

## 2019-05-13 PROCEDURE — 36415 COLL VENOUS BLD VENIPUNCTURE: CPT

## 2019-05-13 PROCEDURE — 97530 THERAPEUTIC ACTIVITIES: CPT | Performed by: PHYSICAL THERAPIST

## 2019-05-13 PROCEDURE — 6370000000 HC RX 637 (ALT 250 FOR IP): Performed by: INTERNAL MEDICINE

## 2019-05-13 PROCEDURE — 99232 SBSQ HOSP IP/OBS MODERATE 35: CPT | Performed by: INTERNAL MEDICINE

## 2019-05-13 PROCEDURE — 80048 BASIC METABOLIC PNL TOTAL CA: CPT

## 2019-05-13 RX ADMIN — APIXABAN 2.5 MG: 2.5 TABLET, FILM COATED ORAL at 20:58

## 2019-05-13 RX ADMIN — INSULIN LISPRO 1 UNITS: 100 INJECTION, SOLUTION INTRAVENOUS; SUBCUTANEOUS at 20:57

## 2019-05-13 RX ADMIN — PREDNISONE 60 MG: 20 TABLET ORAL at 07:52

## 2019-05-13 RX ADMIN — AMLODIPINE BESYLATE 2.5 MG: 5 TABLET ORAL at 07:53

## 2019-05-13 RX ADMIN — INSULIN GLARGINE 10 UNITS: 100 INJECTION, SOLUTION SUBCUTANEOUS at 20:57

## 2019-05-13 RX ADMIN — PANTOPRAZOLE SODIUM 40 MG: 40 TABLET, DELAYED RELEASE ORAL at 06:22

## 2019-05-13 RX ADMIN — DOXAZOSIN 4 MG: 4 TABLET ORAL at 20:58

## 2019-05-13 RX ADMIN — INSULIN LISPRO 1 UNITS: 100 INJECTION, SOLUTION INTRAVENOUS; SUBCUTANEOUS at 19:10

## 2019-05-13 RX ADMIN — METOPROLOL SUCCINATE 25 MG: 25 TABLET, EXTENDED RELEASE ORAL at 07:52

## 2019-05-13 RX ADMIN — ENOXAPARIN SODIUM 30 MG: 30 INJECTION SUBCUTANEOUS at 07:54

## 2019-05-13 RX ADMIN — ATORVASTATIN CALCIUM 10 MG: 10 TABLET, FILM COATED ORAL at 07:52

## 2019-05-13 ASSESSMENT — ENCOUNTER SYMPTOMS
GASTROINTESTINAL NEGATIVE: 1
RESPIRATORY NEGATIVE: 1

## 2019-05-13 ASSESSMENT — PAIN SCALES - GENERAL: PAINLEVEL_OUTOF10: 0

## 2019-05-13 NOTE — PROGRESS NOTES
Physical Therapy  Facility/Department: [address] REHAB  Daily Treatment Note AM and PM  NAME: Becki Soler  : 12/15/1929  MRN: [id number]    Date of Service: 2019    Discharge Recommendations:  Continue to assess pending progress, 5-7 sessions per week   PT Equipment Recommendations  Equipment Needed: No  Other: Will monitor for potential equipt needs. Patient Diagnosis(es): There were no encounter diagnoses. has a past medical history of Arthritis, Hyperlipidemia, Hypertension, Myasthenia gravis, and Polycystic kidney. has a past surgical history that includes joint replacement; back surgery; Tonsillectomy; Lithotripsy; orthopedic surgery; Cholecystectomy; other surgical history; Cataract removal with implant (Right, 13); Cataract removal with implant (Left, 3/22/13); Hand surgery; Small intestine surgery (13); Colon surgery; fracture surgery; Upper gastrointestinal endoscopy (9/10/14); Upper gastrointestinal endoscopy (14); and Upper gastrointestinal endoscopy (2018). Restrictions  Restrictions/Precautions  Restrictions/Precautions: Weight Bearing  Implants present? : Pacemaker(19)  Upper Extremity Weight Bearing Restrictions  Left Upper Extremity Weight Bearing: Weight Bearing As Tolerated  Other: Ortho (Dr. Benita Fountain at bedside 19) -consulted for possible L Radial Head Fx :  \"I do not think the patient has a Radial Head Fx\", no wgt bear or activity restrictions. Position Activity Restriction  Other position/activity restrictions: New Pacemaker placed 19 - L UE ROM Restrictions (Do not elevate affected arm above shoulder for 30 days. No showering for one week)  Subjective   General  Chart Reviewed: Yes  Additional Pertinent Hx: 81 y/o male admit 19 with Syncope/Collapse, Bradycardia, CHF and CHI (Fall at Home). CT Head/C-Spine negative. CT Facial Bones - L Inferior Orbital Wall Fx.   X-Ray L Elbow - Possible Nondisplaced Radial Head Fx (per Ortho 19 - \"I do not think the pt has Radial Head Fx.\"). PMH as noted including Myasthenia Gravis, Exp Lap,  Back Surg, B THR, R Elbow/L Hand and B Feet (Club Foot) Surgs, Arthritis. Response To Previous Treatment: Patient with no complaints from previous session. Family / Caregiver Present: No  Referring Practitioner: Dr. Aki Dow: Patient agreeable to therapy, and with 1/10 pain in knuckles due to fall. General Comment  Comments: goes by \"Maninder\"          Orientation  Orientation  Overall Orientation Status: Within Functional Limits  Cognition      Objective      Transfers  Sit to Stand: Stand by assistance  Stand to sit: Stand by assistance  Bed to Chair: Stand by assistance  Ambulation  Ambulation?: Yes  More Ambulation?: Yes  Ambulation 1  Surface: level tile  Device: Rolling Walker  Assistance: Stand by assistance  Quality of Gait: Slightly flexed posture with decreased clearance in bilateral LE's. Distance: 220' x 2  Comments: HR 64 and O2 sats 98%   Stairs/Curb  Stairs?: Yes  Stairs  # Steps : 12  Stairs Height: 6\"  Rails: Bilateral  Curbs: 6\"(outside with rolling walker and CGA)  Device: Rolling walker  Assistance: Contact guard assistance        Exercises  Comments: standing exercises-marches x 30, toe raises/heel rocks x 15, mini squats x 15, hip abduction  and extension x 10, knee flexion x 10   Second Session-  S/ Patient reports no significant pain - agreeable to therapy. O/ Daughter present , reviewed pacemaker precautions  Patient sit to stand with SBA, ambulated with wheeled walker 200' with SBA, O2 sats 92% after ambulation  Performed sit to stand from high mat x 10 with no hands initially but after 5 needed to use R UE  Performed standing balance, static with minimal UE assist, step forward and back x 10, more difficult balancing on R LE which is shorter then L LE  Performed curb step with CGA with cues to sequence.   Performed Nustep x 10 minutes with B LE and R UE WL 3, 057 goals : STG=LTG  Patient Goals   Patient goals : Return to Independent living and back to my regular life    Plan    Plan  Times per week: 5-6x week  Times per day: Twice a day  Plan weeks: 1 week  Current Treatment Recommendations: Balance Training, Functional Mobility Training, Transfer Training, Gait Training, Safety Education & Training, Patient/Caregiver Education & Training, ADL/Self-care Training, Endurance Training, Stair training, Home Exercise Program, Equipment Evaluation, Education, & procurement, Strengthening  Safety Devices  Type of devices:  All fall risk precautions in place, Gait belt(awaiting OT session)  Restraints  Initially in place: No     Therapy Time   Individual Concurrent Group Co-treatment   Time In 0945         Time Out 1030         Minutes 45         Timed Code Treatment Minutes: 39 [address] [facility] Time     Individual Co-treatment   Time In [address]     Minutes [facility] PT # [id number]

## 2019-05-13 NOTE — PROGRESS NOTES
ACCU-CHEK    POCT Glucose    Collection Time: 05/12/19  4:50 PM   Result Value Ref Range    POC Glucose 171 (H) 70 - 99 mg/dl    Performed on ACCU-CHEK    POCT Glucose    Collection Time: 05/12/19  8:22 PM   Result Value Ref Range    POC Glucose 232 (H) 70 - 99 mg/dl    Performed on ACCU-CHEK    Basic Metabolic Panel    Collection Time: 05/13/19  7:09 AM   Result Value Ref Range    Sodium 143 136 - 145 mmol/L    Potassium 4.2 3.5 - 5.1 mmol/L    Chloride 108 99 - 110 mmol/L    CO2 24 21 - 32 mmol/L    Anion Gap 11 3 - 16    Glucose 131 (H) 70 - 99 mg/dL    BUN 33 (H) 7 - 20 mg/dL    CREATININE 1.2 0.8 - 1.3 mg/dL    GFR Non- 57 (A) >60    GFR African American >60 >60    Calcium 8.5 8.3 - 10.6 mg/dL   POCT Glucose    Collection Time: 05/13/19  7:13 AM   Result Value Ref Range    POC Glucose 135 (H) 70 - 99 mg/dl    Performed on ACCU-CHEK        Therapy progress:  PT  Upper Extremity Weight Bearing Restrictions  Left Upper Extremity Weight Bearing: Weight Bearing As Tolerated  Other: Ortho (Dr. Bailey Link at bedside 5/8/19) -consulted for possible L Radial Head Fx :  \"I do not think the patient has a Radial Head Fx\", no wgt bear or activity restrictions. Position Activity Restriction  Other position/activity restrictions: New Pacemaker placed 5/7/19 - L UE ROM Restrictions (Do not elevate affected arm above shoulder for 30 days. No showering for one week)  Objective     Sit to Stand: Stand by assistance  Stand to sit: Stand by assistance  Bed to Chair: Stand by assistance  Device: [employer]  Assistance: Stand by assistance  Distance: 220' x 2  OT  PT Equipment Recommendations  Equipment Needed: No  Other: Will monitor for potential equipt needs. Toilet - Technique: Ambulating  Equipment Used: Raised toilet seat with rails  Toilet Transfers Comments: has handicap height commode and grab bars at home.   CGA/SBA                Assessment and Plan:      Cardiogenic syncope,  Atrial fibrillation with slow ventricular response- Permanent pacemaker placement      Closed fracture of the left inferior orbital ridge- Monitor      Ocular myasthenia gravis- Prednisone      History of GI bleed- Protonix      Stage III chronic kidney disease- monitor      Hypertension - Toprol, Cardura      Hyperlipidemia- Lipitor     Bowels: Schedule Miralax + Senna S. Follow bowel movements. Enema or suppository if needed.      Bladder: Check PVR x 3.   [address] if PVR > 200ml or if any volume is > 500 ml.      Pain: Tylenol is ordered prn.        Aleksey Camacho MD, 5/13/2019, 11:01 AM

## 2019-05-13 NOTE — PROGRESS NOTES
IM Progress Note          CC: Syncope;diplopia    Interval history:  No further spells of dizziness or syncope. No chest pain or sob. His diplopia is better on Prednisone. BP up a little but medication started. Review of Systems:  Review of Systems   Constitutional: Positive for activity change. Negative for appetite change, chills, fatigue and fever. Eyes:        Diplopia better. Respiratory: Negative. Cardiovascular: Negative. Gastrointestinal: Negative. Neurological: Negative for dizziness and syncope. 14 point ros otherwise negative. Objective:    BP (!) 140/85   Pulse 96   Temp 98 °F (36.7 °C) (Oral)   Resp 18   Ht 5' 6.93\" (1.7 m)   Wt 185 lb 6.5 oz (84.1 kg)   SpO2 94%   BMI 29.10 kg/m²     Intake/Output Summary (Last 24 hours) at 5/13/2019 0908  Last data filed at 5/12/2019 2145  Gross per 24 hour   Intake 840 ml   Output --   Net 840 ml        Physical Exam   Constitutional: He is oriented to person, place, and time. He appears well-developed and well-nourished. No distress. Eyes: Pupils are equal, round, and reactive to light. Left lateral rectus movement improved. Neck: No JVD present. Cardiovascular: Normal rate and normal heart sounds. Rhythm irregular. Pulmonary/Chest: Effort normal and breath sounds normal. No respiratory distress. Abdominal: Soft. Bowel sounds are normal. There is no tenderness. Musculoskeletal: He exhibits no edema. Neurological: He is alert and oriented to person, place, and time. Skin: Skin is warm. CBC:   Recent Labs     05/11/19  0758   WBC 5.5   HGB 11.5*   *     BMP:    Recent Labs     05/13/19  0709      K 4.2      CO2 24   BUN 33*   CREATININE 1.2   GLUCOSE 131*     Hepatic: No results for input(s): AST, ALT, ALB, BILITOT, ALKPHOS in the last 72 hours. Troponin: No results for input(s): TROPONINI in the last 72 hours. BNP: No results for input(s): BNP in the last 72 hours.   Lipids: No

## 2019-05-13 NOTE — PROGRESS NOTES
Occupational Therapy  Facility/Department: [address] IP REHAB  Daily Treatment Note  NAME: Jacquelin Monge  : 12/15/1929  MRN: [id number]    Date of Service: 2019    Discharge Recommendations:  Home with assist PRN  OT Equipment Recommendations  Other: none    Assessment   Performance deficits / Impairments: Decreased functional mobility ; Decreased endurance;Decreased ADL status; Decreased safe awareness;Decreased balance;Decreased vision/visual deficit; Decreased high-level IADLs  Assessment: Pt is having some difficulty with sit to stand from lower surface, added solid seat insert to wheelchair to improve transfers. Required min assist to stand from wheelchair, Generally CGA/SBA for transfsers, cues to reach back with right arm, not pull on walker with left. Pt does well once up, but struggles somewhat with sit to stand. Need to continue to work on activity tolerance, he feels well doing ADL's so will only complete one more time for discharge ADL on . Cont with plan, discharge Thurs with no home OT  Treatment Diagnosis: decreased ADl, balance, IADL,   Prognosis: Good  REQUIRES OT FOLLOW UP: Yes  Activity Tolerance  Activity Tolerance: Patient Tolerated treatment well  Activity Tolerance: pacemaker precautions, min rest breaks as needed  Safety Devices  Safety Devices in place: Yes  Type of devices: Gait belt(to room per transport at end of session)         Patient Diagnosis(es): PPM placement, debility     has a past medical history of Arthritis, Hyperlipidemia, Hypertension, Myasthenia gravis, and Polycystic kidney. has a past surgical history that includes joint replacement; back surgery; Tonsillectomy; Lithotripsy; orthopedic surgery; Cholecystectomy; other surgical history; Cataract removal with implant (Right, 13); Cataract removal with implant (Left, 3/22/13); Hand surgery; Small intestine surgery (13); Colon surgery; fracture surgery; Upper gastrointestinal endoscopy (9/10/14);  Upper He was able to retrieve can of pop from the lower shelf in refrig, carry to counter top approx 10 ft away with SBA. Uses countertop for balance when available, but also able to cross the room without device-    Transfer to bed, wheelchair, recliner with SBA/CGA. Sometimes getting up with only support of right arm, othertimes assist of both arms. Initial cues to push up from chair, not pull on walker    Returned to room, pt able to step over 4 inch ledge to simulate his cut out tub, although he stated his is a little higher. Do not anticipate it being a problem    Chair alarm set, pt with daughter, in [facility],  Call light in reach, all needs met     Plan   Plan  Times per week: 5-6 days per week  Times per day: Twice a day  Plan weeks: 1 week pt will. .  Current Treatment Recommendations: Strengthening, Functional Mobility Training, Patient/Caregiver Education & Training, ROM, Endurance Training, Balance Training, Self-Care / ADL, Home Management Training, Equipment Evaluation, Education, & procurement, Safety Education & Training               Goals  Short term goals  Time Frame for Short term goals: 1 week  pt will. ..   Short term goal 1: bathe indep with AD for shower, no device sponge bath  Short term goal 2: dress indep  Short term goal 3: toilet indep with AD  Short term goal 4: transfer with AD as needed  Short term goal 5: functional mobility and simple meal prep indep with AD as needed  Long term goals  Time Frame for Long term goals : same as stg  Patient Goals   Patient goals : \"I want to get out of here in a week\"  \"I dont know, you tell me what I need to do\"  Agreed that he wants to be able to complete self care indep, ambulate indep, drive       Therapy Time   Individual Concurrent Group Co-treatment   Time In 1030         Time Out 1115         Minutes 45         Timed Code Treatment Minutes: 45 Minutes     Therapy Time     Individual Co-treatment   Time In 1345     Time Out 1430     Minutes 45 Savage Pae, OTR/L 204

## 2019-05-13 NOTE — PROGRESS NOTES
Patient oriented to person, place, time. Vital signs stable. Patient remains on room air only. Patients currently denies pain. Shift assessment completed. Medication administration completed without any complications. Patient is up with activity times 1 with a walker. Requiring Moderate assistance. Patients tolerating low sodium diet. Remains continent of bowel and bladder. No needs voiced at this time. Call light within reach. Will continue to monitor.

## 2019-05-14 LAB
GLUCOSE BLD-MCNC: 112 MG/DL (ref 70–99)
GLUCOSE BLD-MCNC: 176 MG/DL (ref 70–99)
GLUCOSE BLD-MCNC: 187 MG/DL (ref 70–99)
GLUCOSE BLD-MCNC: 232 MG/DL (ref 70–99)
PERFORMED ON: ABNORMAL

## 2019-05-14 PROCEDURE — 97530 THERAPEUTIC ACTIVITIES: CPT

## 2019-05-14 PROCEDURE — 97116 GAIT TRAINING THERAPY: CPT | Performed by: PHYSICAL THERAPIST

## 2019-05-14 PROCEDURE — 97110 THERAPEUTIC EXERCISES: CPT | Performed by: PHYSICAL THERAPIST

## 2019-05-14 PROCEDURE — 97110 THERAPEUTIC EXERCISES: CPT

## 2019-05-14 PROCEDURE — 1280000000 HC REHAB R&B

## 2019-05-14 PROCEDURE — 6370000000 HC RX 637 (ALT 250 FOR IP): Performed by: PHYSICAL MEDICINE & REHABILITATION

## 2019-05-14 PROCEDURE — 97535 SELF CARE MNGMENT TRAINING: CPT

## 2019-05-14 PROCEDURE — 97530 THERAPEUTIC ACTIVITIES: CPT | Performed by: PHYSICAL THERAPIST

## 2019-05-14 PROCEDURE — 6370000000 HC RX 637 (ALT 250 FOR IP): Performed by: INTERNAL MEDICINE

## 2019-05-14 RX ADMIN — INSULIN GLARGINE 10 UNITS: 100 INJECTION, SOLUTION SUBCUTANEOUS at 21:16

## 2019-05-14 RX ADMIN — INSULIN LISPRO 1 UNITS: 100 INJECTION, SOLUTION INTRAVENOUS; SUBCUTANEOUS at 12:19

## 2019-05-14 RX ADMIN — METOPROLOL SUCCINATE 25 MG: 25 TABLET, EXTENDED RELEASE ORAL at 07:54

## 2019-05-14 RX ADMIN — DOXAZOSIN 4 MG: 4 TABLET ORAL at 21:16

## 2019-05-14 RX ADMIN — PREDNISONE 60 MG: 20 TABLET ORAL at 07:55

## 2019-05-14 RX ADMIN — APIXABAN 2.5 MG: 2.5 TABLET, FILM COATED ORAL at 21:16

## 2019-05-14 RX ADMIN — INSULIN LISPRO 1 UNITS: 100 INJECTION, SOLUTION INTRAVENOUS; SUBCUTANEOUS at 21:17

## 2019-05-14 RX ADMIN — SENNOSIDES, DOCUSATE SODIUM 2 TABLET: 50; 8.6 TABLET, FILM COATED ORAL at 21:16

## 2019-05-14 RX ADMIN — ATORVASTATIN CALCIUM 10 MG: 10 TABLET, FILM COATED ORAL at 07:55

## 2019-05-14 RX ADMIN — APIXABAN 2.5 MG: 2.5 TABLET, FILM COATED ORAL at 07:55

## 2019-05-14 RX ADMIN — AMLODIPINE BESYLATE 2.5 MG: 5 TABLET ORAL at 07:54

## 2019-05-14 RX ADMIN — PANTOPRAZOLE SODIUM 40 MG: 40 TABLET, DELAYED RELEASE ORAL at 05:21

## 2019-05-14 RX ADMIN — INSULIN LISPRO 1 UNITS: 100 INJECTION, SOLUTION INTRAVENOUS; SUBCUTANEOUS at 17:29

## 2019-05-14 ASSESSMENT — PAIN SCALES - GENERAL
PAINLEVEL_OUTOF10: 0

## 2019-05-14 NOTE — PROGRESS NOTES
Physical Therapy  Facility/Department: [address] REHAB  Daily Treatment Note  - AM and PM Sessions    NAME: Dieudonne Berman  : 12/15/1929  MRN: [id number]    Date of Service: 2019    Discharge Recommendations:  Home independently(denies need for home therapies)   PT Equipment Recommendations  Equipment Needed: No  Other: Will monitor for potential equipt needs. - owns wheeled walker    Patient Diagnosis(es): There were no encounter diagnoses. has a past medical history of Arthritis, Hyperlipidemia, Hypertension, Myasthenia gravis, and Polycystic kidney. has a past surgical history that includes joint replacement; back surgery; Tonsillectomy; Lithotripsy; orthopedic surgery; Cholecystectomy; other surgical history; Cataract removal with implant (Right, 13); Cataract removal with implant (Left, 3/22/13); Hand surgery; Small intestine surgery (13); Colon surgery; fracture surgery; Upper gastrointestinal endoscopy (9/10/14); Upper gastrointestinal endoscopy (14); and Upper gastrointestinal endoscopy (2018). Restrictions  Restrictions/Precautions  Restrictions/Precautions: Weight Bearing  Implants present? : Pacemaker(19)  Upper Extremity Weight Bearing Restrictions  Left Upper Extremity Weight Bearing: Weight Bearing As Tolerated  Other: Ortho (Dr. Mariana Laureano at bedside 19) -consulted for possible L Radial Head Fx :  \"I do not think the patient has a Radial Head Fx\", no wgt bear or activity restrictions. Position Activity Restriction  Other position/activity restrictions: New Pacemaker placed 19 - L UE ROM Restrictions (Do not elevate affected arm above shoulder for 30 days. No showering for one week)  Subjective   General  Chart Reviewed: Yes  Additional Pertinent Hx: 81 y/o male admit 19 with Syncope/Collapse, Bradycardia, CHF and CHI (Fall at Home). CT Head/C-Spine negative. CT Facial Bones - L Inferior Orbital Wall Fx.   X-Ray L Elbow - Possible Nondisplaced Radial Head Fx (per Ortho 5/8/19 - \"I do not think the pt has Radial Head Fx.\"). PMH as noted including Myasthenia Gravis, Exp Lap,  Back Surg, B THR, R Elbow/L Hand and B Feet (Club Foot) Surgs, Arthritis. Response To Previous Treatment: Patient with no complaints from previous session. Family / Caregiver Present: No  Referring Practitioner: Dr. Helms Lab: Patient agreeable to therapy, and with 1/10 pain in knuckles on bilateral hands due to fall. General Comment  Comments: goes by \"Maninder\"          Orientation  Orientation  Overall Orientation Status: Within Functional Limits  Cognition      Objective   Bed mobility  Bridging: Independent  Rolling to Left: Independent  Rolling to Right: Independent  Supine to Sit: Independent  Sit to Supine: Independent  Scooting: Independent  Comment: self-limits use of L UE due to recent pacemaker  Transfers  Sit to Stand: Supervision  Stand to sit: Supervision  Bed to Chair: Supervision(turning and backing up with wheeled walker)  Car Transfer: Supervision  Comment: self-limits use of L UE due to recent pacemaker  Ambulation  Ambulation?: Yes  More Ambulation?: Yes  Ambulation 1  Surface: level tile  Device: Rolling Walker  Assistance: Supervision  Quality of Gait: Slightly flexed posture with decreased clearance in bilateral LE's, steady with no loss of balance  Distance: 200' x 4  Stairs/Curb  Stairs?: Yes  Stairs  # Steps : 12  Stairs Height: 6\"  Rails: Bilateral  Curbs: 6\"  Device: Rolling walker  Assistance: Stand by assistance  Comment: with steps - step to pattern varies descending R or L LE; able to safely manage wheeled walker with curb                           Second Session    Patient is asking about possible discharge to home on Wednesday, 5/15, after PM therapies since his daughter is not able to transport him on Thursday, 5/16. Albert Olea, is contacting physician about anticipated change in discharge plans.     PT gave patient scheduled therapy times for tomorrow, Wed, 5/15, and ensured patient would complete therapy sessions as early as possible to accommodate discharge needs. Administered BIMS in preparation for discharge - scored 15/15. Sit to stand with modified independence. Ambulated 200' x 2 with wheeled walker and modified independence, steady with no loss balance. Able to  pen from floor from standing position with hand on walker for balance and modified independence. PM Assessment   Patient is now modified independent with transfers and ambulation with wheeled walker on level surfaces. All goals met and patient is safe for room independence with wheeled walker in preparation for discharge back to independent living likely Wednesday, 5/15/19, after PM therapies. Assessment   Body structures, Functions, Activity limitations: Decreased functional mobility ; Decreased ADL status; Decreased endurance;Decreased balance;Decreased vision/visual deficit; Increased Pain;Decreased safe awareness;Decreased strength  Assessment: Patient has met 2/5 short term goals and is progressing well toward all goals, anticipating discharge to independent living on Thursday, 5/16/19, with no further therapy recommended. Presently, patient requires supervision for transfers, ambulated with wheeled walker with supervision community distances and performed 12 steps with B rails with SBA. Patient would continue to benefit from inpatient rehab to improve overall independence with mobility with or without device to return home to independent facility. Anticipate D/C Thursday, 5/16/19, no continued therapy recommended or required at discharge.    Treatment Diagnosis: impaired functional mobility due to recent fall and pacemaker placement  Prognosis: Good  Patient Education: safety with functional mobility, pacemaker precautions  REQUIRES PT FOLLOW UP: Yes  Activity Tolerance  Activity Tolerance: Patient Tolerated treatment well Goals  Short term goals  Time Frame for Short term goals: 5-7 days  Short term goal 1: Bed Mob Independent. - met - 5/14/19  Short term goal 2: Transfers with assist device  modified Independent. Short term goal 3: Amb with assist device 200' modified Independent. Short term goal 4: ascend /descend curb step with wheeled walker with supervision  Short term goal 5: ascend/descend 12 steps with B rails with SBA - met - 5/14/19  Long term goals  Time Frame for Long term goals : STG=LTG  Patient Goals   Patient goals : Return to Independent living and back to my regular life    Plan    Plan  Times per week: 5-6x week  Times per day: Twice a day  Plan weeks: 1 week  Current Treatment Recommendations: Balance Training, Functional Mobility Training, Transfer Training, Gait Training, Safety Education & Training, Patient/Caregiver Education & Training, ADL/Self-care Training, Endurance Training, Stair training, Home Exercise Program, Equipment Evaluation, Education, & procurement, Strengthening  Safety Devices  Type of devices:  All fall risk precautions in place, Gait belt(awaiting OT session)  Restraints  Initially in place: No     Therapy Time   Individual Concurrent Group Co-treatment   Time In 0815         Time Out 0900         Minutes 45         Timed Code Treatment Minutes: 39 Minutes     Second Session Therapy Time   Individual Co-treatment   Time In [facility]     Time Out [address], PT #[id number]

## 2019-05-14 NOTE — PROGRESS NOTES
OK with Dr. Luis Moura to skip, due to pt not complaining, pt does state he had a partial stomach removal and has BM's every 3-4 days. Will monitor.  Electronically signed by Rebecca Dave RN on 5/14/2019 at 1:23 PM

## 2019-05-14 NOTE — PROGRESS NOTES
Department of [facility] Progress Note    Patient Identification:  Gabriella York  [id number]  : 12/15/1929  Admit date: 2019      Diagnosis:   Patient Active Problem List   Diagnosis    [facility]henia [facility]is ([facility] [address]    Polycystic kidney    Hypertension    Hyperlipidemia    Arthritis    Hyperkalemia    Acute renal failure (HCC)    GI bleeding    Hypotension    History of esophageal stricture    RBBB (right bundle branch block with left anterior fascicular block)    Acute blood loss anemia    Duodenal ulcer    KENNY (acute kidney injury) ([facility] [address]    Acute upper GI bleed    UGI bleed    History of GI bleed    Pneumonia of left lower lobe due to infectious organism (HCC)    Pleural effusion    Elevated hemidiaphragm    Acute respiratory failure with hypoxia (HCC)    NSVT (nonsustained ventricular tachycardia) ([facility])    Syncope and collapse    Atrial fibrillation with slow ventricular response ([facility])    Closed fracture of orbital wall ([facility])    Stage 3 chronic kidney disease (HCC)    Hyperglycemia    Neutropenia (HCC)    S/P placement of cardiac pacemaker    Syncope, cardiogenic    Cardiac pacemaker recipient           Subjective: Pt seen this AM.  Patient did better in his therapies yesterday. He continues to notice improvement with his double vision with the prednisone treatment. He is making progress in his therapies with his transfers, gait, and ADLs. We plan a discharge to home  an 2 days on Thursday, and he states he will not need therapies at home at discharge. Repeat labs look better for his kidney function, and his blood sugars are under control. .     /86   Pulse 89   Temp 97.5 °F (36.4 °C) (Oral)   Resp 18   Ht 5' 6.93\" (1.7 m)   Wt 185 lb 6.5 oz (84.1 kg)   SpO2 96%   BMI 29.10 kg/m²     Last 24 hour lab  Recent Results (from the past 24 hour(s))   POCT Glucose    Collection Time: 19 11:41 AM   Result Value Ref Range    POC Glucose 109 (H) 70 - 99 mg/dl    Performed on ACCU-CHEK    POCT Glucose    Collection Time: 05/13/19  4:41 PM   Result Value Ref Range    POC Glucose 183 (H) 70 - 99 mg/dl    Performed on ACCU-CHEK    POCT Glucose    Collection Time: 05/13/19  8:23 PM   Result Value Ref Range    POC Glucose 216 (H) 70 - 99 mg/dl    Performed on ACCU-CHEK    POCT Glucose    Collection Time: 05/14/19  7:26 AM   Result Value Ref Range    POC Glucose 112 (H) 70 - 99 mg/dl    Performed on ACCU-CHEK        Therapy progress:  PT  Upper Extremity Weight Bearing Restrictions  Left Upper Extremity Weight Bearing: Weight Bearing As Tolerated  Other: Ortho (Dr. Airam Jackson at bedside 5/8/19) -consulted for possible L Radial Head Fx :  \"I do not think the patient has a Radial Head Fx\", no wgt bear or activity restrictions. Position Activity Restriction  Other position/activity restrictions: New Pacemaker placed 5/7/19 - L UE ROM Restrictions (Do not elevate affected arm above shoulder for 30 days. No showering for one week)  Objective     Sit to Stand: Supervision  Stand to sit: Supervision  Bed to Chair: Supervision(turning and backing up with wheeled walker)  Device: [address]: Stand by assistance  Distance: 200' x 4  OT  PT Equipment Recommendations  Equipment Needed: No  Other: Will monitor for potential equipt needs. - owns wheeled walker  Toilet - Technique: Ambulating  Equipment Used: Raised toilet seat with rails  Toilet Transfers Comments: has handicap height commode and grab bars at home.   CGA/SBA                Assessment and Plan:      Cardiogenic syncope,  Atrial fibrillation with slow ventricular response- Permanent pacemaker placement      Closed fracture of the left inferior orbital ridge- Monitor      Ocular myasthenia gravis- Prednisone      History of GI bleed- Protonix      Stage III chronic kidney disease- monitor      Hypertension - Toprol, Cardura      Hyperlipidemia- Lipitor     Bowels: Schedule Miralax + Senna S. Follow bowel movements. Enema or suppository if needed.      Bladder: Check PVR x 3.   [address] if PVR > 200ml or if any volume is > 500 ml.      Pain: Tylenol is ordered prn.        Aleksey Camacho MD, 5/14/2019, 9:26 AM

## 2019-05-14 NOTE — PLAN OF CARE
Problem: Pain:  Goal: Control of chronic pain  Description  Control of chronic pain  Outcome: Ongoing  Note:   Patient uses pain scale appropriately. Problem: Falls - Risk of:  Goal: Will remain free from falls  Description  Will remain free from falls  Outcome: Ongoing  Note:   Patient free of falls this shift, all safety precautions in place. Goal: Absence of physical injury  Description  Absence of physical injury  Outcome: Ongoing  Note:   All safety precautions in place including nonskid socks on feet, bed exit alarm on and posey clip attached to patient when in chair, phones and call light in reach, will continue to monitor. Problem: Risk for Impaired Skin Integrity  Goal: Tissue integrity - skin and mucous membranes  Description  Structural intactness and normal physiological function of skin and  mucous membranes. Outcome: Ongoing     Problem: Infection:  Goal: Will remain free from infection  Description  Will remain free from infection  Outcome: Ongoing  Note:   Skin assessment performed for signs and symptoms of infection. Problem: Discharge Planning:  Goal: Patients continuum of care needs are met  Description  Patients continuum of care needs are met  Outcome: Ongoing  Note:   Participates in hourly rounding, states needs as they arise by using call light appropriately, and does call in advance of needs.

## 2019-05-14 NOTE — PROGRESS NOTES
Pt states he would like the pneumonia vaccine tomorrow before leaving the Rehab Unit. Call in to [phone] [facility] to verify pt prescriptions. All current meds before this occurrence have been verified, checked and are now complete.  Electronically signed by Brent Hamlin RN on 5/14/2019 at 7:35 PM

## 2019-05-14 NOTE — PATIENT CARE CONFERENCE
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 PHYSICAL THERAPY    Bed mobility  Bridging: Independent  Rolling to Left: Independent  Rolling to Right: Independent  Supine to Sit: Independent  Sit to Supine: Independent  Scooting: Independent  Comment: self-limits use of L UE due to recent pacemaker    Transfers:  Sit to Stand: Modified independent  Stand to sit: Modified independent  Bed to Chair: Modified independent  Comment: self-limits use of L UE due to recent pacemaker    Ambulation 1  Surface: level tile  Device: Rolling Walker  Assistance: Modified Independent  Quality of Gait: Slightly flexed posture with decreased clearance in bilateral LE's, steady with no loss of balance  Distance: 200' x 2    Stairs  # Steps : 12  Stairs Height: 6\"  Rails: Bilateral  Curbs: 6\"  Device: Rolling walker  Assistance: Supervision  Comment: with steps - step to pattern varies descending R or L LE; able to safely manage wheeled walker with curb    Car Transfer: Modified independent      FIMS:  Bed, Chair, Wheel Chair: 5 - Requires setup/supervision/cues  Walk: 5 - Exception Household Locomotion Walks at least 50 feet Independently with or without a device May require an extra amount of time OR there may be a concern for safety  Distance Walked: 200'  Stairs: 5 - Exception HouseHold Ambulation Goes up and down 4 to 6 stairs independently, w or w/o a device. The activity takes more than a reasonable amount of time, or there are safety considerations    PT Equipment Recommendations  Equipment Needed: No  Other: Will monitor for potential equipt needs.  - owns wheeled walker    Assessment: Patient has met 2/5 short term goals and is progressing well toward all goals, anticipating discharge to independent living on Thursday, 5/16/19, with no further therapy recommended. Presently, patient requires supervision for transfers, ambulated with wheeled walker with supervision community distances and performed 12 steps with B rails with SBA. Patient would continue to benefit from inpatient rehab to improve overall independence with mobility with or without device to return home to independent facility. Anticipate D/C Thursday, 5/16/19, no continued therapy recommended or required at discharge. PM Assessment   Patient is now modified independent with transfers and ambulation with wheeled walker on level surfaces.  All goals met and patient is safe for room independence with wheeled walker in preparation for discharge back to independent living likely Wednesday, 5/15/19, after PM therapies.              SPEECH THERAPY (intentionally left blank if not actively being seen by this service):      OCCUPATIONAL THERAPY    ADL  Grooming: Setup  UE Bathing: Setup  LE Bathing: Stand by assistance(sat to cross legs to reach feet, stood with SBA to bathe buttocks)  UE Dressing: Setup  LE Dressing: Minimal assistance(able to cross legs to alexis socks and shoes, thread underwear, pants over feet. Sit to stand with min assist to manage pants up, then only CGA/SBA for balance)  Toileting: Contact guard assistance(urinated on commode, managed pants up/down)  Additional Comments: pt has declined ADL with staff since first session. States he has been completing per self. Will complete discharge ADL Weds    Bed mobility  Bridging: Independent  Rolling to Left: Independent  Rolling to Right: Independent  Supine to Sit: Independent  Sit to Supine: Independent  Scooting: Independent  Comment: self-limits use of L UE due to recent pacemaker    Functional Transfers:   Toilet Transfers  Toilet - Technique: Ambulating  Equipment Used: Raised toilet seat with rails  Toilet Transfer: Contact guard assistance, Devika Minium [] Standard walker [] Gait belt [] cane: _________  [] Sliding board [] Alternate seating/furniture [] O2 [] Hip Kit: _______  [] Life Line [] Other: _______  Factors facilitating achievement of predicted outcomes: Family support, Motivated, Cooperative and Pleasant  Barriers to the achievement of predicted outcomes/Interventions:    no significant barriers- ready for DC       Interdisciplinary Individualized Plan of Care Review:    · Continue Current Plan of Care: No    · Modifications:______transition home with home care _______________________    Special Needs in the Upcoming Week :    [] Family/Caregiver Education  [] Home visit  []Therapeutic Pass   [] Consults:_______    [] Other;_______    Patient Rehab Team Goals for the Upcoming Week:  1. No new goals since anticipate discharge home today 5/15/19              Team Members Present at Conference:  Physician:  : Desmond Del Rio [facility]    Occupational Therapist: Barbara Gracia, OTR/L [address] [place] [address]  Physical Therapist:  Noni Guerin, [facility]  Speech Therapist:   Nurse: Sarika Perez RN, CRRN  Dietician: Jose Kate RD, LD   Candido Glez [facility]     I led this team conference and I approve the established interdisciplinary plan of care as documented within the medical record of Vita [facility].     MD: Dr. Yissel Magaña

## 2019-05-14 NOTE — PROGRESS NOTES
Occupational Therapy  Facility/Department: [address] IP REHAB  Daily Treatment Note  NAME: Tomy Lopez  : 12/15/1929  MRN: [id number]    Date of Service: 2019    Discharge Recommendations:  Home with assist PRN       Assessment   Performance deficits / Impairments: Decreased functional mobility ; Decreased endurance;Decreased ADL status; Decreased safe awareness;Decreased balance;Decreased vision/visual deficit; Decreased high-level IADLs  Assessment: Pt tolerated session well. Pt agreed to kitchen task. Pt completed with SBa and demonstrates good safety awareness in the kitchen. Pt completed transfers with SBA. Treatment Diagnosis: decreased ADl, balance, IADL,   Prognosis: Good  History: This patient is an 80-year-old white male with a history of atrial fibrillation, previous GI bleeding, and myasthenia gravis. The patient came to the hospital with dizziness, syncopal episode  And striking his head and face when he fell down, and found to have significant bradycardia with a pulse in the 20s.  CT scanning of his facial bones revealed an inferior orbital fracture as well as a possible left radial head fracture. The patient does have significant ecchymosis about the left eye. ppatient was seen by orthopedics and left radial head  Fracture was ruled out. Patient was seen by cardiology, and permanent pacemaker placement was recommended. He had his permanent pacemaker placed by cardiology on . Carl Shaikh was started therapies afterwards, but is weak overall, not safe to return to independent living. [facility] is now admitted to our rehabilitation unit to improve his strength, gait, balance, and safety before discharge. Patient states he has double vision from his myasthenia gravis, and needs to start on prednisone, which he uses to treat this problem at home. We'll start him on 60 mg of prednisone daily at this time.  (copied per note 5/10/19)  Patient Education: Kitchen safety, safe transfers  [address] UP: Yes  Activity Tolerance  Activity Tolerance: Patient Tolerated treatment well  Safety Devices  Type of devices: Gait belt         Patient Diagnosis(es): There were no encounter diagnoses. has a past medical history of Arthritis, Hyperlipidemia, Hypertension, Myasthenia gravis, and Polycystic kidney. has a past surgical history that includes joint replacement; back surgery; Tonsillectomy; Lithotripsy; orthopedic surgery; Cholecystectomy; other surgical history; Cataract removal with implant (Right, 2/22/13); Cataract removal with implant (Left, 3/22/13); Hand surgery; Small intestine surgery (4/9/13); Colon surgery; fracture surgery; Upper gastrointestinal endoscopy (9/10/14); Upper gastrointestinal endoscopy (9-17-14); and Upper gastrointestinal endoscopy (04/18/2018). Restrictions  Restrictions/Precautions  Restrictions/Precautions: Weight Bearing  Implants present? : Pacemaker(5/7/19)  Upper Extremity Weight Bearing Restrictions  Left Upper Extremity Weight Bearing: Weight Bearing As Tolerated  Other: Ortho (Dr. Zeina Fallon at bedside 5/8/19) -consulted for possible L Radial Head Fx :  \"I do not think the patient has a Radial Head Fx\", no wgt bear or activity restrictions. Position Activity Restriction  Other position/activity restrictions: New Pacemaker placed 5/7/19 - L UE ROM Restrictions (Do not elevate affected arm above shoulder for 30 days. No showering for one week)  Subjective   General  Chart Reviewed: Yes  Additional Pertinent Hx: This patient is an 80-year-old white male with a history of atrial fibrillation, previous GI bleeding, and myasthenia gravis. The patient came to the hospital with dizziness, syncopal episode  And striking his head and face when he fell down, and found to have significant bradycardia with a pulse in the 20s.  CT scanning of his facial bones revealed an inferior orbital fracture as well as a possible left radial head fracture. The patient does have significant ecchymosis about the left eye. ppatient was seen by orthopedics and left radial head  Fracture was ruled out. Patient was seen by cardiology, and permanent pacemaker placement was recommended. He had his permanent pacemaker placed by cardiology on 5/7. Marbin Mcaiel was started therapies afterwards, but is weak overall, not safe to return to independent living. Roberto Michaud is now admitted to our rehabilitation unit to improve his strength, gait, balance, and safety before discharge. Patient states he has double vision from his myasthenia gravis, and needs to start on prednisone, which he uses to treat this problem at home. We'll start him on 60 mg of prednisone daily at this time. (copied per note 5/10/19)  Response to previous treatment: Patient with no complaints from previous session  Family / Caregiver Present: No  Referring Practitioner: Val  Diagnosis: debility  Subjective  Subjective: Pt seen in department. Pt with no c/o pain. Objective       Meal Prep  Meal Prep Level: Walker(Placed a tray as he uses a tray at home.)  Meal Prep Level of Assistance: Stand by assistance  Meal Preparation: Pt gathered a pot from the lower cabinet. He transported to the sink and filled half with water. Pt reports he makes kunal soup at home from a package that requires him to heat up water on the stove. He used the tray to transport to the stove. He was able to correctly choose the correct burner. He heated the water and was able to turn burner off after transport the pot to the sink using washcloths to protect the counter. Standing Balance  Time: 15 mintues  Activity: Pt completed standing in the kitchen without any rest break. Pt required cues to stay on task as he was easily distracted. Functional Mobility  Functional - Mobility Device: Rolling Walker  Activity: Other  Assist Level: Stand by assistance  Wheelchair Bed Transfers  Wheelchair/Bed - Technique: Ambulating  Equipment Used: Wheelchair; Other(chair with arms)  Level of Asssistance: Stand by assistance           Plan   Plan  Times per week: 5-6 days per week  Times per day: Twice a day  Plan weeks: 1 week pt will. .  Current Treatment Recommendations: Strengthening, Functional Mobility Training, Patient/Caregiver Education & Training, ROM, Endurance Training, Balance Training, Self-Care / ADL, Home Management Training, Equipment Evaluation, Education, & procurement, Safety Education & Training    Goals  Short term goals  Time Frame for Short term goals: 1 week  pt will. ..   Short term goal 1: bathe indep with AD for shower, no device sponge bath  Short term goal 2: dress indep  Short term goal 3: toilet indep with AD  Short term goal 4: transfer with AD as needed  Short term goal 5: functional mobility and simple meal prep indep with AD as needed  Long term goals  Time Frame for Long term goals : same as stg  Patient Goals   Patient goals : \"I want to get out of here in a week\"  \"I dont know, you tell me what I need to do\"  Agreed that he wants to be able to complete self care indep, ambulate indep, drive       Therapy Time   Individual Concurrent Group Co-treatment   Time In 0900         Time Out 0945         Minutes 1200 [facility], [address] [place] [address]

## 2019-05-14 NOTE — PROGRESS NOTES
Pt leaving Wednesday after all therapy is completed, Dr. Foster Dumont aware and in agreement. Call in from [place] [phone], they will see pt tomorrow prior to leaving the Rehab Unit. Electronically signed by Román Weston RN on 5/14/2019 at 3:10 PM    Patient and Daughter aware of above information.    Electronically signed by Román Weston RN on 5/14/2019 at 3:15 PM

## 2019-05-14 NOTE — PROGRESS NOTES
Occupational Therapy  OCCUPATIONAL THERAPY  Progress Note   Second Session    Patient Name: [id number] Wong Record Number: [id number]    Treatment Diagnosis: impaired functional mobility due to recent fall and pacemaker placement    General  Chart Reviewed: Yes  Additional Pertinent Hx: This patient is an 75-year-old white male with a history of atrial fibrillation, previous GI bleeding, and myasthenia gravis. The patient came to the hospital with dizziness, syncopal episode  And striking his head and face when he fell down, and found to have significant bradycardia with a pulse in the 20s.  CT scanning of his facial bones revealed an inferior orbital fracture as well as a possible left radial head fracture. The patient does have significant ecchymosis about the left eye. ppatient was seen by orthopedics and left radial head  Fracture was ruled out. Patient was seen by cardiology, and permanent pacemaker placement was recommended. He had his permanent pacemaker placed by cardiology on 5/7. Marbin Maciel was started therapies afterwards, but is weak overall, not safe to return to independent living. Roberto Michaud is now admitted to our rehabilitation unit to improve his strength, gait, balance, and safety before discharge. Patient states he has double vision from his myasthenia gravis, and needs to start on prednisone, which he uses to treat this problem at home. We'll start him on 60 mg of prednisone daily at this time.  (copied per note 5/10/19)  Response to previous treatment: Patient with no complaints from previous session  Family / Caregiver Present: No  Referring Practitioner: Heis  Diagnosis: debility     Restrictions/Precautions  Restrictions/Precautions: Weight Bearing  Implants present? : Pacemaker(5/7/19)     Upper Extremity Weight Bearing Restrictions  Left Upper Extremity Weight Bearing: Weight Bearing As Tolerated  Other: Ortho (Dr. Niles Batres at bedside 5/8/19) -consulted for possible L Radial Head Fx :  \"I do not think the patient has a Radial Head Fx\", no wgt bear or activity restrictions. Position Activity Restriction  Other position/activity restrictions: New Pacemaker placed 5/7/19 - L UE ROM Restrictions (Do not elevate affected arm above shoulder for 30 days. No showering for one week)    Subjective: pt met in dept, stated his daughter cant pick him up  On thurs, requesting to go weds after therapy    Objective:  IADL - community re education. Pt amb from elevators, used correct buttons, got on/off elevator indep. Amb to [employer] shop indep - tolerated being up at least 15 minutes without difficulty. Sat in chair with arms for rest break, then returned on elevator to wheelchair     UE ex - 2 lb dumbbell right shoulder and elbow ex . Left UE limited to 90 deg shoulder, elbow and below with 2 lb 15 reps each. Assessment:   Pt is safe with rolling walker, ambulates indep on/off elevator, good with navigation around obstacles  Pt is ready for discharge Weds after therapies if physician agrees as his daughter cannot pick him up on Thursday.   Will discuss in conference 5/15  Safety Device - Type of devices:  []  All fall risk precautions in place [] Bed alarm in place  [] Call light within reach [] Chair alarm in place [] Positioning belt [] Gait belt [] Patient at risk for falls [] Left in bed [] Left in chair [] Telesitter in use [] Sitter present [] Nurse notified []  None      Therapy Time   Individual Co-treatment   Time In 8265     Time Out 1600     Minutes 45       Electronically signed by Viv Beverly OT on 5/14/2019 at 3:21 PM

## 2019-05-15 ENCOUNTER — TELEPHONE (OUTPATIENT)
Dept: INTERNAL MEDICINE CLINIC | Age: 84
End: 2019-05-15

## 2019-05-15 VITALS
HEART RATE: 95 BPM | DIASTOLIC BLOOD PRESSURE: 86 MMHG | BODY MASS INDEX: 29.1 KG/M2 | HEIGHT: 67 IN | RESPIRATION RATE: 16 BRPM | OXYGEN SATURATION: 95 % | TEMPERATURE: 97.3 F | SYSTOLIC BLOOD PRESSURE: 132 MMHG | WEIGHT: 185.41 LBS

## 2019-05-15 LAB
GLUCOSE BLD-MCNC: 110 MG/DL (ref 70–99)
GLUCOSE BLD-MCNC: 141 MG/DL (ref 70–99)
PERFORMED ON: ABNORMAL
PERFORMED ON: ABNORMAL

## 2019-05-15 PROCEDURE — 6360000002 HC RX W HCPCS: Performed by: PHYSICAL MEDICINE & REHABILITATION

## 2019-05-15 PROCEDURE — 97535 SELF CARE MNGMENT TRAINING: CPT

## 2019-05-15 PROCEDURE — 97110 THERAPEUTIC EXERCISES: CPT

## 2019-05-15 PROCEDURE — G0009 ADMIN PNEUMOCOCCAL VACCINE: HCPCS | Performed by: PHYSICAL MEDICINE & REHABILITATION

## 2019-05-15 PROCEDURE — 6370000000 HC RX 637 (ALT 250 FOR IP): Performed by: INTERNAL MEDICINE

## 2019-05-15 PROCEDURE — 97530 THERAPEUTIC ACTIVITIES: CPT

## 2019-05-15 PROCEDURE — 6370000000 HC RX 637 (ALT 250 FOR IP): Performed by: PHYSICAL MEDICINE & REHABILITATION

## 2019-05-15 PROCEDURE — 97116 GAIT TRAINING THERAPY: CPT

## 2019-05-15 PROCEDURE — 90670 PCV13 VACCINE IM: CPT | Performed by: PHYSICAL MEDICINE & REHABILITATION

## 2019-05-15 PROCEDURE — 94760 N-INVAS EAR/PLS OXIMETRY 1: CPT

## 2019-05-15 PROCEDURE — 99233 SBSQ HOSP IP/OBS HIGH 50: CPT | Performed by: INTERNAL MEDICINE

## 2019-05-15 RX ORDER — AMLODIPINE BESYLATE 2.5 MG/1
2.5 TABLET ORAL DAILY
Qty: 30 TABLET | Refills: 3 | Status: SHIPPED | OUTPATIENT
Start: 2019-05-15 | End: 2019-09-11 | Stop reason: SDUPTHER

## 2019-05-15 RX ORDER — ATORVASTATIN CALCIUM 10 MG/1
TABLET, FILM COATED ORAL
Qty: 90 TABLET | Refills: 4 | Status: SHIPPED | OUTPATIENT
Start: 2019-05-15 | End: 2020-01-29 | Stop reason: SDUPTHER

## 2019-05-15 RX ORDER — PREDNISONE 20 MG/1
60 TABLET ORAL DAILY
Qty: 30 TABLET | Refills: 0
Start: 2019-05-15 | End: 2019-05-25

## 2019-05-15 RX ADMIN — APIXABAN 2.5 MG: 2.5 TABLET, FILM COATED ORAL at 09:07

## 2019-05-15 RX ADMIN — PNEUMOCOCCAL 13-VALENT CONJUGATE VACCINE 0.5 ML: 2.2; 2.2; 2.2; 2.2; 2.2; 4.4; 2.2; 2.2; 2.2; 2.2; 2.2; 2.2; 2.2 INJECTION, SUSPENSION INTRAMUSCULAR at 09:07

## 2019-05-15 RX ADMIN — ATORVASTATIN CALCIUM 10 MG: 10 TABLET, FILM COATED ORAL at 09:06

## 2019-05-15 RX ADMIN — AMLODIPINE BESYLATE 2.5 MG: 5 TABLET ORAL at 09:07

## 2019-05-15 RX ADMIN — SENNOSIDES, DOCUSATE SODIUM 2 TABLET: 50; 8.6 TABLET, FILM COATED ORAL at 09:07

## 2019-05-15 RX ADMIN — INSULIN LISPRO 1 UNITS: 100 INJECTION, SOLUTION INTRAVENOUS; SUBCUTANEOUS at 09:09

## 2019-05-15 RX ADMIN — PREDNISONE 60 MG: 20 TABLET ORAL at 09:07

## 2019-05-15 RX ADMIN — PANTOPRAZOLE SODIUM 40 MG: 40 TABLET, DELAYED RELEASE ORAL at 06:30

## 2019-05-15 RX ADMIN — METOPROLOL SUCCINATE 25 MG: 25 TABLET, EXTENDED RELEASE ORAL at 09:06

## 2019-05-15 ASSESSMENT — PAIN DESCRIPTION - DESCRIPTORS: DESCRIPTORS: ACHING

## 2019-05-15 ASSESSMENT — ENCOUNTER SYMPTOMS
FACIAL SWELLING: 0
RESPIRATORY NEGATIVE: 1

## 2019-05-15 ASSESSMENT — PAIN - FUNCTIONAL ASSESSMENT: PAIN_FUNCTIONAL_ASSESSMENT: ACTIVITIES ARE NOT PREVENTED

## 2019-05-15 ASSESSMENT — PAIN DESCRIPTION - ORIENTATION: ORIENTATION: RIGHT;LEFT

## 2019-05-15 ASSESSMENT — PAIN SCALES - GENERAL: PAINLEVEL_OUTOF10: 1

## 2019-05-15 ASSESSMENT — PAIN DESCRIPTION - FREQUENCY: FREQUENCY: CONTINUOUS

## 2019-05-15 ASSESSMENT — PAIN DESCRIPTION - PROGRESSION: CLINICAL_PROGRESSION: NOT CHANGED

## 2019-05-15 ASSESSMENT — PAIN DESCRIPTION - LOCATION: LOCATION: HAND

## 2019-05-15 ASSESSMENT — PAIN DESCRIPTION - ONSET: ONSET: ON-GOING

## 2019-05-15 NOTE — CARE COORDINATION
Team Conference held today. Team reports she is working in both therapies. She does have limited Right upper extremity strength from previous issue. Team recommends continued stay on rehab to further her progress and DC is planned for Wed 5-  Team recommends home care for SN/PT/OT. Team recommends initiate 24 hour care in the home to get her used to being home alone. LSW met with pateint and son, Hilda Abraham to review. He will be able to stay with patient upon her discharge to home. He has installed cameras at home to assist with safety when he returns home to [place]. Discussion held regarding home care orders; son will review list and choose agency. No concerns for DC at this time. She will need [facility] walker for her discharge to home.   Agency, [place]     Case Management   [phone]    5/15/2019  3:13 PM

## 2019-05-15 NOTE — DISCHARGE SUMMARY
descending R or L LE; able to safely manage wheeled walker with curb  Mobility: Bed, Chair, Wheel Chair: 5 - Requires setup/supervision/cues  Walk: 5 - Exception Household Locomotion Walks at least 50 feet Independently with or without a device May require an extra amount of time OR there may be a concern for safety  Distance Walked: 200'  Stairs: 5 - Exception HouseHold Ambulation Goes up and down 4 to 6 stairs independently, w or w/o a device. The activity takes more than a reasonable amount of time, or there are safety considerations, PT Equipment Recommendations  Equipment Needed: No  Other: Will monitor for potential equipt needs. - owns wheeled walker, Assessment: Patient has met  short term goals and is anticipating discharge to independent living on Wednesday, 5/15/19, after all with no further therapy recommended. Presently, patient requires supervision for transfers, ambulated with wheeled walker with supervision community distances and performed 12 steps with B rails with SBA. Patient would continue to benefit from inpatient rehab to improve overall independence with mobility with or without device to return home to independent facility. Anticipate D/C Wednesday, 5/15/19, after PM therapy sessions, no continued therapy recommended or required at discharge. Occupational therapy: Eatin - Feeds self with adaptive equipment/dentures and/or feeds self with modified diet and/or performs own tube feeding  Toiletin - Patient independent with all 3 tasks  Toilet Transfer: 4 - Requires steadying assistance only < 25% assist  Shower Transfer: 0 - Activity does not occur,  , Assessment: Pt tolerated session well. Pt agreed to kitchen task. Pt completed with SBa and demonstrates good safety awareness in the kitchen. Pt completed transfers with SBA.     Speech therapy:  Comprehension: 7 - Patient understands complex ideas (math/planning)  Expression: 7 - Patient expresses complex ideas/needs  Social Interaction: 6 - Patient requires medication for mood and/or effect  Problem Solvin - Independent with device (e.g. notes, schedules)  Memory: 6 - Patient requires device to recall (e.g. memory book)      Inpatient Rehabilitation Course:   Giovany Webber is a 80 y.o. male admitted to inpatient rehabilitation on 2019 for s/p Syncope, cardiogenic. The patient participated in an aggressive multidisciplinary inpatient rehabilitation program involving 3 hours per day, 5 days per week of rehabilitation. 79-year-old white male with a history of atrial fibrillation, previous GI bleeding, and myasthenia gravis. The patient came to the hospital with dizziness, syncopal episode  And striking his head and face when he fell down, and found to have significant bradycardia with a pulse in the 20s.  CT scanning of his facial bones revealed an inferior orbital fracture as well as a possible left radial head fracture. The patient does have significant ecchymosis about the left eye. ppatient was seen by orthopedics and left radial head  Fracture was ruled out. Patient was seen by cardiology, and permanent pacemaker placement was recommended. He had his permanent pacemaker placed by cardiology on . Marilyn Moore was started therapies afterwards, but is weak overall, not safe to return to independent living. Tonia Craig is now admitted to our rehabilitation unit to improve his strength, gait, balance, and safety before discharge. Patient states he has double vision from his myasthenia gravis, and needs to start on prednisone, which he uses to treat this problem at home. We'll start him on 60 mg of prednisone daily at this time. After admission to the Rehab Unit, he made steady progress in his therapies as listed above under each therapy section. His upper and lower extremity coordination and strength did improve in therapy, and he was starting to improve with his endurance and functional status as he  participated in his rehab therapies. His blood sugars, labs and blood pressure were monitored while on the rehabilitation unit. [facility] Team of PT, OT, Speech, Dietary, Nursing, and Social Service about the progress made in Rehab Unit therapies, and when patient will be ready for discharge. We think patient will be ready for discharge to home today with home visiting nurse ordered for him for medication management and pacemaker site healing. Patient was discussed with Dr. Ricardo Sen this morning. He will follow-up with Dr. Ricardo Sen after discharge. Dr. Ricardo Sen filled out his discharge meds for him today.        Condition at Discharge: Good    Significant Diagnostics:   Lab Results   Component Value Date     05/13/2019    K 4.2 05/13/2019     05/13/2019    BUN 33 (H) 05/13/2019    CREATININE 1.2 05/13/2019    GLUCOSE 131 (H) 05/13/2019    CALCIUM 8.5 05/13/2019     Lab Results   Component Value Date    WBC 5.5 05/11/2019    RBC 4.00 (L) 05/11/2019    HGB 11.5 (L) 05/11/2019    HCT 34.9 (L) 05/11/2019    MCV 87.5 05/11/2019    MCH 28.7 05/11/2019    MCHC 32.9 05/11/2019    RDW 15.8 (H) 05/11/2019     (L) 05/11/2019    MPV 8.8 05/11/2019     Lab Results   Component Value Date    PROTIME 10.3 09/10/2014    INR 0.96 09/10/2014     Lab Results   Component Value Date    APTT 24.3 04/20/2013     Lab Results   Component Value Date    COLORU YELLOW 05/07/2019    CLARITYU Clear 05/07/2019    GLUCOSEU Negative 05/07/2019    BILIRUBINUR Negative 05/07/2019    KETUA TRACE (A) 05/07/2019    SPECGRAV 1.016 05/07/2019    BLOODU Negative 05/07/2019    PHUR 6.0 05/07/2019    PROTEINU 30 (A) 05/07/2019    UROBILINOGEN 1.0 05/07/2019    NITRU Negative 05/07/2019    LEUKOCYTESUR TRACE (A) 05/07/2019    LABMICR YES 05/07/2019    URRFLXCULT Yes 05/07/2019    URINETYPE Not Specified 05/07/2019     Lab Results   Component Value Date    MUCUS Trace 04/11/2013    WBCUA 2 05/07/2019    EPIU 1 05/07/2019 BACTERIA Rare 04/11/2013     Lab Results   Component Value Date    LABURIN  05/07/2019     <10,000 CFU/ml mixed skin/urogenital suri. No further workup       Xr Elbow Left (min 3 Views)    Result Date: 5/7/2019  EXAMINATION: 3 XRAY VIEWS OF THE LEFT ELBOW 5/7/2019 6:24 am COMPARISON: None. HISTORY: ORDERING SYSTEM PROVIDED HISTORY: left elbow injury TECHNOLOGIST PROVIDED HISTORY: Reason for exam:->left elbow injury Ordering Physician Provided Reason for Exam: pt fell, skin broken posterior elbow. IV  present Acuity: Acute Type of Exam: Initial Relevant Medical/Surgical History: arthritis FINDINGS: There is slight prominence of the anterior fat pad without a typical sail sign. The posterior fat pad is not visible. There is a questionable cortical defect in the radial head, only seen on one view. Spurring and ossicles are identified along the medial joint margin. There is soft tissue swelling medially. Possible nondisplaced radial head fracture. Recommend conservative therapy with repeat radiographs in 8-10 days. Xr Hand Left (min 3 Views)    Result Date: 5/7/2019  EXAMINATION: 3 XRAY VIEWS OF THE LEFT HAND 5/7/2019 6:24 am COMPARISON: None. HISTORY: ORDERING SYSTEM PROVIDED HISTORY: left hand injury TECHNOLOGIST PROVIDED HISTORY: Reason for exam:->left hand injury Ordering Physician Provided Reason for Exam: pt fell, pain in proximal fingers Acuity: Acute Type of Exam: Initial Mechanism of Injury: pt's 5th digit is fixed in flexion following a recent surgery Relevant Medical/Surgical History: arthritis FINDINGS: There is fixed flexion of the little finger. No definite fracture is identified. Joint space alignment is normal.  The soft tissues are unremarkable. No fracture or malalignment. Xr Hand Right (min 3 Views)    Result Date: 5/7/2019  EXAMINATION: 3 XRAY VIEWS OF THE RIGHT HAND 5/7/2019 6:37 am COMPARISON: None.  HISTORY: ORDERING SYSTEM PROVIDED HISTORY: right hand injury TECHNOLOGIST PROVIDED HISTORY: Reason for exam:->right hand injury Ordering Physician Provided Reason for Exam: fell, pain in proximal fingers, 1st and 2nd digits, primarily Acuity: Acute Type of Exam: Initial Relevant Medical/Surgical History: arthritis FINDINGS: There is no acute fracture or dislocation. 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 Additional mild degenerative changes are noted at multiple DIP and PIP joints. Subcentimeter well corticated ossicle at the 1st IP joint is likely on the basis of prior trauma. There are no radiopaque foreign bodies. 1. No acute fracture or dislocation. 2. Degenerative changes as described in body of report. Xr Knee Left (1-2 Views)    Result Date: 5/7/2019  EXAMINATION: 2 XRAY VIEWS OF THE LEFT KNEE 5/7/2019 6:24 am COMPARISON: None. HISTORY: ORDERING SYSTEM PROVIDED HISTORY: left knee pain TECHNOLOGIST PROVIDED HISTORY: Reason for exam:->left knee pain Ordering Physician Provided Reason for Exam: pt fell, broken skin at patella, knee pain Acuity: Acute Type of Exam: Initial Relevant Medical/Surgical History: arthritis FINDINGS: There is no fracture or malalignment. There are no significant degenerative changes. There is a trace joint effusion. No fracture or malalignment. Ct Head Wo Contrast    Result Date: 5/7/2019  EXAMINATION: CT OF THE HEAD WITHOUT CONTRAST  5/7/2019 5:59 am TECHNIQUE: CT of the head was performed without the administration of intravenous contrast. Dose modulation, iterative reconstruction, and/or weight based adjustment of the mA/kV was utilized to reduce the radiation dose to as low as reasonably achievable. COMPARISON: CT head 09/10/2014. HISTORY: ORDERING SYSTEM PROVIDED HISTORY: SYNCOPE/FAINTING TECHNOLOGIST PROVIDED HISTORY: Has a \"code stroke\" or \"stroke alert\" been called? ->No Ordering Physician Provided Reason for Exam: fall facial injury Acuity: Acute Type of Exam: Initial FINDINGS: BRAIN/VENTRICLES: There is no intracranial opacification the right maxillary sinus. Near complete opacification left maxillary sinus. SOFT TISSUES:  Soft tissue swelling along the left frontal skull. Left inferior orbital wall fractures above. Near complete opacification left maxillary sinus with tiny foci of gas. Ct Cervical Spine Wo Contrast    Result Date: 5/7/2019  EXAMINATION: CT OF THE CERVICAL SPINE WITHOUT CONTRAST 5/7/2019 7:20 am TECHNIQUE: CT of the cervical spine was performed without the administration of intravenous contrast. Multiplanar reformatted images are provided for review. Dose modulation, iterative reconstruction, and/or weight based adjustment of the mA/kV was utilized to reduce the radiation dose to as low as reasonably achievable. COMPARISON: CT head and CT of the maxillofacial bones 05/07/2019, CT neck 09/10/2014. HISTORY: ORDERING SYSTEM PROVIDED HISTORY: head injury TECHNOLOGIST PROVIDED HISTORY: If patient is on cardiac monitor and/or pulse ox, they may be taken off cardiac monitor and pulse ox, left on O2 if currently on. All monitors reattached when patient returns to room. Ordering Physician Provided Reason for Exam: fell facial injury Acuity: Acute Type of Exam: Initial FINDINGS: BONES/ALIGNMENT: There is no acute fracture or traumatic listhesis. There are multilevel degenerative changes characterized by varying degrees of disc space narrowing and osteophytosis. There is approximately 2-3 mm of anterolisthesis of C on C3 which is on a degenerative basis. The overall degree of degenerative changes most pronounced from C3-C4 through C6-C7 where there are moderate to severe multilevel degenerative changes. There is near complete bony ankylosis of the C3-C4 level. The dens is intact. There is no predental space widening. The lateral masses of C1 are well aligned on the C2 vertebral body. SOFT TISSUES: There is no prevertebral soft tissue swelling.   1.1 cm hypodense nodule noted in the inferior right thyroid lobe, no recommendation for follow-up as per best practice guidelines. 1 cm hyperdense lesion in the left parotid gland likely represents an intraparotid lymph node. 1. No acute fracture or traumatic listhesis. 2. Advanced multilevel degenerative changes as described in body of report. Xr Chest Portable    Result Date: 5/7/2019  EXAMINATION: SINGLE XRAY VIEW OF THE CHEST 5/7/2019 5:14 pm COMPARISON: Prior studies including most recent 05/07/2013 at 6:23 a.m. HISTORY: ORDERING SYSTEM PROVIDED HISTORY: Pacemaker placement and rule out pneumothorax TECHNOLOGIST PROVIDED HISTORY: Reason for exam:->Pacemaker placement and rule out pneumothorax Ordering Physician Provided Reason for Exam: Pacemaker placement and rule out pneumothorax Acuity: Acute Type of Exam: Initial FINDINGS: A left subclavian cardiac device is noted. The patient is rotated toward the right. The heart size is stable. The lungs are clear. No pneumothorax is evident. Elevation of right hemidiaphragm is unchanged. Interval placement of left subclavian cardiac device. No pneumothorax. Xr Chest Portable    Result Date: 5/7/2019  EXAMINATION: SINGLE XRAY VIEW OF THE CHEST 5/7/2019 5:59 am COMPARISON: 05/01/2018 HISTORY: ORDERING SYSTEM PROVIDED HISTORY: fall TECHNOLOGIST PROVIDED HISTORY: Reason for exam:->fall Ordering Physician Provided Reason for Exam: low heartrate, fall FINDINGS: Elevated right hemidiaphragm is again seen with some adjacent atelectasis. The lungs are otherwise clear. The heart size is at the upper limits of normal.  There is no discernible pneumothorax. No acute disease. Patient Instructions: Follow-up visits: He will follow-up with Dr. Alice Flood after discharge.     Discharge Medications:     Medication List      START taking these medications    amLODIPine 2.5 MG tablet  Commonly known as:  NORVASC  Take 1 tablet by mouth daily     apixaban 2.5 MG Tabs tablet  Commonly known as:  ELIQUIS  Take 1 tablet by mouth 2 times daily     predniSONE 20 MG tablet  Commonly known as:  DELTASONE  Take 3 tablets by mouth daily for 10 days        CONTINUE taking these medications    atorvastatin 10 MG tablet  Commonly known as:  LIPITOR  TAKE ONE TABLET BY MOUTH DAILY     doxazosin 4 MG tablet  Commonly known as:  CARDURA     metoprolol succinate 25 MG extended release tablet  Commonly known as:  TOPROL XL  TAKE ONE TABLET BY MOUTH DAILY     pantoprazole 40 MG tablet  Commonly known as:  PROTONIX        STOP taking these medications    enoxaparin 30 MG/0.3ML injection  Commonly known as:  LOVENOX     HYDROcodone-acetaminophen 5-325 MG per tablet  Commonly known as:  NORCO     insulin lispro 100 UNIT/ML injection vial  Commonly known as:  HUMALOG           Where to Get Your Medications      These medications were sent to [address] [place] [address], [address] [facility]  [address] [facility], [address] [place] [address]    Phone:  [phone]   · amLODIPine 2.5 MG tablet  · apixaban 2.5 MG Tabs tablet     Information about where to get these medications is not yet available    Ask your nurse or doctor about these medications  · predniSONE 20 MG tablet            I spent over 35 minutes on this discharge encounter between counseling, coordination of care, and medication reconciliation.         To comply with [facility] bylaw R.II.4.1:   Discharge order placed in advance to facilitate patients discharge needs      Иван Baca MD, 5/15/2019, 10:25 AM

## 2019-05-15 NOTE — PROGRESS NOTES
Occupational Therapy  Occupational Therapy  Discharge Summary     Name:Chris Lynn  DDP:[id number]  :12/15/1929  Treatment Diagnosis: impaired functional mobility due to recent fall and pacemaker placement  Diagnosis:  S/P Pacemaker 19. initial fall at home, syncope    Restrictions/Precautions  Restrictions/Precautions: Weight Bearing  Implants present? : Pacemaker       Upper Extremity Weight Bearing Restrictions  Left Upper Extremity Weight Bearing: Weight Bearing As Tolerated  Other: Ortho (Dr. Lima Allen at bedside 19) -consulted for possible L Radial Head Fx :  \"I do not think the patient has a Radial Head Fx\", no wgt bear or activity restrictions. Position Activity Restriction  Other position/activity restrictions: New Pacemaker placed 19 - L UE ROM Restrictions (Do not elevate affected arm above shoulder for 30 days. No showering for one week)     Goals:   Short term goals  Time Frame for Short term goals: 1 week  pt will. .. Short term goal 1: bathe indep with AD for shower, no device sponge bath Goal Met  Short term goal 2: dress indep Goal Met  Short term goal 3: toilet indep with AD Goal Met  Short term goal 4: transfer with AD as needed Goal Met  Short term goal 5: functional mobility and simple meal prep indep with AD as needed Goal Met   Long term goals  Time Frame for Long term goals : same as stg    Pt. Met 5/5 short term goals Current Functional Status:   ADL  Grooming: Independent  UE Bathing: Independent  LE Bathing: Independent(sponge bath at sink. Stands for most, sits to bathe feet)  UE Dressing: Independent  LE Dressing: Independent  Toileting: Modified independent (grab bar for balance)      Bed mobility  Bridging: Independent  Rolling to Left: Independent  Rolling to Right: Independent  Supine to Sit: Independent  Sit to Supine: Independent  Scooting: Independent  Comment: self-limits use of L UE due to recent pacemaker    Functional Transfers:   Toilet Transfers  Toilet - Technique: Ambulating  Equipment Used: Grab bars  Toilet Transfer: Modified independent           Shower Transfers  Shower Transfers: Not tested  [employer] Transfers Comments: declined    Car Transfers  Car - Transfer From: Melissa Swain - Technique: Ambulating  Car Transfers: Modified independence        Functional Mobility  Functional - Mobility Device: Rolling Walker  Activity: Retrieve items, Transport items, To/from bathroom  Assist Level: Modified independent   Functional Mobility Comments: Pt safe for room indep. Discussed with nurse, charge nurse and PCA, PT    Instrumental ADL's  Instrumental ADLs: Yes  Meal Prep  Meal Prep Level: Walker(Placed a tray as he uses a tray at home.)  Meal Prep Level of Assistance: Stand by assistance  Meal Preparation: Pt gathered a pot from the lower cabinet. He transported to the sink and filled half with water. Pt reports he makes kunal soup at home from a package that requires him to heat up water on the stove. He used the tray to transport to the stove. He was able to correctly choose the correct burner. He heated the water and was able to turn burner off after transport the pot to the sink using washcloths to protect the counter. Completed mobility in kitchen on discharge - indep with mobility, did not complete full kitchen task again. Perception  Overall Perceptual Status: WFL         UE Function:   limited by pacemaker precautions LUE               Assessment:   Assessment: Pt completed sponge bath at sink, gathered clothing, dressed indep, toileted indep. He transfers and amb indep with rolling walker, is safe for room indep. He adheres to his pacemaker precautions without cues. No DME is recommended and no further OT is recommended. Discharge today to his indep living apt.   Prognosis: Good     REQUIRES OT FOLLOW UP: No(except one session this PM prior to discharge)  Discharge Recommendations: Home with assist PRN    Equipment Recommendations:  none             Electronically signed by Marilynn Shah OT on 5/15/2019 at 2:18 PM

## 2019-05-15 NOTE — PROGRESS NOTES
Occupational Therapy  Facility/Department: [address] REHAB  Daily Treatment Note  NAME: Alfred Rey  : 12/15/1929  MRN: [id number]    Date of Service: 5/15/2019    Discharge Recommendations:  Home with assist PRN  OT Equipment Recommendations  Equipment Needed: No    Assessment   Assessment: Pt completed sponge bath at sink, gathered clothing, dressed indep, toileted indep. He transfers and amb indep with rolling walker, is safe for room indep. He adheres to his pacemaker precautions without cues. No DME is recommended and no further OT is recommended. Discharge today to his indep living apt. Prognosis: Good  REQUIRES OT FOLLOW UP: No(except one session this PM prior to discharge)  Activity Tolerance  Activity Tolerance: Patient Tolerated treatment well  Safety Devices  Safety Devices in place: Not Applicable(pt safe for room indep)              has a past medical history of Arthritis, Hyperlipidemia, Hypertension, Myasthenia gravis, and Polycystic kidney. has a past surgical history that includes joint replacement; back surgery; Tonsillectomy; Lithotripsy; orthopedic surgery; Cholecystectomy; other surgical history; Cataract removal with implant (Right, 13); Cataract removal with implant (Left, 3/22/13); Hand surgery; Small intestine surgery (13); Colon surgery; fracture surgery; Upper gastrointestinal endoscopy (9/10/14); Upper gastrointestinal endoscopy (14); and Upper gastrointestinal endoscopy (2018). Restrictions  Restrictions/Precautions  Restrictions/Precautions: Weight Bearing  Implants present? : Pacemaker(19)  Upper Extremity Weight Bearing Restrictions  Left Upper Extremity Weight Bearing: Weight Bearing As Tolerated  Other: Ortho (Dr. Zeina Fallon at bedside 19) -consulted for possible L Radial Head Fx :  \"I do not think the patient has a Radial Head Fx\", no wgt bear or activity restrictions.    Position Activity Restriction  Other position/activity restrictions: shower, no device sponge bath Goal Met  Short term goal 2: dress indep Goal Met  Short term goal 3: toilet indep with AD Goal Met  Short term goal 4: transfer with AD as needed Goal Met  Short term goal 5: functional mobility and simple meal prep indep with AD as needed Goal Met  Long term goals  Time Frame for Long term goals : same as stg  Patient Goals   Patient goals : \"I want to get out of here in a week\"  \"I dont know, you tell me what I need to do\"  Agreed that he wants to be able to complete self care indep, ambulate indep, drive       Therapy Time   Individual Concurrent Group Co-treatment   Time In 1015         Time Out 1100         Minutes 45         Timed Code Treatment Minutes: 45 Minutes     Therapy Time     Individual Co-treatment   Time In 1345     Time Out 1430     Minutes 305 [facility], OTR/L 770

## 2019-05-15 NOTE — PROGRESS NOTES
Good  Sitting - Dynamic: Good  Standing - Static: Good  Standing - Dynamic: Fair, +  Comments: Unsteady dynamic balance L- need wheeled walker at this time       Patient Education: safety with functional mobility, pacemaker precautions       Safety Devices: Type of devices: All fall risk precautions in place, Gait belt(room independent with walker)      Assessment: Patient has met 5/5 short term goals and is returning home today with no further therapy at home. Today he was independent with all ambulation/transfers and was able to ascend/descend 12 steps with len rails and supervision. He does continue to have a balance impairment that is most plainly observed when he is standing with no UE support and either narrow base of support or eyes closed; however, he is steady and safe ambulating with his walker. He appears safe for independent function in his independent living apartment.            Discharge Recommendations: Home independently(denies need for home therapies)  PT Equipment Recommendations  Equipment Needed: No  Other: Pt owns wheeled walker      Goals:  Time Frame for Short term goals: 5-7 days  Short term goal 1: Bed Mob Independent. - met - 5/14/19   Short term goal 2: Transfers with assist device  modified Independent. - met - 5/14/19   Short term goal 3: Amb with assist device 200' modified Independent. - met - 5/14/19  Short term goal 4: ascend /descend curb step with wheeled walker with supervision - met - 5/14/19  Short term goal 5: ascend/descend 12 steps with B rails with SBA - met - 5/14/19    Long term goals  Time Frame for Long term goals : STG=LTG      Plan:  Times per week: 5-6x week; Times per day: Twice a day  Current Treatment Recommendations: Balance Training, Functional Mobility Training, Transfer Training, Gait Training, Safety Education & Training, Patient/Caregiver Education & Training, ADL/Self-care Training, Endurance Training, Stair training, [employer], Equipment Evaluation, Education, & procurement, Strengthening          Timed Code Treatment Minutes: 45 Minutes         Therapy Time    Individual Concurrent Group Co-treatment   Time In 1576            Time Out 1200          Minutes 45            Timed Code Treatment Minutes: 45 Minutes        Electronically signed by Taylor Rangel PT on 5/15/2019 at 11:56 AM

## 2019-05-15 NOTE — CARE COORDINATION
SOCIAL WORK DISCHARGE SUMMARY:      DISCHARGE DATE:                 wEDN 5-      DISCHARGE PLACE:                HOME TO INDEPENDENT LIVING AT [facility] AGENCY:            PT REFUSED HOME CARE RN VISIT               TRANSPORTATION:                DAUGHTER             TIME:                                    PREFERRED PHARMACY:     PERSONAL PHARMACY EXCEPT FOR ONE MICHELLE POWELL md ORDER:  SN     HE REFUSED. Dme:   NONE.       Desmond [place]     Case Management   [phone]    5/15/2019  1:33 PM

## 2019-05-15 NOTE — PROGRESS NOTES
Patient admitted s/p Pacemaker placement-Patient oriented to person, place, time. Vital signs stable. Patient remains on room air only.  Patients currently experiencing a level 1 pain to hands bilat. Shift assessment completed. Medication administration completed without any complications. Patient is up with activity times 2 with a walker. Requiring Minimal contact assistance. Patients tolerating Low Na+ 2 GM diet. No needs voiced at this time. Call light within reach. Plan for discharge today after therapy completed- Will continue to monitor.

## 2019-05-15 NOTE — PROGRESS NOTES
Department of Sherry Peralta Progress Note    Patient Identification:  Comfort Wynn  [id number]  : 12/15/1929  Admit date: 2019      Diagnosis:   Patient Active Problem List   Diagnosis    [facility]ia grav[facility] ([facility]    Polycystic kidney    Hypertension    Hyperlipidemia    Arthritis    Hyperkalemia    Acute renal failure (HCC)    GI bleeding    Hypotension    History of esophageal stricture    RBBB (right bundle branch block with left anterior fascicular block)    Acute blood loss anemia    Duodenal ulcer    KENNY [facility]) ([facility]    Acute upper GI bleed    UGI bleed    History of GI bleed    Pneumonia of left lower lobe due to infectious organism (HCC)    Pleural effusion    Elevated hemidiaphragm    Acute respiratory failure with hypoxia (HCC)    NSVT (nonsustained ventricular tachycardia) ([facility])    Syncope and collapse    Atrial fibrillation with slow ventricular response (HCC)    Closed fracture of orbital wall ([facility])    Stage 3 chronic kidney disease (HCC)    Hyperglycemia    Neutropenia (HCC)    S/P placement of cardiac pacemaker    Syncope, cardiogenic    Cardiac pacemaker recipient           Subjective: Pt seen this AM. Patient will be discussed this AM in Rehab Conference with entire Rehab Team of PT, OT, Speech, Dietary, Nursing, and Social Service about the progress made in Rehab Unit therapies, and when patient will be ready for discharge. We think patient will be ready for discharge to home today with home visiting nurse ordered for him for medication management and pacemaker site healing. Patient was discussed with Dr. Demarco Hernandez this morning. He will follow-up with Dr. Demarco Hernandez after discharge.      BP (!) 144/65   Pulse 81   Temp 97.4 °F (36.3 °C) (Oral)   Resp 16   Ht 5' 6.93\" (1.7 m)   Wt 185 lb 6.5 oz (84.1 kg)   SpO2 93%   BMI 29.10 kg/m²     Last 24 hour lab  Recent Results (from the past 24 hour(s))   POCT Glucose Collection Time: 05/14/19 11:51 AM   Result Value Ref Range    POC Glucose 176 (H) 70 - 99 mg/dl    Performed on ACCU-CHEK    POCT Glucose    Collection Time: 05/14/19  4:48 PM   Result Value Ref Range    POC Glucose 187 (H) 70 - 99 mg/dl    Performed on ACCU-CHEK    POCT Glucose    Collection Time: 05/14/19  8:49 PM   Result Value Ref Range    POC Glucose 232 (H) 70 - 99 mg/dl    Performed on ACCU-CHEK        Therapy progress:  PT  Upper Extremity Weight Bearing Restrictions  Left Upper Extremity Weight Bearing: Weight Bearing As Tolerated  Other: Ortho (Dr. Benita Fountain at bedside 5/8/19) -consulted for possible L Radial Head Fx :  \"I do not think the patient has a Radial Head Fx\", no wgt bear or activity restrictions. Position Activity Restriction  Other position/activity restrictions: New Pacemaker placed 5/7/19 - L UE ROM Restrictions (Do not elevate affected arm above shoulder for 30 days. No showering for one week)  Objective     Sit to Stand: Modified independent  Stand to sit: Modified independent  Bed to Chair: Modified independent  Device: Rolling Walker  Assistance: Modified Independent  Distance: 200' x 2  OT  PT Equipment Recommendations  Equipment Needed: No  Other: Will monitor for potential equipt needs. - owns wheeled walker  Toilet - Technique: Ambulating  Equipment Used: Raised toilet seat with rails  Toilet Transfers Comments: has handicap height commode and grab bars at home. CGA/SBA                Assessment and Plan:      Cardiogenic syncope,  Atrial fibrillation with slow ventricular response- Permanent pacemaker placement      Closed fracture of the left inferior orbital ridge- Monitor      Ocular myasthenia gravis- Prednisone      History of GI bleed- Protonix      Stage III chronic kidney disease- monitor      Hypertension - Toprol, Cardura      Hyperlipidemia- Lipitor     Bowels: Schedule Miralax + Senna S. Follow bowel movements. Enema or suppository if needed.      Bladder: Check PVR x 3. [address] if PVR > 200ml or if any volume is > 500 ml.      Pain: Tylenol is ordered prn.        Annette Manzano MD, 5/15/2019, 7:27 AM

## 2019-05-15 NOTE — PROGRESS NOTES
RN came to patient room and patient stated that he had to go to the bathroom. Stated that he was already dribbling. When asked why he did not call for assistance, he stated that he knew we would be in soon. Encouraged patient to use call light at any time.

## 2019-05-15 NOTE — PROGRESS NOTES
Data- discharge order received, pt verbalized agreement to discharge, disposition to previous residence, no needs for HHC/DME. Action- discharge instructions prepared and given to patient, pt verbalized understanding. Medication information packet given r/t NEW and/or CHANGED prescriptions emphasizing name/purpose/side effects, pt verbalized understanding. Discharge instruction summary: Diet- General, Activity- up with walker, Primary Care Physician as follows: Spencer Whitney -[phone] f/u appointment made, immunizations reviewed and updated, prescription Eliquis filled by retail pharmacy and delivered. Cheri Armenta Response- Pt belongings gathered. Disposition is home (pt. Refused  [place] nurse), transported with belongings, taken to lobby via w/c, no complications. Dtr.  Nina Dempsey here to pick pt. up.

## 2019-05-15 NOTE — TELEPHONE ENCOUNTER
Marty Cochran provider had currently seen the pt at [facility] and provider stated he would refer the pt to ENT with UC.  Marty Cochran did not any records of the referral.        Thanks

## 2019-05-15 NOTE — TELEPHONE ENCOUNTER
No ENT saw the patient at [facility].  I made arrangements for him to be seen by ENT tomorrow morning at [address] [place] [address]. I discussed this in detail with his daughter.

## 2019-05-15 NOTE — PROGRESS NOTES
Patient resting comfortably this shift. Sleeping, respirations easy and unlabored. Denies pain this shift. Patient transfers SBA with rolling walker. Gait steady. Continent of bowel and bladder. All safety precautions in place. Will continue to monitor.

## 2019-05-15 NOTE — DISCHARGE INSTR - COC
Continuity of Care Form    Patient Name: Zora Hill   :  12/15/1929  MRN:  [id number]    Admit date:  2019  Discharge date: 5/15/19    Code Status Order: Full Code   Advance Directives:   [address] Directive Type of Healthcare Directive Copy in [address] Agent's Name Healthcare Agent's Phone Number    19 1806  --  --  -- media copy from  no name listed  --  --  --    19 1632  Yes, patient has an advance directive for healthcare treatment  Durable power of  for health care; Health care treatment directive  No, copy requested from family family states brought in (acute)  --  --  --    19 1533  Yes, patient has an advance directive for healthcare treatment  --  --  --  --  --          Admitting Physician:  Sole Baker MD  PCP: Joby Burkett MD    Discharging Nurse: [facility] Unit/Room#: S3X-0591/7957-54  Discharging Unit Phone Number: [phone]    Emergency Contact:   Extended Emergency Contact Information  Primary Emergency Contact: My [employer]  [place] Phone: [phone]  Mobile Phone: [phone]  Relation: Child    Past Surgical History:  Past Surgical History:   Procedure Laterality Date    BACK SURGERY      CATARACT REMOVAL WITH IMPLANT Right 13    CATARACT REMOVAL WITH IMPLANT Left 3/22/13    CHOLECYSTECTOMY      COLON SURGERY      FRACTURE SURGERY      HAND SURGERY      JOINT REPLACEMENT      bilateral hip replacement    LITHOTRIPSY      ORTHOPEDIC SURGERY      right elbow & left hand & club feet    OTHER SURGICAL HISTORY      removal mediport    SMALL INTESTINE SURGERY  13    EXPLORATORY LAPAROTOMY; SMALL BOWEL RESECTION; LYSIS OF ADHESIONS;    TONSILLECTOMY      UPPER GASTROINTESTINAL ENDOSCOPY  9/10/14    with dilatation    UPPER GASTROINTESTINAL ENDOSCOPY  14    EGD with dilitation    UPPER GASTROINTESTINAL ENDOSCOPY 04/18/2018    EGD with duodenal ulcer clip       Immunization History:   Immunization History   Administered Date(s) Administered    PPD Test 01/28/2019    Pneumococcal 13-valent Conjugate (Lauri Fent) 05/15/2019       Active Problems:  Patient Active Problem List   Diagnosis Code    [facility] G70.00    Polycystic kidney Q61.3    Hypertension I10    Hyperlipidemia E78.5    Arthritis M19.90    Hyperkalemia E87.5    Acute renal failure ([facility] N17.9    GI bleeding K92.2    Hypotension I95.9    History of esophageal stricture Z87.19    RBBB (right bundle branch block with left anterior fascicular block) I45.2    Acute blood loss anemia D62    Duodenal ulcer K26.9    KENNY (acute kidney injury) ([facility] N17.9    Acute upper GI bleed K92.2    UGI bleed K92.2    History of GI bleed Z87.19    Pneumonia of left lower lobe due to infectious organism ([facility]) J18.1    Pleural effusion J90    Elevated hemidiaphragm J98.6    Acute respiratory failure with hypoxia ([facility]) J96.01    NSVT (nonsustained ventricular tachycardia) ([facility]) I47.2    Syncope and collapse R55    Atrial fibrillation with slow ventricular response ([facility]) I48.91    Closed fracture of orbital wall ([facility]) S02.80XA    Stage 3 chronic kidney disease ([facility]) N18.3    Hyperglycemia R73.9    Neutropenia ([facility]) D70.9    S/P placement of cardiac pacemaker Z95.0    Syncope, cardiogenic R55    Cardiac pacemaker recipient Z95.0       Isolation/Infection:   Isolation          No Isolation            Nurse Assessment:  Last Vital Signs: /86   Pulse 95   Temp 97.3 °F (36.3 °C) (Oral)   Resp 16   Ht 5' 6.93\" (1.7 m)   Wt 185 lb 6.5 oz (84.1 kg)   SpO2 95%   BMI 29.10 kg/m²     Last documented pain score (0-10 scale): Pain Level: 1  Last Weight:   Wt Readings from Last 1 Encounters:   05/13/19 185 lb 6.5 oz (84.1 kg)     Mental Status:  oriented and alert    IV Access:  - None    Nursing Mobility/ADLs:  Walking   Independent  Transfer Independent  Bathing  Assisted  Dressing  Independent  Toileting  Independent  Feeding Independent  Med Admin  Assisted  Med Delivery   whole    Wound Care Documentation and Therapy:  Wound 05/07/19 Buttocks (Active)   Wound Other 5/8/2019  8:00 PM   Wound Assessment Other (Comment) 5/11/2019 10:58 AM   Number of days: 8        Elimination:  Continence:   · Bowel: YES  · Bladder: incont @ hs  Urinary Catheter: None   Colostomy/Ileostomy/Ileal Conduit: No       Date of Last BM: 5/15/19    Intake/Output Summary (Last 24 hours) at 5/15/2019 1025  Last data filed at 5/15/2019 0803  Gross per 24 hour   Intake 720 ml   Output --   Net 720 ml     I/O last 3 completed shifts: In: 5 [P.O.:720]  Out: -     Safety Concerns:     History of Falls (last 30 days)    Impairments/Disabilities:      None and Hearing    Nutrition Therapy:  Current Nutrition Therapy:   - Oral Diet:  Low Sodium (2gm)    Routes of Feeding: Oral  Liquids: Thin Liquids  Daily Fluid Restriction: no  Last Modified Barium Swallow with Video (Video Swallowing Test): not done    Treatments at the Time of Hospital Discharge:   Respiratory Treatments:   Oxygen Therapy:  is not on home oxygen therapy. Ventilator:    - No ventilator support    Rehab Therapies: Physical Therapy and Occupational Therapy  Weight Bearing Status/Restrictions: No weight bearing restirctions  Other Medical Equipment (for information only, NOT a DME order):  walker  Other Treatments:     Patient's personal belongings (please select all that are sent with patient):  Glasses, clothing    RN SIGNATURE:  Electronically signed by Jailyn Cooley RN on 5/15/19 at 10:57 AM    CASE MANAGEMENT/SOCIAL WORK SECTION    Inpatient Status Date: ***    Readmission Risk Assessment Score:  Readmission Risk              Risk of Unplanned Readmission:        15           Discharging to Facility/ Agency   · Name:       PATIENT REFUSED HOME RN VISIT.   · Address:  · Phone:  · Fax:      /Social Worker signature: Rosa Maria Tran, [facility], [place]     Case Management   [phone]    5/15/2019  1:32 PM      PHYSICIAN SECTION    Prognosis: Good    Condition at Discharge: Stable    Rehab Potential (if transferring to Rehab): Good    Recommended Labs or Other Treatments After Discharge: VN    Physician Certification: I certify the above information and transfer of Alison Perez  is necessary for the continuing treatment of the diagnosis listed and that he requires Home Care for less 30 days.      Update Admission H&P: No change in H&P    PHYSICIAN SIGNATURE:  Electronically signed by Sammi Angel MD on 5/15/19 at 10:26 AM

## 2019-05-15 NOTE — PLAN OF CARE
Problem: Pain:  Goal: Pain level will decrease  Description  Pain level will decrease  Outcome: Ongoing  Note:   Patient has no complaints of pain at this time. PRN pain medication to be administered per MD orders. Pain assessed on a 0-10 scale. Repositioning for comfort. Problem: Falls - Risk of:  Goal: Will remain free from falls  Description  Will remain free from falls  5/14/2019 2213 by Mirna Kiran RN  Outcome: Ongoing  Note:   Fall protocol in place. Bed and chair alarms in place. Non skid footwear on at all times. Room clean and clutter free. Call light with in reach. Education on proper call light use and to call before ambulating. Problem: Risk for Impaired Skin Integrity  Goal: Tissue integrity - skin and mucous membranes  Description  Structural intactness and normal physiological function of skin and  mucous membranes. 5/14/2019 2213 by Mirna Kiran RN  Outcome: Ongoing  Note:   Skin is clean and dry. No new skin issues noted at this time. Skin assessment completed every shift. Toileting every 2 hrs and as needed. Dressing on left elbow and pace maker site-CDI, no s/s of infection. Will continue to monitor. Problem: Risk for Impaired Skin Integrity  Goal: Tissue integrity - skin and mucous membranes  Description  Structural intactness and normal physiological function of skin and  mucous membranes. 5/14/2019 2213 by Mirna Kiran RN  Outcome: Ongoing  Note:   Skin is clean and dry. No new skin issues noted at this time. Skin assessment completed every shift. Toileting every 2 hrs and as needed. Dressing on left elbow and pace maker site-CDI, no s/s of infection. Will continue to monitor.     Problem: Infection:  Goal: Will remain free from infection  Description  Will remain free from infection  5/14/2019 2213 by Mirna Kiran RN  Outcome: Ongoing    Problem: Discharge Planning:  Goal: Patients continuum of care needs are met  Description  Patients continuum of care needs are met  5/14/2019 2213 by Alexa Sarabia RN  Outcome: Ongoing

## 2019-05-15 NOTE — PROGRESS NOTES
PHYSICAL THERAPY  Progress Note   Second Session    Patient Name: [address] Yeager Record Number: [id number]    Treatment Diagnosis: impaired functional mobility due to recent fall and pacemaker placement      Chart Reviewed: Yes   Restrictions/Precautions: Weight Bearing Other position/activity restrictions: New Pacemaker placed 5/7/19 - L UE ROM Restrictions (Do not elevate affected arm above shoulder for 30 days. No showering for one week)   Additional Pertinent Hx: 81 y/o male admit 5/7/19 with Syncope/Collapse, Bradycardia, CHF and CHI (Fall at Home). CT Head/C-Spine negative. CT Facial Bones - L Inferior Orbital Wall Fx. X-Ray L Elbow - Possible Nondisplaced Radial Head Fx (per Ortho 5/8/19 - \"I do not think the pt has Radial Head Fx.\"). PMH as noted including Myasthenia Gravis, Exp Lap,  Back Surg, B THR, R Elbow/L Hand and B Feet (Club Foot) Surgs, Arthritis. Subjective: Pt agreeable to therapy session. Denies pain or fatigue. Objective    Pt able to ambulate 200' over level surface with RW, Mod I, no LOB. Pt then ambulated outside over even/uneven surfaces 300', up/down ramp, Mod I, no LOB. Pt completed unsupported balance exercises:  Lateral weight shift x 1 min., A/P weight shift x 3 min. Forward/Backward stepping RLE/LLE x 10. Pt completed 12 min. On NuSt, level 3. Assessment:  Pt appears safe for independent function in his independent living apartment. Anticipate D/C home today, 5/15. No equipment needs.        Safety Device - Type of devices:  [x]  All fall risk precautions in place [] Bed alarm in place  [] Call light within reach [] Chair alarm in place [] Positioning belt [x] Gait belt [] Patient at risk for falls [] Left in bed [] Left in chair [] Telesitter in use [] Sitter present [] Nurse notified []  None      Therapy Time   Individual Co-treatment   Time In 1300     Time Out 1345     Minutes 45         Electronically signed by Jose Enrique Colunga PT [id number] on 5/15/2019 at 1:24 PM

## 2019-05-15 NOTE — PROGRESS NOTES
IM Progress Note          CC: Cardiogenic syncope    Interval history:  Ursula Hodge is doing well and he has no dizziness. His diplopia has improved on steroids. We anticipate discharge today. Review of Systems:  Review of Systems   Constitutional: Positive for fatigue. Negative for chills and fever. HENT: Negative for facial swelling. Eyes: Negative for visual disturbance. Respiratory: Negative. Cardiovascular: Negative. 14 point ros otherwise negative. Objective:    BP (!) 144/65   Pulse 81   Temp 97.4 °F (36.3 °C) (Oral)   Resp 16   Ht 5' 6.93\" (1.7 m)   Wt 185 lb 6.5 oz (84.1 kg)   SpO2 95%   BMI 29.10 kg/m²     Intake/Output Summary (Last 24 hours) at 5/15/2019 0835  Last data filed at 5/15/2019 0803  Gross per 24 hour   Intake 960 ml   Output --   Net 960 ml        Physical Exam   Constitutional: He is oriented to person, place, and time. He appears well-developed and well-nourished. No distress. Eyes: Pupils are equal, round, and reactive to light. EOM are normal.   Neck: No JVD present. Cardiovascular: Normal rate. Rhythm irregular. Pulmonary/Chest: Effort normal and breath sounds normal.   Musculoskeletal: He exhibits no edema. Neurological: He is alert and oriented to person, place, and time. CBC: No results for input(s): WBC, HGB, PLT in the last 72 hours. BMP:    Recent Labs     05/13/19  0709      K 4.2      CO2 24   BUN 33*   CREATININE 1.2   GLUCOSE 131*     Hepatic: No results for input(s): AST, ALT, ALB, BILITOT, ALKPHOS in the last 72 hours. Troponin: No results for input(s): TROPONINI in the last 72 hours. BNP: No results for input(s): BNP in the last 72 hours. Lipids: No results for input(s): CHOL, HDL in the last 72 hours. Invalid input(s): LDLCALCU  INR: No results for input(s): INR in the last 72 hours.      Glucose:    Recent Labs     05/13/19  0709   GLUCOSE 131*            Assessment/Plan:    Active Hospital Problems Diagnosis Date Noted    Syncope, cardiogenic [R55] - resolved. Priority: High    Cardiac pacemaker recipient [Z95.0] - doing well. 05/09/2019     Priority: Medium    Closed fracture of orbital wall ([facility] [S02.80XA] 05/07/2019     Priority: Medium - will see ENT as outpatient    Myasthenia gravis ([facility] [G70.00]      Priority: Medium - ocular symptoms improved.  Hyperglycemia [R73.9] - should improve as steroids are tapered. 05/07/2019     Priority: Low    Stage 3 chronic kidney disease ([facility] [N18.3] 05/07/2019     Priority: Low    Neutropenia ([facility] [D70.9] - resolved. 05/07/2019     Priority: Low    S/P placement of cardiac pacemaker [Z95.0]     Hypertension [I10]        Patient to be discharged today. Med. Rec completed. Will arrange outpatient ENT visit at [facility] for facial fracture. See at the office in one week. Time spent: > 35 minutes in exam, counseling and discharge planning.       Electronically signed by Staci Willis MD on 5/15/2019 at 8:35 AM

## 2019-05-16 ENCOUNTER — CARE COORDINATION (OUTPATIENT)
Dept: CASE MANAGEMENT | Age: 84
End: 2019-05-16

## 2019-05-16 ENCOUNTER — TELEPHONE (OUTPATIENT)
Dept: CARDIOLOGY | Age: 84
End: 2019-05-16

## 2019-05-16 DIAGNOSIS — R55 SYNCOPE AND COLLAPSE: Primary | ICD-10-CM

## 2019-05-16 PROCEDURE — 1111F DSCHRG MED/CURRENT MED MERGE: CPT

## 2019-05-16 NOTE — TELEPHONE ENCOUNTER
Spoke with  Homa Talbert about Eliquis. Concerned about having back sx in 2005 and his kidneys with taking eliquis:    Reassured that it is safe to be on Eliquis with his history of chronic back pain and surgery in 2005. It would be more concerning if someone continued to have repeated spinal procedures to be on eliquis, but this is not the case for him. Also clarified that he is taking Eliquis 2.5mg bid which is the reduced dose d/t to his age and Cr. It is completely safe to be on Eliquis at this dose. Patient confirmed understanding and will continue to take his eliquis as prescribed.

## 2019-05-16 NOTE — CARE COORDINATION
[facility] Transitions Initial Follow Up Call    Call within 2 business days of discharge: Yes    Patient: Kymberly Onofre Patient : 12/15/1929   MRN: [id number]  Reason for Admission:   Discharge Date: 5/15/19 RARS: Readmission Risk Score: 15      Last Discharge [facility]       Complaint Diagnosis Description Type Department Provider    19   Admission (Discharged) [address], [place]           Spoke with: Seble Dewitt (patient)    Facility: [facility]    Non-face-to-face services provided:  Obtained and reviewed discharge summary and/or continuity of care documents  Assessment and support for treatment adherence and medication management-daily med list reviewed & 1111f completed    Care Transitions 24 Hour Call    Do you have any ongoing symptoms?:  No  Do you have a copy of your discharge instructions?:  Yes  Do you have all of your prescriptions and are they filled?:  Yes  Have you been contacted by a [address]?:  No  Have you scheduled your follow up appointment?:  No  Were you discharged with any Home Care or Post Acute Services:  No  Patient DME:  Dio Vasquez  Do you have support at home?:  Alone  Are you an active caregiver in your home?:  No  Care Transitions Interventions         Follow Up: Spoke with Yong Mulligan. He states he is doing okay. Yong Mulligan states he received a copy of his discharge instructions and they were reviewed with him prior to his discharge. He reviewed his daily med list with writer(1111f completed). Writer offered assistance in scheduling hospital follow up with PCP. Yong Mulligan accepted. Writer was unable to complete task - no appointments available that writer could schedule. Encouraged Chris to call the PCP office to schedule. Yong Mulligan states he has a walker for ambulation - he uses it most of the time. Yong Isaak denies any needs at this time. He had concerns about one of his meds - he has spoken with the physician office and is okay now. Discussed role of CTC. Danae Gilliam is agreeable to follow up CTC calls. He took writer's contact info.   Future Appointments   Date Time Provider Davey Winkler   8/14/2019 10:45 AM SCHEDULE, [facility]   8/14/2019 11:00 AM Selina España MD [facility]   11/26/2019  7:30 AM SCHEDULE, [facility]       Terrence Ramirez RN

## 2019-05-16 NOTE — PROGRESS NOTES
Device and Site check done today. Device check shows proper functioning and no detection of arrhythmias since implantation. Incision site with well approximated edges, no s/s of infection, dressing removed. No drainage present. Reviewed instructions and care for site with patient. Follow up is scheduled in 3 months with Dr. Blake Kendrick.      April Gibson, [address]

## 2019-05-16 NOTE — TELEPHONE ENCOUNTER
Patient would like to know if it is safe for him to take eliquis. Per pt there is a warning on eliquis that if you have had spine surgery in the past that you should not take it, and the pt had spine surgery in 2005 to have a few lumbars repaired. You can reach the pt at [phone].

## 2019-05-16 NOTE — TELEPHONE ENCOUNTER
Patient has a device check and an appointment with Dr. Raudel Laguna on 8/14/19. Appointment yesterday with April was cancelled due to Rehab. Eliquis 2.5 mg twice a day  Prednisone 60 mg daily     FINAL DIAGNOSES:  1. Cardiogenic syncope. 2.  Atrial fibrillation with slow ventricular response. 3.  Closed fracture of the left inferior orbital ridge. 4.  Ocular myasthenia gravis. 5.  History of GI bleed. 7.  Stage III chronic kidney disease. 9.  Hyperglycemia. 10.  Polycystic kidney disease. 11.  Hypertension.   12.  Hyperlipidemia.

## 2019-05-23 ENCOUNTER — CARE COORDINATION (OUTPATIENT)
Dept: CASE MANAGEMENT | Age: 84
End: 2019-05-23

## 2019-05-23 ENCOUNTER — TELEPHONE (OUTPATIENT)
Dept: INTERNAL MEDICINE CLINIC | Age: 84
End: 2019-05-23

## 2019-05-23 NOTE — CARE COORDINATION
[facility] Transitions Follow Up Call    2019    Patient: Jacquelin Pay  Patient : 12/15/1929   MRN: [id number]  Reason for Admission:   Discharge Date: 5/15/19 RARS: Readmission Risk Score: 15         Spoke with: Gadiel Ruiz (patient)    Care Transitions Subsequent and Final Call    Subsequent and Final Calls  Do you have any ongoing symptoms?:  No  Have your medications changed?:  No  Do you have any questions related to your medications?:  No  Do you currently have any active services?:  No  Do you have any needs or concerns that I can assist you with?:  No  Identified Barriers:  None  Care Transitions Interventions  Other Interventions: Follow Up: Spoke with Jake Gallardo. He states he is doing fine. States he feels like he is back to normal. Jake Gallardo states he scheduled with his PCP for next week. Jake Gallardo denies any needs today. He also declines any additional CTC calls. He states he will call his PCP or CTC with any issues.   Future Appointments   Date Time Provider Davey Fernandoi   2019  2:40 PM Neymar Norman MD [address]   2019 10:45 AM SCHEDULE, [facility]   2019 11:00 AM Shelle Simmonds, MD [facility]   2019  7:30 AM SCHEDULE, [facility] ALEXANDRE Linda RN

## 2019-05-28 ENCOUNTER — TELEPHONE (OUTPATIENT)
Dept: INTERNAL MEDICINE CLINIC | Age: 84
End: 2019-05-28

## 2019-05-29 ENCOUNTER — OFFICE VISIT (OUTPATIENT)
Dept: INTERNAL MEDICINE CLINIC | Age: 84
End: 2019-05-29
Payer: MEDICARE

## 2019-05-29 VITALS
WEIGHT: 190 LBS | DIASTOLIC BLOOD PRESSURE: 80 MMHG | SYSTOLIC BLOOD PRESSURE: 144 MMHG | OXYGEN SATURATION: 97 % | BODY MASS INDEX: 29.82 KG/M2

## 2019-05-29 DIAGNOSIS — S02.85XD CLOSED FRACTURE OF ORBITAL WALL WITH ROUTINE HEALING, SUBSEQUENT ENCOUNTER: ICD-10-CM

## 2019-05-29 DIAGNOSIS — G70.00 MYASTHENIA GRAVIS (HCC): ICD-10-CM

## 2019-05-29 DIAGNOSIS — R55 SYNCOPE, CARDIOGENIC: Primary | ICD-10-CM

## 2019-05-29 DIAGNOSIS — I48.91 ATRIAL FIBRILLATION WITH SLOW VENTRICULAR RESPONSE (HCC): ICD-10-CM

## 2019-05-29 PROCEDURE — G8419 CALC BMI OUT NRM PARAM NOF/U: HCPCS | Performed by: INTERNAL MEDICINE

## 2019-05-29 PROCEDURE — 99214 OFFICE O/P EST MOD 30 MIN: CPT | Performed by: INTERNAL MEDICINE

## 2019-05-29 PROCEDURE — 1123F ACP DISCUSS/DSCN MKR DOCD: CPT | Performed by: INTERNAL MEDICINE

## 2019-05-29 PROCEDURE — 1111F DSCHRG MED/CURRENT MED MERGE: CPT | Performed by: INTERNAL MEDICINE

## 2019-05-29 PROCEDURE — 1036F TOBACCO NON-USER: CPT | Performed by: INTERNAL MEDICINE

## 2019-05-29 PROCEDURE — 4040F PNEUMOC VAC/ADMIN/RCVD: CPT | Performed by: INTERNAL MEDICINE

## 2019-05-29 PROCEDURE — G8428 CUR MEDS NOT DOCUMENT: HCPCS | Performed by: INTERNAL MEDICINE

## 2019-05-29 ASSESSMENT — ENCOUNTER SYMPTOMS: RESPIRATORY NEGATIVE: 1

## 2019-05-29 NOTE — PROGRESS NOTES
Subjective:      Patient ID: Jazmín Morales is a 80 y.o. male. HPI  Peace Sutherland is here is follow-up from recent hospital stay for cardiogenic syncope, pacemeaker placement, left inferior orbit fracture 2/2 syncope and Myasthenia Gravis. Cardiogenic syncope - no recurrence of syncope post pacemaker. Left inferior orbital fracture - no pain or visual change. Has been seen by Dr. Shauna Alcala and will be observing for now. Myasthenia Gravis - was on high dose Prednisone in the hospital for occular myasthenia. He is now off. His diplopia has resolved. Unsure how long this will last.    Review of Systems   HENT: Negative. Eyes: Negative for visual disturbance. Respiratory: Negative. Cardiovascular: Negative. Musculoskeletal: Negative. 14 point ros otherwise negative. Objective:   Physical Exam   Constitutional: He is oriented to person, place, and time. He appears well-developed and well-nourished. No distress. Neck: No JVD present. Cardiovascular: Normal rate. Rhythm slightly irregular. Pulmonary/Chest: Effort normal and breath sounds normal.   Abdominal: Soft. He exhibits no distension. Musculoskeletal: He exhibits no edema. Neurological: He is alert and oriented to person, place, and time. Skin:   Ecchymosis under left eye. Assessment:      1. Syncope, cardiogenic    2. Closed fracture of orbital wall with routine healing, subsequent encounter    3. Myasthenia gravis ([address]            Plan:      Peace Sutherland was seen today for follow-up from hospital.    Diagnoses and all orders for this visit:    Syncope, cardiogenic, stable post PM    Closed fracture of orbital wall with routine healing, subsequent encounter, stable        - No further intervention. Myasthenia gravis ([address] controlled        - Prednisone stopped    Other orders  -     apixaban (ELIQUIS) 2.5 MG TABS tablet; Take 1 tablet by mouth 2 times daily      See 2 months.             Tate Ortega MD

## 2019-06-10 ENCOUNTER — TELEPHONE (OUTPATIENT)
Dept: INTERNAL MEDICINE CLINIC | Age: 84
End: 2019-06-10

## 2019-06-10 RX ORDER — METOPROLOL SUCCINATE 25 MG/1
TABLET, EXTENDED RELEASE ORAL
Qty: 90 TABLET | Refills: 4 | Status: SHIPPED | OUTPATIENT
Start: 2019-06-10 | End: 2020-01-29 | Stop reason: SDUPTHER

## 2019-07-31 ENCOUNTER — OFFICE VISIT (OUTPATIENT)
Dept: INTERNAL MEDICINE CLINIC | Age: 84
End: 2019-07-31
Payer: MEDICARE

## 2019-07-31 VITALS
OXYGEN SATURATION: 97 % | TEMPERATURE: 97.5 F | BODY MASS INDEX: 29.35 KG/M2 | WEIGHT: 187 LBS | HEART RATE: 93 BPM | DIASTOLIC BLOOD PRESSURE: 76 MMHG | SYSTOLIC BLOOD PRESSURE: 130 MMHG

## 2019-07-31 DIAGNOSIS — R55 SYNCOPE, CARDIOGENIC: Primary | ICD-10-CM

## 2019-07-31 DIAGNOSIS — G70.00 MYASTHENIA GRAVIS (HCC): ICD-10-CM

## 2019-07-31 DIAGNOSIS — I10 ESSENTIAL HYPERTENSION: ICD-10-CM

## 2019-07-31 PROCEDURE — 1123F ACP DISCUSS/DSCN MKR DOCD: CPT | Performed by: INTERNAL MEDICINE

## 2019-07-31 PROCEDURE — 1036F TOBACCO NON-USER: CPT | Performed by: INTERNAL MEDICINE

## 2019-07-31 PROCEDURE — G8419 CALC BMI OUT NRM PARAM NOF/U: HCPCS | Performed by: INTERNAL MEDICINE

## 2019-07-31 PROCEDURE — G8427 DOCREV CUR MEDS BY ELIG CLIN: HCPCS | Performed by: INTERNAL MEDICINE

## 2019-07-31 PROCEDURE — 4040F PNEUMOC VAC/ADMIN/RCVD: CPT | Performed by: INTERNAL MEDICINE

## 2019-07-31 PROCEDURE — 99214 OFFICE O/P EST MOD 30 MIN: CPT | Performed by: INTERNAL MEDICINE

## 2019-07-31 ASSESSMENT — ENCOUNTER SYMPTOMS
RESPIRATORY NEGATIVE: 1
GASTROINTESTINAL NEGATIVE: 1

## 2019-08-13 NOTE — PROGRESS NOTES
[facility]   Cardiac Electrophysiology Consultation   Date: 8/14/2019  Reason for Consultation: Device management & atrial fibrillation  Consult Requesting Physician:  Umu Oneill MD    Chief Complaint:   Chief Complaint   Patient presents with    3 Month Follow-Up     AFib, HTN, device check / no cardiac issues        HPI: Dieudonne Solorzano is a 80 y.o. 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 Interval History: Today, he presents to office for follow up s/p Medtronic dual chamber PPM on 5/17/19. He reports feeling less fatigued since his PPM was placed. He is compliant with his medications and tolerating them well. He denies chest pain/pressure, tightness, edema, shortness of breath, heart racing, palpitations, lightheadedness, dizziness, syncope, presyncope,  PND or orthopnea.      Past Medical History:   Diagnosis Date    Arthritis     Hyperlipidemia     Hypertension     Myasthenia gravis     Polycystic kidney         Past Surgical History:   Procedure Laterality Date    BACK SURGERY  2005    CATARACT REMOVAL WITH IMPLANT Right 2/22/13    CATARACT REMOVAL WITH IMPLANT Left 3/22/13    CHOLECYSTECTOMY      COLON SURGERY      FRACTURE SURGERY      HAND SURGERY      JOINT REPLACEMENT      bilateral hip replacement    LITHOTRIPSY      ORTHOPEDIC SURGERY      right elbow & left hand & club feet    OTHER SURGICAL HISTORY      removal mediport    SMALL INTESTINE SURGERY  4/9/13    EXPLORATORY LAPAROTOMY; SMALL BOWEL RESECTION; LYSIS OF ADHESIONS;    TONSILLECTOMY      UPPER GASTROINTESTINAL ENDOSCOPY  9/10/14    with dilatation    UPPER GASTROINTESTINAL ENDOSCOPY  9-17-14    EGD Electrophysiology  [address], [address] [place] [address]  Donovan Langley [address]  [phone]

## 2019-08-14 ENCOUNTER — OFFICE VISIT (OUTPATIENT)
Dept: CARDIOLOGY CLINIC | Age: 84
End: 2019-08-14
Payer: MEDICARE

## 2019-08-14 ENCOUNTER — PROCEDURE VISIT (OUTPATIENT)
Dept: CARDIOLOGY CLINIC | Age: 84
End: 2019-08-14
Payer: MEDICARE

## 2019-08-14 VITALS
WEIGHT: 196 LBS | DIASTOLIC BLOOD PRESSURE: 80 MMHG | OXYGEN SATURATION: 90 % | HEIGHT: 66 IN | HEART RATE: 91 BPM | SYSTOLIC BLOOD PRESSURE: 120 MMHG | BODY MASS INDEX: 31.5 KG/M2

## 2019-08-14 DIAGNOSIS — Z95.0 CARDIAC PACEMAKER RECIPIENT: Primary | ICD-10-CM

## 2019-08-14 DIAGNOSIS — I48.21 PERMANENT ATRIAL FIBRILLATION (HCC): Primary | ICD-10-CM

## 2019-08-14 DIAGNOSIS — R00.1 BRADYCARDIA, SINUS: ICD-10-CM

## 2019-08-14 DIAGNOSIS — I45.89 AV NODE DYSFUNCTION: ICD-10-CM

## 2019-08-14 PROCEDURE — G8417 CALC BMI ABV UP PARAM F/U: HCPCS | Performed by: INTERNAL MEDICINE

## 2019-08-14 PROCEDURE — G8427 DOCREV CUR MEDS BY ELIG CLIN: HCPCS | Performed by: INTERNAL MEDICINE

## 2019-08-14 PROCEDURE — 4040F PNEUMOC VAC/ADMIN/RCVD: CPT | Performed by: INTERNAL MEDICINE

## 2019-08-14 PROCEDURE — 93280 PM DEVICE PROGR EVAL DUAL: CPT | Performed by: INTERNAL MEDICINE

## 2019-08-14 PROCEDURE — 1123F ACP DISCUSS/DSCN MKR DOCD: CPT | Performed by: INTERNAL MEDICINE

## 2019-08-14 PROCEDURE — 1036F TOBACCO NON-USER: CPT | Performed by: INTERNAL MEDICINE

## 2019-08-14 PROCEDURE — 99214 OFFICE O/P EST MOD 30 MIN: CPT | Performed by: INTERNAL MEDICINE

## 2019-08-14 PROCEDURE — 93000 ELECTROCARDIOGRAM COMPLETE: CPT | Performed by: INTERNAL MEDICINE

## 2019-08-14 NOTE — LETTER
[address]   Cardiac Electrophysiology Consultation   Date: 8/14/2019  Reason for Consultation: Device management & atrial fibrillation  Consult Requesting Physician:  Katalina Pandya MD    Chief Complaint:   Chief Complaint   Patient presents with    3 Month Follow-Up     AFib, HTN, device check / no cardiac issues        HPI: Mitzy Vaca is a 80 y.o. 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 Interval History: Today, he presents to office for follow up s/p Medtronic dual chamber PPM on 5/17/19. He reports feeling less fatigued since his PPM was placed. He is compliant with his medications and tolerating them well. He denies chest pain/pressure, tightness, edema, shortness of breath, heart racing, palpitations, lightheadedness, dizziness, syncope, presyncope,  PND or orthopnea.      Past Medical History:   Diagnosis Date    Arthritis     Hyperlipidemia     Hypertension     Myasthenia gravis     Polycystic kidney         Past Surgical History:   Procedure Laterality Date    BACK SURGERY  2005    CATARACT REMOVAL WITH IMPLANT Right 2/22/13    CATARACT REMOVAL WITH IMPLANT Left 3/22/13    CHOLECYSTECTOMY      COLON SURGERY      FRACTURE SURGERY      HAND SURGERY      JOINT REPLACEMENT      bilateral hip replacement    LITHOTRIPSY      ORTHOPEDIC SURGERY      right elbow & left hand & club feet    OTHER SURGICAL HISTORY      removal mediport    SMALL INTESTINE SURGERY  4/9/13    EXPLORATORY LAPAROTOMY; SMALL BOWEL RESECTION; LYSIS OF ADHESIONS;    TONSILLECTOMY      UPPER GASTROINTESTINAL ENDOSCOPY  9/10/14    with dilatation    UPPER GASTROINTESTINAL ENDOSCOPY  9-17-14

## 2019-09-12 RX ORDER — AMLODIPINE BESYLATE 2.5 MG/1
TABLET ORAL
Qty: 30 TABLET | Refills: 5 | Status: SHIPPED | OUTPATIENT
Start: 2019-09-12 | End: 2020-01-29 | Stop reason: SDUPTHER

## 2019-09-26 NOTE — CONSULTS
[facility]   Electrophysiology Nurse Practitioner      Date: 5/7/2019    Reason for Consultation/ Chief Complaint: bradycardia with syncope    History Obtained From: Patient and medical record. Cardiac Hx: Aly Barr is a 80 y.o. with PMH of myasthenia gravis, polycystic kidney disease, GI bleed, HLD, HTN, and pAF (first documented in 2014 post abdominal surgery, started amio and coumadin, then discontinued and remained in NSR) p/w syncopal episode while getting out of bed this AM and found to be in atrial fibrillation with slow ventricular response with a rate of 33 bpm on ECG. On admission, CT scan shows facial fracture of left orbital wall and XR with possible left radius fracture, BNP 3100, negative troponin, Cr 1.8 (baseline), WBC 1.7, and lactic acid 2.4. Today: Patient says his hands hurt from falling this morning. Otherwise, he denies symptoms of dizziness, lightheadedness, palpitations, fatigue, CP, tightness, nausea, vomiting, diarrhea, and fever/chills. States he has taken metoprolol for over a year without issues, never been told he has a slow heart rate. He sees Dr. Caesar Wylie.       Past Medical History:   Diagnosis Date    Arthritis     Hyperlipidemia     Hypertension     Myasthenia gravis     Polycystic kidney         Past Surgical History:   Procedure Laterality Date    BACK SURGERY      CATARACT REMOVAL WITH IMPLANT Right 2/22/13    CATARACT REMOVAL WITH IMPLANT Left 3/22/13    CHOLECYSTECTOMY      COLON SURGERY      FRACTURE SURGERY      HAND SURGERY      JOINT REPLACEMENT      bilateral hip replacement    LITHOTRIPSY      ORTHOPEDIC SURGERY      right elbow & left hand & club feet    OTHER SURGICAL HISTORY      removal mediport    SMALL INTESTINE SURGERY  4/9/13    EXPLORATORY LAPAROTOMY; SMALL BOWEL RESECTION; LYSIS OF ADHESIONS;    TONSILLECTOMY      UPPER GASTROINTESTINAL ENDOSCOPY  9/10/14    with dilatation    UPPER GASTROINTESTINAL ENDOSCOPY  9-17-14 Pending eye surgery left one 010/1/2019 and right one one week after that. Seeing Dr. Rod, ophthalmology     Min: 25  Max: 52  BP  Min: 141/122  Max: 180/92  SpO2  Av.6 %  Min: 86 %  Max: 100 %  No intake or output data in the 24 hours ending 19 0936    · Constitutional: Oriented. No distress. · Head: Normocephalic and atraumatic. · Mouth/Throat: Oropharynx is clear and moist.   · Eyes: Conjunctivae clear without jaunduice. PERRL. · Neck: Neck supple. No rigidity. No JVD present. · Cardiovascular: slow rate, irreg rhythm, no murmurs. · Pulmonary/Chest: Bilateral respiratory sounds. No wheezes, No rhonchi. · Abdominal: Soft. Hypoactive BS. No distension, No tenderness. · Musculoskeletal: No tenderness. No edema    · Lymphadenopathy: Has no cervical adenopathy. · Neurological: Alert and oriented. Cranial nerve appears intact, No Gross deficit   · Skin: Skin is warm and dry. Ecchymosis left eye and elbow. · Psychiatric: Has a normal mood, affect and behavior     Labs:  Reviewed. Recent Labs     19  0606      K 4.5      CO2 20*   BUN 26*   CREATININE 1.8*     Recent Labs     19  0606   WBC 1.7*   HGB 11.5*   HCT 35.0*   MCV 87.6   *     Lab Results   Component Value Date    CKTOTAL 17 2012    TROPONINI <0.01 2019     No results found for: BNP  Lab Results   Component Value Date    PROTIME 10.3 09/10/2014    PROTIME 11.5 2013    PROTIME 9.8 2013    INR 0.96 09/10/2014    INR 1.01 2013    INR 0.85 2013     Lab Results   Component Value Date    CHOL 117 2013    HDL 41 2013    HDL 60 2012    TRIG 104 2013       Telemetry: Personally reviewed: atrial fibrillation with v-rate 40's     Radiography: Personally reviewed: 2019  CXR:  No acute disease.     ECG: Personally reviewed: Atrial fibrillation; HR 33, , QTc 454    ECHO:  2018   Summary   Normal left ventricle size, wall thickness, and systolic function with an   estimated ejection fraction of 55-60%.   The right ventricle is normal in size and function.   Trivial aortic regurgitation.   Mitral valve prolapse is present.   Mild mitral regurgitation. Assessment/Plan:   Cardiac Hx: Alison Perez is a 80 y.o. with PMH of myasthenia gravis, polycystic kidney disease, GI bleed, HLD, HTN, and pAF (first documented in 2014 post abdominal surgery, started amio and coumadin, then discontinued and remained in NSR) p/w syncopal episode while getting out of bed this AM and found to be in atrial fibrillation with slow ventricular response with a rate of 33 bpm on ECG. On admission, CT scan shows facial fracture of left orbital wall and XR with possible left radius fracture, BNP 3100, negative troponin, Cr 1.8 (baseline), WBC 1.7, and lactic acid 2.4. [id number] 3 TSH 2.99 (4/20/2019)    Atrial Fibrillation with slow ventricular response  -w/ cardiogenic syncope and collapse this AM  -v-rate 20-30's on tele  -unknown how long been in AF; office visit (1/2019) looked to be in regular rhythm with pulse of 106 bpm  -not on [facility] or [facility] prior to admission  -Avoiding AV lizett blocking agents; d/c toprol xl  -keep K>4, Mg>2  -bradycardia not related to metoprolol use; PPM indicated   -U/A needed prior to procedure     Leukopenia  -repeated CBC and lactic acid   -defer to primary team    Plan for MDT dual chamber PPM today with Dr. Clara Szymanski as long as U/A negative. I have discussed this plan with Dr. Clara Szymanski, patient to be NPO. Thank you! Ann Tavares CNP  [address]  Electrophysiology       The NP's documentation has been prepared under my direction and personally reviewed by me in its entirety. I confirm that the note above accurately reflects all work, physical examination, the discussion of treatments and procedures, and medical decision making performed by me. The patient meets a class I indication for PPM implantation for non-reversible bradycardia due to abnormal AV node, His conduction system leading to symptoms of syncope.  The risks and benefits of PPM implantation were discussed with the patient, and he wishes to pursue the implantation. We will plan to do so some time today. Continue to keep NPO for now.      Shelle Simmonds, MD, MS, [facility], [facility]  Cardiac Electrophysiology  [address]  [address] [facility], [address] [place] [address]  Donovan Langley [facility] [address]  [phone]

## 2019-11-07 ENCOUNTER — OFFICE VISIT (OUTPATIENT)
Dept: INTERNAL MEDICINE CLINIC | Age: 84
End: 2019-11-07
Payer: MEDICARE

## 2019-11-07 VITALS
RESPIRATION RATE: 18 BRPM | TEMPERATURE: 97.8 F | DIASTOLIC BLOOD PRESSURE: 86 MMHG | BODY MASS INDEX: 31.43 KG/M2 | OXYGEN SATURATION: 98 % | HEIGHT: 66 IN | WEIGHT: 195.6 LBS | SYSTOLIC BLOOD PRESSURE: 148 MMHG | HEART RATE: 84 BPM

## 2019-11-07 DIAGNOSIS — N30.01 ACUTE CYSTITIS WITH HEMATURIA: Primary | ICD-10-CM

## 2019-11-07 DIAGNOSIS — N18.30 STAGE 3 CHRONIC KIDNEY DISEASE (HCC): ICD-10-CM

## 2019-11-07 DIAGNOSIS — Z12.5 PROSTATE CANCER SCREENING: ICD-10-CM

## 2019-11-07 LAB
ANION GAP SERPL CALCULATED.3IONS-SCNC: 17 MMOL/L (ref 3–16)
BASOPHILS ABSOLUTE: 0 K/UL (ref 0–0.2)
BASOPHILS RELATIVE PERCENT: 0.7 %
BILIRUBIN, POC: NORMAL
BLOOD URINE, POC: NORMAL
BUN BLDV-MCNC: 20 MG/DL (ref 7–20)
CALCIUM SERPL-MCNC: 8.7 MG/DL (ref 8.3–10.6)
CHLORIDE BLD-SCNC: 106 MMOL/L (ref 99–110)
CLARITY, POC: CLEAR
CO2: 22 MMOL/L (ref 21–32)
COLOR, POC: NORMAL
CREAT SERPL-MCNC: 1.5 MG/DL (ref 0.8–1.3)
EOSINOPHILS ABSOLUTE: 0 K/UL (ref 0–0.6)
EOSINOPHILS RELATIVE PERCENT: 1 %
GFR AFRICAN AMERICAN: 53
GFR NON-AFRICAN AMERICAN: 44
GLUCOSE BLD-MCNC: 113 MG/DL (ref 70–99)
GLUCOSE URINE, POC: NORMAL
HCT VFR BLD CALC: 34.8 % (ref 40.5–52.5)
HEMOGLOBIN: 11.3 G/DL (ref 13.5–17.5)
KETONES, POC: NORMAL
LEUKOCYTE EST, POC: NORMAL
LYMPHOCYTES ABSOLUTE: 0.9 K/UL (ref 1–5.1)
LYMPHOCYTES RELATIVE PERCENT: 26.9 %
MCH RBC QN AUTO: 27.8 PG (ref 26–34)
MCHC RBC AUTO-ENTMCNC: 32.6 G/DL (ref 31–36)
MCV RBC AUTO: 85.2 FL (ref 80–100)
MONOCYTES ABSOLUTE: 0.4 K/UL (ref 0–1.3)
MONOCYTES RELATIVE PERCENT: 11.9 %
NEUTROPHILS ABSOLUTE: 1.9 K/UL (ref 1.7–7.7)
NEUTROPHILS RELATIVE PERCENT: 59.5 %
NITRITE, POC: NORMAL
PDW BLD-RTO: 17.6 % (ref 12.4–15.4)
PH, POC: 7
PLATELET # BLD: 187 K/UL (ref 135–450)
PMV BLD AUTO: 7.9 FL (ref 5–10.5)
POTASSIUM SERPL-SCNC: 4.4 MMOL/L (ref 3.5–5.1)
PROSTATE SPECIFIC ANTIGEN: 1.41 NG/ML (ref 0–4)
PROTEIN, POC: >300
RBC # BLD: 4.08 M/UL (ref 4.2–5.9)
SODIUM BLD-SCNC: 145 MMOL/L (ref 136–145)
SPECIFIC GRAVITY, POC: 1.02
UROBILINOGEN, POC: 1
WBC # BLD: 3.3 K/UL (ref 4–11)

## 2019-11-07 PROCEDURE — 99213 OFFICE O/P EST LOW 20 MIN: CPT | Performed by: NURSE PRACTITIONER

## 2019-11-07 PROCEDURE — G8427 DOCREV CUR MEDS BY ELIG CLIN: HCPCS | Performed by: NURSE PRACTITIONER

## 2019-11-07 PROCEDURE — 1036F TOBACCO NON-USER: CPT | Performed by: NURSE PRACTITIONER

## 2019-11-07 PROCEDURE — G8510 SCR DEP NEG, NO PLAN REQD: HCPCS | Performed by: NURSE PRACTITIONER

## 2019-11-07 PROCEDURE — 3288F FALL RISK ASSESSMENT DOCD: CPT | Performed by: NURSE PRACTITIONER

## 2019-11-07 PROCEDURE — 1123F ACP DISCUSS/DSCN MKR DOCD: CPT | Performed by: NURSE PRACTITIONER

## 2019-11-07 PROCEDURE — G8417 CALC BMI ABV UP PARAM F/U: HCPCS | Performed by: NURSE PRACTITIONER

## 2019-11-07 PROCEDURE — 4040F PNEUMOC VAC/ADMIN/RCVD: CPT | Performed by: NURSE PRACTITIONER

## 2019-11-07 PROCEDURE — G8484 FLU IMMUNIZE NO ADMIN: HCPCS | Performed by: NURSE PRACTITIONER

## 2019-11-07 PROCEDURE — 36415 COLL VENOUS BLD VENIPUNCTURE: CPT | Performed by: NURSE PRACTITIONER

## 2019-11-07 PROCEDURE — 81002 URINALYSIS NONAUTO W/O SCOPE: CPT | Performed by: NURSE PRACTITIONER

## 2019-11-07 RX ORDER — SULFAMETHOXAZOLE AND TRIMETHOPRIM 800; 160 MG/1; MG/1
1 TABLET ORAL 2 TIMES DAILY
Qty: 20 TABLET | Refills: 0 | Status: SHIPPED | OUTPATIENT
Start: 2019-11-07 | End: 2019-11-17

## 2019-11-07 ASSESSMENT — ENCOUNTER SYMPTOMS
NAUSEA: 0
SHORTNESS OF BREATH: 0
WHEEZING: 0
ABDOMINAL PAIN: 0
COUGH: 0
VOMITING: 0

## 2019-11-07 ASSESSMENT — PATIENT HEALTH QUESTIONNAIRE - PHQ9
SUM OF ALL RESPONSES TO PHQ9 QUESTIONS 1 & 2: 0
1. LITTLE INTEREST OR PLEASURE IN DOING THINGS: 0
SUM OF ALL RESPONSES TO PHQ QUESTIONS 1-9: 0
SUM OF ALL RESPONSES TO PHQ QUESTIONS 1-9: 0
2. FEELING DOWN, DEPRESSED OR HOPELESS: 0

## 2019-11-07 ASSESSMENT — LIFESTYLE VARIABLES: TOBACCO_USE: 0

## 2019-11-08 LAB — URINE CULTURE, ROUTINE: NORMAL

## 2019-11-08 ASSESSMENT — ENCOUNTER SYMPTOMS: BACK PAIN: 1

## 2019-11-14 ENCOUNTER — NURSE ONLY (OUTPATIENT)
Dept: INTERNAL MEDICINE CLINIC | Age: 84
End: 2019-11-14
Payer: MEDICARE

## 2019-11-14 DIAGNOSIS — N30.01 ACUTE CYSTITIS WITH HEMATURIA: Primary | ICD-10-CM

## 2019-11-14 LAB
ANION GAP SERPL CALCULATED.3IONS-SCNC: 16 MMOL/L (ref 3–16)
BILIRUBIN, POC: ABNORMAL
BLOOD URINE, POC: ABNORMAL
BUN BLDV-MCNC: 26 MG/DL (ref 7–20)
CALCIUM SERPL-MCNC: 9.1 MG/DL (ref 8.3–10.6)
CHLORIDE BLD-SCNC: 105 MMOL/L (ref 99–110)
CLARITY, POC: ABNORMAL
CO2: 23 MMOL/L (ref 21–32)
COLOR, POC: ABNORMAL
CREAT SERPL-MCNC: 1.7 MG/DL (ref 0.8–1.3)
GFR AFRICAN AMERICAN: 46
GFR NON-AFRICAN AMERICAN: 38
GLUCOSE BLD-MCNC: 129 MG/DL (ref 70–99)
GLUCOSE URINE, POC: ABNORMAL
KETONES, POC: ABNORMAL
LEUKOCYTE EST, POC: ABNORMAL
NITRITE, POC: ABNORMAL
PH, POC: 6
POTASSIUM SERPL-SCNC: 4.1 MMOL/L (ref 3.5–5.1)
PROTEIN, POC: 100
SODIUM BLD-SCNC: 144 MMOL/L (ref 136–145)
SPECIFIC GRAVITY, POC: 1.02
UROBILINOGEN, POC: 0.2

## 2019-11-14 PROCEDURE — 36415 COLL VENOUS BLD VENIPUNCTURE: CPT | Performed by: NURSE PRACTITIONER

## 2019-11-14 PROCEDURE — 81002 URINALYSIS NONAUTO W/O SCOPE: CPT | Performed by: NURSE PRACTITIONER

## 2019-11-26 ENCOUNTER — NURSE ONLY (OUTPATIENT)
Dept: CARDIOLOGY CLINIC | Age: 84
End: 2019-11-26
Payer: MEDICARE

## 2019-11-26 DIAGNOSIS — R00.1 BRADYCARDIA, SINUS: ICD-10-CM

## 2019-11-26 DIAGNOSIS — Z95.0 CARDIAC PACEMAKER RECIPIENT: ICD-10-CM

## 2019-11-26 PROCEDURE — 93294 REM INTERROG EVL PM/LDLS PM: CPT | Performed by: INTERNAL MEDICINE

## 2019-11-26 PROCEDURE — 93296 REM INTERROG EVL PM/IDS: CPT | Performed by: INTERNAL MEDICINE

## 2019-12-06 ENCOUNTER — TELEPHONE (OUTPATIENT)
Dept: INTERNAL MEDICINE CLINIC | Age: 84
End: 2019-12-06

## 2019-12-06 RX ORDER — DOXAZOSIN MESYLATE 4 MG/1
4 TABLET ORAL NIGHTLY
Qty: 30 TABLET | Refills: 2 | Status: SHIPPED | OUTPATIENT
Start: 2019-12-06 | End: 2020-01-29 | Stop reason: SDUPTHER

## 2019-12-09 RX ORDER — APIXABAN 2.5 MG/1
TABLET, FILM COATED ORAL
Qty: 120 TABLET | Refills: 0 | Status: SHIPPED | OUTPATIENT
Start: 2019-12-09 | End: 2020-01-29 | Stop reason: SDUPTHER

## 2020-01-29 ENCOUNTER — OFFICE VISIT (OUTPATIENT)
Dept: INTERNAL MEDICINE CLINIC | Age: 85
End: 2020-01-29
Payer: MEDICARE

## 2020-01-29 VITALS
TEMPERATURE: 97.6 F | HEIGHT: 66 IN | WEIGHT: 195 LBS | SYSTOLIC BLOOD PRESSURE: 126 MMHG | BODY MASS INDEX: 31.34 KG/M2 | OXYGEN SATURATION: 99 % | RESPIRATION RATE: 18 BRPM | DIASTOLIC BLOOD PRESSURE: 68 MMHG | HEART RATE: 82 BPM

## 2020-01-29 PROCEDURE — 99214 OFFICE O/P EST MOD 30 MIN: CPT | Performed by: INTERNAL MEDICINE

## 2020-01-29 PROCEDURE — G8484 FLU IMMUNIZE NO ADMIN: HCPCS | Performed by: INTERNAL MEDICINE

## 2020-01-29 PROCEDURE — 1036F TOBACCO NON-USER: CPT | Performed by: INTERNAL MEDICINE

## 2020-01-29 PROCEDURE — G8417 CALC BMI ABV UP PARAM F/U: HCPCS | Performed by: INTERNAL MEDICINE

## 2020-01-29 PROCEDURE — G8427 DOCREV CUR MEDS BY ELIG CLIN: HCPCS | Performed by: INTERNAL MEDICINE

## 2020-01-29 PROCEDURE — 4040F PNEUMOC VAC/ADMIN/RCVD: CPT | Performed by: INTERNAL MEDICINE

## 2020-01-29 PROCEDURE — 1123F ACP DISCUSS/DSCN MKR DOCD: CPT | Performed by: INTERNAL MEDICINE

## 2020-01-29 RX ORDER — PREDNISONE 20 MG/1
60 TABLET ORAL DAILY
Qty: 60 TABLET | Refills: 1 | Status: SHIPPED | OUTPATIENT
Start: 2020-01-29 | End: 2021-10-12 | Stop reason: SDUPTHER

## 2020-01-29 RX ORDER — AMLODIPINE BESYLATE 2.5 MG/1
TABLET ORAL
Qty: 30 TABLET | Refills: 2 | Status: SHIPPED | OUTPATIENT
Start: 2020-01-29 | End: 2020-06-29 | Stop reason: SDUPTHER

## 2020-01-29 RX ORDER — DOXAZOSIN MESYLATE 4 MG/1
4 TABLET ORAL NIGHTLY
Qty: 30 TABLET | Refills: 2 | Status: SHIPPED | OUTPATIENT
Start: 2020-01-29 | End: 2020-09-10 | Stop reason: SDUPTHER

## 2020-01-29 RX ORDER — ATORVASTATIN CALCIUM 10 MG/1
TABLET, FILM COATED ORAL
Qty: 90 TABLET | Refills: 1 | Status: SHIPPED | OUTPATIENT
Start: 2020-01-29 | End: 2020-09-10 | Stop reason: SDUPTHER

## 2020-01-29 RX ORDER — PREDNISONE 20 MG/1
3 TABLET ORAL
COMMUNITY
End: 2020-01-29 | Stop reason: SDUPTHER

## 2020-01-29 RX ORDER — METOPROLOL SUCCINATE 25 MG/1
TABLET, EXTENDED RELEASE ORAL
Qty: 90 TABLET | Refills: 1 | Status: SHIPPED | OUTPATIENT
Start: 2020-01-29 | End: 2020-09-10 | Stop reason: SDUPTHER

## 2020-01-29 ASSESSMENT — ENCOUNTER SYMPTOMS
RESPIRATORY NEGATIVE: 1
GASTROINTESTINAL NEGATIVE: 1
BACK PAIN: 1

## 2020-01-29 NOTE — PROGRESS NOTES
Subjective:      Patient ID: Charles Ocampo is a 80 y.o. male. HPI  Sandra Hoff is here for follow-up of CAF, S/P PM, HTN and Myasthenia Gravis. CAF - no chest pain, sob or palpitations. He remains on Eliquis and Metoprolol. His respiratory status is back to his baseline. SP PM - no further lightheadedness or syncope since PM placed. Myasthenia Gravis - mostly occular symptoms. He had a brief worsening for which he took Prednisone but is now off of the steroid. HTN - The patient denies any chest pain, shortness or breath, headaches or palpitations. There is no lower extremity edema. Continues to use the medication as prescribed (Amlodipine) and is having no side effects. Review of Systems   Constitutional: Negative. Eyes: Negative for visual disturbance. Respiratory: Negative. Cardiovascular: Negative. Gastrointestinal: Negative. Musculoskeletal: Positive for back pain. Psychiatric/Behavioral: Negative. 14 point ros otherwise negative. Objective:   Physical Exam  Eyes:      Pupils: Pupils are equal, round, and reactive to light. Cardiovascular:      Rate and Rhythm: Normal rate. Rhythm irregular. Pulmonary:      Effort: Pulmonary effort is normal.      Breath sounds: Normal breath sounds. Abdominal:      General: There is no distension. Palpations: Abdomen is soft. Tenderness: There is no abdominal tenderness. Musculoskeletal:      Comments: Minimal edema LLE. Skin:     General: Skin is warm and dry. Neurological:      General: No focal deficit present. Mental Status: He is alert. Psychiatric:         Mood and Affect: Mood normal.         Assessment:      1. Atrial fibrillation with slow ventricular response ([address]    2. Cardiac pacemaker recipient    3. Myasthenia gravis ([address]    4. Stage 3 chronic kidney disease ([address]            Plan:      Sandra Hoff was seen today for check-up.     Diagnoses and all orders for this visit:    Atrial fibrillation

## 2020-02-21 ENCOUNTER — TELEPHONE (OUTPATIENT)
Dept: INTERNAL MEDICINE CLINIC | Age: 85
End: 2020-02-21

## 2020-02-21 ENCOUNTER — OFFICE VISIT (OUTPATIENT)
Dept: INTERNAL MEDICINE CLINIC | Age: 85
End: 2020-02-21
Payer: MEDICARE

## 2020-02-21 VITALS
DIASTOLIC BLOOD PRESSURE: 80 MMHG | OXYGEN SATURATION: 98 % | WEIGHT: 195.8 LBS | SYSTOLIC BLOOD PRESSURE: 118 MMHG | TEMPERATURE: 98.8 F | HEART RATE: 76 BPM | RESPIRATION RATE: 18 BRPM | HEIGHT: 66 IN | BODY MASS INDEX: 31.47 KG/M2

## 2020-02-21 PROCEDURE — 4040F PNEUMOC VAC/ADMIN/RCVD: CPT | Performed by: NURSE PRACTITIONER

## 2020-02-21 PROCEDURE — G8417 CALC BMI ABV UP PARAM F/U: HCPCS | Performed by: NURSE PRACTITIONER

## 2020-02-21 PROCEDURE — G0444 DEPRESSION SCREEN ANNUAL: HCPCS | Performed by: NURSE PRACTITIONER

## 2020-02-21 PROCEDURE — 99213 OFFICE O/P EST LOW 20 MIN: CPT | Performed by: NURSE PRACTITIONER

## 2020-02-21 PROCEDURE — G8427 DOCREV CUR MEDS BY ELIG CLIN: HCPCS | Performed by: NURSE PRACTITIONER

## 2020-02-21 PROCEDURE — G8484 FLU IMMUNIZE NO ADMIN: HCPCS | Performed by: NURSE PRACTITIONER

## 2020-02-21 PROCEDURE — 1036F TOBACCO NON-USER: CPT | Performed by: NURSE PRACTITIONER

## 2020-02-21 PROCEDURE — 1123F ACP DISCUSS/DSCN MKR DOCD: CPT | Performed by: NURSE PRACTITIONER

## 2020-02-21 SDOH — ECONOMIC STABILITY: FOOD INSECURITY: WITHIN THE PAST 12 MONTHS, YOU WORRIED THAT YOUR FOOD WOULD RUN OUT BEFORE YOU GOT MONEY TO BUY MORE.: NEVER TRUE

## 2020-02-21 SDOH — ECONOMIC STABILITY: INCOME INSECURITY: HOW HARD IS IT FOR YOU TO PAY FOR THE VERY BASICS LIKE FOOD, HOUSING, MEDICAL CARE, AND HEATING?: NOT HARD AT ALL

## 2020-02-21 SDOH — ECONOMIC STABILITY: TRANSPORTATION INSECURITY
IN THE PAST 12 MONTHS, HAS THE LACK OF TRANSPORTATION KEPT YOU FROM MEDICAL APPOINTMENTS OR FROM GETTING MEDICATIONS?: NO

## 2020-02-21 SDOH — ECONOMIC STABILITY: TRANSPORTATION INSECURITY
IN THE PAST 12 MONTHS, HAS LACK OF TRANSPORTATION KEPT YOU FROM MEETINGS, WORK, OR FROM GETTING THINGS NEEDED FOR DAILY LIVING?: NO

## 2020-02-21 SDOH — ECONOMIC STABILITY: FOOD INSECURITY: WITHIN THE PAST 12 MONTHS, THE FOOD YOU BOUGHT JUST DIDN'T LAST AND YOU DIDN'T HAVE MONEY TO GET MORE.: NEVER TRUE

## 2020-02-21 ASSESSMENT — ENCOUNTER SYMPTOMS
CHEST TIGHTNESS: 0
SHORTNESS OF BREATH: 0
EYE PAIN: 0
COUGH: 0

## 2020-02-21 ASSESSMENT — PATIENT HEALTH QUESTIONNAIRE - PHQ9
8. MOVING OR SPEAKING SO SLOWLY THAT OTHER PEOPLE COULD HAVE NOTICED. OR THE OPPOSITE, BEING SO FIGETY OR RESTLESS THAT YOU HAVE BEEN MOVING AROUND A LOT MORE THAN USUAL: 0
SUM OF ALL RESPONSES TO PHQ9 QUESTIONS 1 & 2: 3
SUM OF ALL RESPONSES TO PHQ QUESTIONS 1-9: 5
6. FEELING BAD ABOUT YOURSELF - OR THAT YOU ARE A FAILURE OR HAVE LET YOURSELF OR YOUR FAMILY DOWN: 0
1. LITTLE INTEREST OR PLEASURE IN DOING THINGS: 1
10. IF YOU CHECKED OFF ANY PROBLEMS, HOW DIFFICULT HAVE THESE PROBLEMS MADE IT FOR YOU TO DO YOUR WORK, TAKE CARE OF THINGS AT HOME, OR GET ALONG WITH OTHER PEOPLE: 0
2. FEELING DOWN, DEPRESSED OR HOPELESS: 2
SUM OF ALL RESPONSES TO PHQ QUESTIONS 1-9: 5
3. TROUBLE FALLING OR STAYING ASLEEP: 1
7. TROUBLE CONCENTRATING ON THINGS, SUCH AS READING THE NEWSPAPER OR WATCHING TELEVISION: 0
4. FEELING TIRED OR HAVING LITTLE ENERGY: 0
5. POOR APPETITE OR OVEREATING: 1
9. THOUGHTS THAT YOU WOULD BE BETTER OFF DEAD, OR OF HURTING YOURSELF: 0

## 2020-02-21 NOTE — PROGRESS NOTES
MD   predniSONE (DELTASONE) 20 MG tablet Take 3 tablets by mouth daily Yes Donny Oliveira MD   pantoprazole (PROTONIX) 40 MG tablet Take 40 mg by mouth daily Yes Historical Provider, MD        Social History     Tobacco Use    Smoking status: Former Smoker     Last attempt to quit: 1950     Years since quittin.8    Smokeless tobacco: Never Used    Tobacco comment: smoked as teenager   Substance Use Topics    Alcohol use: No        Vitals:    20 1512   BP: 118/80   Site: Left Upper Arm   Position: Sitting   Cuff Size: Medium Adult   Pulse: 76   Resp: 18   Temp: 98.8 °F (37.1 °C)   SpO2: 98%   Weight: 195 lb 12.8 oz (88.8 kg)   Height: 5' 6\" (1.676 m)     Estimated body mass index is 31.6 kg/m² as calculated from the following:    Height as of this encounter: 5' 6\" (1.676 m). Weight as of this encounter: 195 lb 12.8 oz (88.8 kg). Physical Exam  Vitals signs reviewed. Constitutional:       General: He is not in acute distress. Appearance: Normal appearance. He is not ill-appearing, toxic-appearing or diaphoretic. HENT:      Head: Normocephalic and atraumatic. Eyes:      General:         Right eye: No discharge. Left eye: No discharge. Extraocular Movements: Extraocular movements intact. Conjunctiva/sclera: Conjunctivae normal.      Pupils: Pupils are equal, round, and reactive to light. Neck:      Musculoskeletal: Normal range of motion and neck supple. Cardiovascular:      Rate and Rhythm: Rhythm irregularly irregular. Pulses:           Radial pulses are 2+ on the right side and 2+ on the left side. Pulmonary:      Effort: Pulmonary effort is normal. No respiratory distress. Breath sounds: Normal breath sounds. Musculoskeletal: Normal range of motion. General: No swelling or tenderness. Right lower leg: No edema. Left lower leg: No edema. Skin:     General: Skin is warm and dry.    Neurological:      General: No focal deficit

## 2020-02-25 ENCOUNTER — TELEPHONE (OUTPATIENT)
Dept: INTERNAL MEDICINE CLINIC | Age: 85
End: 2020-02-25

## 2020-02-27 ENCOUNTER — HOSPITAL ENCOUNTER (OUTPATIENT)
Dept: ULTRASOUND IMAGING | Age: 85
Discharge: HOME | End: 2020-02-27
Payer: MEDICARE

## 2020-02-27 PROCEDURE — 76770 US EXAM ABDO BACK WALL COMP: CPT

## 2020-04-09 ENCOUNTER — TELEPHONE (OUTPATIENT)
Dept: INTERNAL MEDICINE CLINIC | Age: 85
End: 2020-04-09

## 2020-04-09 NOTE — TELEPHONE ENCOUNTER
Pt said Spring ordered Eliquis 2.5 mg but only ordered #90. He said he takes 1 po bid. He asked if she would send in an Rx for a 90 day supply so his family doesn't have to go to the pharmacy every month.      Kaleb Campos

## 2020-06-03 ENCOUNTER — NURSE ONLY (OUTPATIENT)
Dept: CARDIOLOGY CLINIC | Age: 85
End: 2020-06-03
Payer: MEDICARE

## 2020-06-03 PROCEDURE — 93296 REM INTERROG EVL PM/IDS: CPT | Performed by: INTERNAL MEDICINE

## 2020-06-03 PROCEDURE — 93294 REM INTERROG EVL PM/LDLS PM: CPT | Performed by: INTERNAL MEDICINE

## 2020-06-03 NOTE — LETTER
[address] [facility] [phone]  [address] [place] [address] [phone]    Pacemaker/Defibrillator Clinic          06/03/20        [address] [facility] [address]        Dear Jonathan Pavon    This letter is to inform you that we received the transmission from your monitor at home that checks your implanted heart device. The next date your monitor will automatically transmit will be 9-8-20. If your report needs attention we will notify you. Your device and monitor are wireless and most transmit cellularly, but please periodically check your monitor is still plugged in to the electrical outlet. If you still use the telephone land line to send please ensure the connection to the phone salvador is secure. This will help to ensure successful automatic transmissions in the future. Also, the monitor needs to be close to you while sleeping at night. Please be aware that the remote device transmission sites are periodically monitored only during regular business hours during which simultaneous in-office device clinics are being run. If your transmission requires attention, we will contact you as soon as possible. Thank you.             [address]

## 2020-06-24 NOTE — TELEPHONE ENCOUNTER
LAST OV 2/21/2020  Future Appointments   Date Time Provider Davey Winkler   9/8/2020 12:00 PM SCHEDULE, [facility]SM[facility]

## 2020-06-25 ENCOUNTER — TELEPHONE (OUTPATIENT)
Dept: INTERNAL MEDICINE CLINIC | Age: 85
End: 2020-06-25

## 2020-06-25 RX ORDER — AMLODIPINE BESYLATE 2.5 MG/1
TABLET ORAL
Qty: 30 TABLET | OUTPATIENT
Start: 2020-06-25

## 2020-06-25 NOTE — TELEPHONE ENCOUNTER
Called and set up VV with pt for tomorrow at 1130. Pt states he is worried that something may happened between now and tomorrow with out his medication. Pt states he is almost out of most of his medications and is needing refills on the others as well.     Please advise

## 2020-06-26 ENCOUNTER — TELEPHONE (OUTPATIENT)
Dept: INTERNAL MEDICINE CLINIC | Age: 85
End: 2020-06-26

## 2020-06-26 ENCOUNTER — VIRTUAL VISIT (OUTPATIENT)
Dept: INTERNAL MEDICINE CLINIC | Age: 85
End: 2020-06-26
Payer: MEDICARE

## 2020-06-26 PROCEDURE — G8427 DOCREV CUR MEDS BY ELIG CLIN: HCPCS | Performed by: NURSE PRACTITIONER

## 2020-06-26 PROCEDURE — 1123F ACP DISCUSS/DSCN MKR DOCD: CPT | Performed by: NURSE PRACTITIONER

## 2020-06-26 PROCEDURE — 4040F PNEUMOC VAC/ADMIN/RCVD: CPT | Performed by: NURSE PRACTITIONER

## 2020-06-26 PROCEDURE — 1036F TOBACCO NON-USER: CPT | Performed by: NURSE PRACTITIONER

## 2020-06-26 PROCEDURE — 99442 PR PHYS/QHP TELEPHONE EVALUATION 11-20 MIN: CPT | Performed by: NURSE PRACTITIONER

## 2020-06-26 PROCEDURE — G8417 CALC BMI ABV UP PARAM F/U: HCPCS | Performed by: NURSE PRACTITIONER

## 2020-06-26 NOTE — TELEPHONE ENCOUNTER
----- Message from [place], [address] - CNP sent at 6/26/2020 12:04 PM EDT -----  Regarding: Radha Phelps number  Please call patient and give him the number to make an appt with [facility] for his chronic back pain ,

## 2020-06-26 NOTE — PROGRESS NOTES
TELEHEALTH EVALUATION -- Audio/Visual (During NDCAS-32 public health emergency)      Gretel Hines (:  12/15/1929) is being evaluated by a Virtual Visit ([phone visit) encounter to address concerns as mentioned above. A caregiver was present when appropriate. Due to this being a TeleHealth encounter (During EZMJP-08 public health emergency), evaluation of the following organ systems was limited: Vitals/Constitutional/EENT/Resp/CV/GI//MS/Neuro/Skin/Heme-Lymph-Imm. Pursuant to the emergency declaration under the [address] [place] [address], [address] [place] [address] authority and the [employer] Act, this Virtual Visit was conducted with patient's (and/or legal guardian's) consent, to reduce the patient's risk of exposure to COVID-19 and provide necessary medical care. The patient (and/or legal guardian) has also been advised to contact this office for worsening conditions or problems, and seek emergency medical treatment and/or call 911 if deemed necessary. Gretel Hines is a 80 y.o. male evaluated via telephone on 2020. Consent:  He and/or health care decision maker is aware that that he may receive a bill for this telephone service, depending on his insurance coverage, and has provided verbal consent to proceed: Yes      Documentation:  I communicated with the patient and/or health care decision maker about establish care. CC: HTN. HLD, arthritis back pain, Afib, CKD    HTN:  Denies SOB, chest pain , difficulty breathing , LE edema, dizziness, headaches , syncope. Tolerating medication well. HLD: States that he is tolerating medications well. Aware that he needs to lose weight and manage diet. Will start taking at night . Takes in the am now . Arthritis back : back pain is worsen . Does have a lot of pain with moving around , Feels that \" it will be what kills me\"  .  Did have his back operated on years ago by

## 2020-06-30 RX ORDER — AMLODIPINE BESYLATE 2.5 MG/1
TABLET ORAL
Qty: 90 TABLET | Refills: 0 | Status: SHIPPED | OUTPATIENT
Start: 2020-06-30 | End: 2020-09-10 | Stop reason: SDUPTHER

## 2020-07-06 ENCOUNTER — HOSPITAL ENCOUNTER (OUTPATIENT)
Dept: GENERAL RADIOLOGY | Age: 85
Discharge: HOME | End: 2020-07-06
Payer: MEDICARE

## 2020-07-06 ENCOUNTER — HOSPITAL ENCOUNTER (OUTPATIENT)
Age: 85
Discharge: HOME | End: 2020-07-06
Payer: MEDICARE

## 2020-07-06 PROCEDURE — 72110 X-RAY EXAM L-2 SPINE 4/>VWS: CPT

## 2020-07-06 PROCEDURE — 72050 X-RAY EXAM NECK SPINE 4/5VWS: CPT

## 2020-07-06 PROCEDURE — 72070 X-RAY EXAM THORAC SPINE 2VWS: CPT

## 2020-07-16 ENCOUNTER — HOSPITAL ENCOUNTER (OUTPATIENT)
Dept: CT IMAGING | Age: 85
Discharge: HOME | End: 2020-07-16
Payer: MEDICARE

## 2020-07-16 PROCEDURE — 72131 CT LUMBAR SPINE W/O DYE: CPT

## 2020-07-16 PROCEDURE — 72128 CT CHEST SPINE W/O DYE: CPT

## 2020-09-08 ENCOUNTER — NURSE ONLY (OUTPATIENT)
Dept: CARDIOLOGY CLINIC | Age: 85
End: 2020-09-08
Payer: MEDICARE

## 2020-09-08 PROCEDURE — 93294 REM INTERROG EVL PM/LDLS PM: CPT | Performed by: INTERNAL MEDICINE

## 2020-09-08 PROCEDURE — 93296 REM INTERROG EVL PM/IDS: CPT | Performed by: INTERNAL MEDICINE

## 2020-09-08 NOTE — PROGRESS NOTES
We received remote transmission from patient's monitor at home. Transmission shows normal sensing and pacing function. Pt has known AF and remains on Eliquis. EP physician will review. See interrogation under cardiology tab in the [address] [place] [address] [address] field for more details.

## 2020-09-08 NOTE — LETTER
[address] [facility] [phone]  [address] [facility],[address] [phone]    Pacemaker/Defibrillator Clinic          09/08/20        [address] [address]        Dear Chance Reilly    This letter is to inform you that we received the transmission from your monitor at home that checks your implanted heart device. The next date your monitor will automatically transmit will be 12-9-20. If your report needs attention we will notify you. Your device and monitor are wireless and most transmit cellularly, but please periodically check your monitor is still plugged in to the electrical outlet. If you still use the telephone land line to send please ensure the connection to the phone salvador is secure. This will help to ensure successful automatic transmissions in the future. Also, the monitor needs to be close to you while sleeping at night. Please be aware that the remote device transmission sites are periodically monitored only during regular business hours during which simultaneous in-office device clinics are being run. If your transmission requires attention, we will contact you as soon as possible. Thank you.             [facility]

## 2020-09-10 ENCOUNTER — OFFICE VISIT (OUTPATIENT)
Dept: INTERNAL MEDICINE CLINIC | Age: 85
End: 2020-09-10
Payer: MEDICARE

## 2020-09-10 VITALS
HEART RATE: 64 BPM | DIASTOLIC BLOOD PRESSURE: 72 MMHG | WEIGHT: 186.2 LBS | BODY MASS INDEX: 30.05 KG/M2 | TEMPERATURE: 98 F | RESPIRATION RATE: 18 BRPM | OXYGEN SATURATION: 97 % | SYSTOLIC BLOOD PRESSURE: 130 MMHG

## 2020-09-10 LAB
A/G RATIO: 2.1 (ref 1.1–2.2)
ALBUMIN SERPL-MCNC: 3.6 G/DL (ref 3.4–5)
ALP BLD-CCNC: 63 U/L (ref 40–129)
ALT SERPL-CCNC: <5 U/L (ref 10–40)
ANION GAP SERPL CALCULATED.3IONS-SCNC: 12 MMOL/L (ref 3–16)
AST SERPL-CCNC: 10 U/L (ref 15–37)
BASOPHILS ABSOLUTE: 0 K/UL (ref 0–0.2)
BASOPHILS RELATIVE PERCENT: 0.6 %
BILIRUB SERPL-MCNC: 0.5 MG/DL (ref 0–1)
BUN BLDV-MCNC: 27 MG/DL (ref 7–20)
CALCIUM SERPL-MCNC: 8.7 MG/DL (ref 8.3–10.6)
CHLORIDE BLD-SCNC: 109 MMOL/L (ref 99–110)
CO2: 23 MMOL/L (ref 21–32)
CREAT SERPL-MCNC: 1.5 MG/DL (ref 0.8–1.3)
EOSINOPHILS ABSOLUTE: 0 K/UL (ref 0–0.6)
EOSINOPHILS RELATIVE PERCENT: 1 %
GFR AFRICAN AMERICAN: 53
GFR NON-AFRICAN AMERICAN: 44
GLOBULIN: 1.7 G/DL
GLUCOSE BLD-MCNC: 120 MG/DL (ref 70–99)
HCT VFR BLD CALC: 26.7 % (ref 40.5–52.5)
HEMOGLOBIN: 8.5 G/DL (ref 13.5–17.5)
LYMPHOCYTES ABSOLUTE: 0.5 K/UL (ref 1–5.1)
LYMPHOCYTES RELATIVE PERCENT: 20.7 %
MCH RBC QN AUTO: 26.8 PG (ref 26–34)
MCHC RBC AUTO-ENTMCNC: 31.8 G/DL (ref 31–36)
MCV RBC AUTO: 84.4 FL (ref 80–100)
MONOCYTES ABSOLUTE: 0.2 K/UL (ref 0–1.3)
MONOCYTES RELATIVE PERCENT: 6.9 %
NEUTROPHILS ABSOLUTE: 1.7 K/UL (ref 1.7–7.7)
NEUTROPHILS RELATIVE PERCENT: 70.8 %
PDW BLD-RTO: 17.2 % (ref 12.4–15.4)
PLATELET # BLD: 129 K/UL (ref 135–450)
PMV BLD AUTO: 8.9 FL (ref 5–10.5)
POTASSIUM SERPL-SCNC: 4.1 MMOL/L (ref 3.5–5.1)
RBC # BLD: 3.16 M/UL (ref 4.2–5.9)
SODIUM BLD-SCNC: 144 MMOL/L (ref 136–145)
TOTAL PROTEIN: 5.3 G/DL (ref 6.4–8.2)
WBC # BLD: 2.5 K/UL (ref 4–11)

## 2020-09-10 PROCEDURE — 1036F TOBACCO NON-USER: CPT | Performed by: NURSE PRACTITIONER

## 2020-09-10 PROCEDURE — 90653 IIV ADJUVANT VACCINE IM: CPT | Performed by: NURSE PRACTITIONER

## 2020-09-10 PROCEDURE — G8427 DOCREV CUR MEDS BY ELIG CLIN: HCPCS | Performed by: NURSE PRACTITIONER

## 2020-09-10 PROCEDURE — G0008 ADMIN INFLUENZA VIRUS VAC: HCPCS | Performed by: NURSE PRACTITIONER

## 2020-09-10 PROCEDURE — 99214 OFFICE O/P EST MOD 30 MIN: CPT | Performed by: NURSE PRACTITIONER

## 2020-09-10 PROCEDURE — 1123F ACP DISCUSS/DSCN MKR DOCD: CPT | Performed by: NURSE PRACTITIONER

## 2020-09-10 PROCEDURE — G8417 CALC BMI ABV UP PARAM F/U: HCPCS | Performed by: NURSE PRACTITIONER

## 2020-09-10 PROCEDURE — G0009 ADMIN PNEUMOCOCCAL VACCINE: HCPCS | Performed by: NURSE PRACTITIONER

## 2020-09-10 PROCEDURE — 90732 PPSV23 VACC 2 YRS+ SUBQ/IM: CPT | Performed by: NURSE PRACTITIONER

## 2020-09-10 PROCEDURE — 4040F PNEUMOC VAC/ADMIN/RCVD: CPT | Performed by: NURSE PRACTITIONER

## 2020-09-10 PROCEDURE — 36415 COLL VENOUS BLD VENIPUNCTURE: CPT | Performed by: NURSE PRACTITIONER

## 2020-09-10 RX ORDER — AMLODIPINE BESYLATE 2.5 MG/1
TABLET ORAL
Qty: 90 TABLET | Refills: 2 | Status: SHIPPED | OUTPATIENT
Start: 2020-09-10 | End: 2021-11-09

## 2020-09-10 RX ORDER — DOXAZOSIN MESYLATE 4 MG/1
4 TABLET ORAL NIGHTLY
Qty: 90 TABLET | Refills: 2 | Status: SHIPPED | OUTPATIENT
Start: 2020-09-10 | End: 2021-11-16 | Stop reason: SDUPTHER

## 2020-09-10 RX ORDER — ATORVASTATIN CALCIUM 10 MG/1
TABLET, FILM COATED ORAL
Qty: 90 TABLET | Refills: 2 | Status: SHIPPED | OUTPATIENT
Start: 2020-09-10 | End: 2021-08-30 | Stop reason: SDUPTHER

## 2020-09-10 RX ORDER — METOPROLOL SUCCINATE 25 MG/1
TABLET, EXTENDED RELEASE ORAL
Qty: 90 TABLET | Refills: 2 | Status: SHIPPED | OUTPATIENT
Start: 2020-09-10 | End: 2021-10-20

## 2020-09-10 ASSESSMENT — ENCOUNTER SYMPTOMS
BLURRED VISION: 0
SHORTNESS OF BREATH: 0

## 2020-09-10 NOTE — PROGRESS NOTES
9/10/2020     Coral Parra (:  12/15/1929) is a 80 y.o. male, here for evaluation of the following medical concerns:    Chief Complaint   Patient presents with    3 Month Follow-Up     Afib : Takes eliquis and metoprolol . Does see Dr. Joesphine Merlin. Denies palpation , sob, chest pain , syncope . No recent falls . No beeding issues . Pacemaker in place. Check 20. Hypertension   This is a chronic problem. The current episode started more than 1 year ago. The problem has been resolved since onset. The problem is controlled. Pertinent negatives include no anxiety, blurred vision, chest pain, headaches, malaise/fatigue, palpitations, peripheral edema or shortness of breath. There are no associated agents to hypertension. Risk factors for coronary artery disease include dyslipidemia, obesity and male gender. Past treatments include calcium channel blockers and beta blockers. The current treatment provides significant improvement. There are no compliance problems. Hypertensive end-organ damage includes kidney disease. Identifiable causes of hypertension include chronic renal disease. Hyperlipidemia   This is a chronic problem. The current episode started more than 1 year ago. The problem is controlled. Recent lipid tests were reviewed and are normal. Exacerbating diseases include chronic renal disease and obesity. There are no known factors aggravating his hyperlipidemia. Pertinent negatives include no chest pain, focal sensory loss, focal weakness, leg pain, myalgias or shortness of breath. Current antihyperlipidemic treatment includes statins. The current treatment provides significant improvement of lipids. There are no compliance problems. Risk factors for coronary artery disease include dyslipidemia, hypertension, male sex and obesity. CKD: Has seen DR Thomas Landis . He states that CKD is stable . He is due to see him again in a few months. Anemia : patient denies any dizziness, pallor, SOB .  Has had low counts for awhile . Does not take iron pills     Review of Systems   Constitutional: Negative for chills, fatigue, fever and malaise/fatigue. Eyes: Negative for blurred vision. Respiratory: Negative for shortness of breath. Cardiovascular: Negative for chest pain and palpitations. Musculoskeletal: Negative for myalgias. Neurological: Negative for focal weakness and headaches. Prior to Visit Medications    Medication Sig Taking? Authorizing Provider   metoprolol succinate (TOPROL XL) 25 MG extended release tablet TAKE ONE TABLET BY MOUTH DAILY Yes MADHU Grant CNP   atorvastatin (LIPITOR) 10 MG tablet TAKE ONE TABLET BY MOUTH DAILY Yes MADHU Grant CNP   apixaban (ELIQUIS) 2.5 MG TABS tablet TAKE ONE TABLET BY MOUTH TWICE A DAY Yes MADHU Grant CNP   amLODIPine (NORVASC) 2.5 MG tablet TAKE ONE TABLET BY MOUTH DAILY Yes MADHU Grant CNP   doxazosin (CARDURA) 4 MG tablet Take 1 tablet by mouth nightly Yes MADHU Grant CNP   predniSONE (DELTASONE) 20 MG tablet Take 3 tablets by mouth daily Yes Endy Fonseca MD   pantoprazole (PROTONIX) 40 MG tablet Take 40 mg by mouth daily Yes Historical Provider, MD        Social History     Tobacco Use    Smoking status: Former Smoker     Last attempt to quit: 1950     Years since quittin.4    Smokeless tobacco: Never Used    Tobacco comment: smoked as teenager   Substance Use Topics    Alcohol use: No        Vitals:    09/10/20 1120   BP: 130/72   Site: Left Upper Arm   Position: Sitting   Cuff Size: Medium Adult   Pulse: 64   Resp: 18   Temp: 98 °F (36.7 °C)   SpO2: 97%   Weight: 186 lb 3.2 oz (84.5 kg)     Estimated body mass index is 30.05 kg/m² as calculated from the following:    Height as of 20: 5' 6\" (1.676 m). Weight as of this encounter: 186 lb 3.2 oz (84.5 kg). Physical Exam  Vitals signs reviewed. Constitutional:       General: He is not in acute distress.      Appearance: DAILY    Stage 3 chronic kidney disease ([facility] [address] stable   Followed by dr Keenan Bhatt fibrillation with slow ventricular response ([facility] [address] stable   Cardiac pacemaker recipient  Afib:   SVT  Followed an managed by Dr Kael Jovel . Has pacemaker checked regularly . asymptomatic   Takes BB and eliquis . doxazosin (CARDURA) 4 MG tablet; Take 1 tablet by mouth nightly  -     metoprolol succinate (TOPROL XL) 25 MG extended release tablet; TAKE ONE TABLET BY MOUTH DAILY    -     apixaban (ELIQUIS) 2.5 MG TABS tablet; TAKE ONE TABLET BY MOUTH TWICE A DAY      Vaccine: flu and pneumon 23 today   -   -    Lives at [address] [place] [address]. [facility] here with his daughter today. Return in about 3 months (around 12/10/2020). All questions addresses and answered . Patient agrees with plan of care. An electronic signature was used to authenticate this note.     --Frankey Gentleman, APRN - CNP on 9/10/2020 at 11:56 AM

## 2020-11-03 PROBLEM — R55 SYNCOPE AND COLLAPSE: Status: RESOLVED | Noted: 2019-05-07 | Resolved: 2020-11-03

## 2020-12-04 PROBLEM — D50.9 IRON DEFICIENCY ANEMIA, UNSPECIFIED: Status: ACTIVE | Noted: 2020-12-04

## 2020-12-04 RX ORDER — HEPARIN SODIUM (PORCINE) LOCK FLUSH IV SOLN 100 UNIT/ML 100 UNIT/ML
500 SOLUTION INTRAVENOUS PRN
Status: CANCELLED | OUTPATIENT
Start: 2020-12-10

## 2020-12-04 RX ORDER — DIPHENHYDRAMINE HYDROCHLORIDE 50 MG/ML
50 INJECTION INTRAMUSCULAR; INTRAVENOUS ONCE
Status: CANCELLED | OUTPATIENT
Start: 2020-12-10

## 2020-12-04 RX ORDER — SODIUM CHLORIDE 0.9 % (FLUSH) 0.9 %
10 SYRINGE (ML) INJECTION PRN
Status: CANCELLED | OUTPATIENT
Start: 2020-12-10

## 2020-12-04 RX ORDER — SODIUM CHLORIDE 9 MG/ML
INJECTION, SOLUTION INTRAVENOUS CONTINUOUS
Status: CANCELLED | OUTPATIENT
Start: 2020-12-10

## 2020-12-04 RX ORDER — EPINEPHRINE 1 MG/ML
0.3 INJECTION, SOLUTION, CONCENTRATE INTRAVENOUS PRN
Status: CANCELLED | OUTPATIENT
Start: 2020-12-10

## 2020-12-04 RX ORDER — SODIUM CHLORIDE 0.9 % (FLUSH) 0.9 %
5 SYRINGE (ML) INJECTION PRN
Status: CANCELLED | OUTPATIENT
Start: 2020-12-10

## 2020-12-04 RX ORDER — METHYLPREDNISOLONE SODIUM SUCCINATE 125 MG/2ML
125 INJECTION, POWDER, LYOPHILIZED, FOR SOLUTION INTRAMUSCULAR; INTRAVENOUS ONCE
Status: CANCELLED | OUTPATIENT
Start: 2020-12-10

## 2020-12-09 ENCOUNTER — NURSE ONLY (OUTPATIENT)
Dept: CARDIOLOGY CLINIC | Age: 85
End: 2020-12-09
Payer: MEDICARE

## 2020-12-09 PROCEDURE — 93296 REM INTERROG EVL PM/IDS: CPT | Performed by: INTERNAL MEDICINE

## 2020-12-09 PROCEDURE — 93294 REM INTERROG EVL PM/LDLS PM: CPT | Performed by: INTERNAL MEDICINE

## 2020-12-09 NOTE — LETTER
[address] [facility] [phone]  [address] [facility],[address] [phone]    Pacemaker/Defibrillator Clinic          12/09/20        [address] [facility] [address]        Dear Ana Arreola    This letter is to inform you that we received the transmission from your monitor at home that checks your implanted heart device. The next date your monitor will automatically transmit will be 3-15-21. If your report needs attention we will notify you. Your device and monitor are wireless and most transmit cellularly, but please periodically check your monitor is still plugged in to the electrical outlet. If you still use the telephone land line to send please ensure the connection to the phone salvador is secure. This will help to ensure successful automatic transmissions in the future. Also, the monitor needs to be close to you while sleeping at night. Please be aware that the remote device transmission sites are periodically monitored only during regular business hours during which simultaneous in-office device clinics are being run. If your transmission requires attention, we will contact you as soon as possible. Thank you.             [facility]

## 2020-12-09 NOTE — PROGRESS NOTES
We received remote transmission from patient's monitor at home. Transmission shows normal sensing and pacing function. Pt is on Eliquis for known AF. EP physician will review. See interrogation under cardiology tab in the [address] [place] [address] [address] field for more details.

## 2020-12-17 ENCOUNTER — OFFICE VISIT (OUTPATIENT)
Dept: INTERNAL MEDICINE CLINIC | Age: 85
End: 2020-12-17
Payer: MEDICARE

## 2020-12-17 VITALS
HEART RATE: 66 BPM | WEIGHT: 188.6 LBS | DIASTOLIC BLOOD PRESSURE: 66 MMHG | OXYGEN SATURATION: 99 % | TEMPERATURE: 97.1 F | RESPIRATION RATE: 12 BRPM | SYSTOLIC BLOOD PRESSURE: 139 MMHG | BODY MASS INDEX: 30.44 KG/M2

## 2020-12-17 PROBLEM — I95.9 HYPOTENSION: Status: RESOLVED | Noted: 2018-04-18 | Resolved: 2020-12-17

## 2020-12-17 PROBLEM — S02.85XA CLOSED FRACTURE OF ORBITAL WALL (HCC): Status: RESOLVED | Noted: 2019-05-07 | Resolved: 2020-12-17

## 2020-12-17 PROBLEM — K92.2 GI BLEEDING: Status: RESOLVED | Noted: 2018-04-18 | Resolved: 2020-12-17

## 2020-12-17 PROBLEM — R73.9 HYPERGLYCEMIA: Status: RESOLVED | Noted: 2019-05-07 | Resolved: 2020-12-17

## 2020-12-17 PROBLEM — K92.2 UGI BLEED: Status: RESOLVED | Noted: 2018-04-23 | Resolved: 2020-12-17

## 2020-12-17 PROCEDURE — 1123F ACP DISCUSS/DSCN MKR DOCD: CPT | Performed by: NURSE PRACTITIONER

## 2020-12-17 PROCEDURE — 99214 OFFICE O/P EST MOD 30 MIN: CPT | Performed by: NURSE PRACTITIONER

## 2020-12-17 PROCEDURE — 1036F TOBACCO NON-USER: CPT | Performed by: NURSE PRACTITIONER

## 2020-12-17 PROCEDURE — 4040F PNEUMOC VAC/ADMIN/RCVD: CPT | Performed by: NURSE PRACTITIONER

## 2020-12-17 PROCEDURE — G8417 CALC BMI ABV UP PARAM F/U: HCPCS | Performed by: NURSE PRACTITIONER

## 2020-12-17 PROCEDURE — G8427 DOCREV CUR MEDS BY ELIG CLIN: HCPCS | Performed by: NURSE PRACTITIONER

## 2020-12-17 PROCEDURE — G8482 FLU IMMUNIZE ORDER/ADMIN: HCPCS | Performed by: NURSE PRACTITIONER

## 2020-12-17 ASSESSMENT — ENCOUNTER SYMPTOMS
SHORTNESS OF BREATH: 0
BLURRED VISION: 0

## 2020-12-17 NOTE — PROGRESS NOTES
2020     Mingo Thompson (:  12/15/1929) is a 80 y.o. male, here for evaluation of the following medical concerns:    Chief Complaint   Patient presents with    Hypertension     3 month follow up    Hyperlipidemia    Atrial Fibrillation     Afib : Takes eliquis and metoprolol . Does see Dr. Annabelle Mckeon. Denies palpation , sob, chest pain , syncope . No recent falls . No beeding issues . Pacemaker in place. .     Hypertension  This is a chronic problem. The current episode started more than 1 year ago. The problem has been resolved since onset. The problem is controlled. Pertinent negatives include no anxiety, blurred vision, chest pain, headaches, malaise/fatigue, palpitations, peripheral edema or shortness of breath. There are no associated agents to hypertension. Risk factors for coronary artery disease include dyslipidemia, obesity and male gender. Past treatments include calcium channel blockers and beta blockers. The current treatment provides significant improvement. There are no compliance problems. Hypertensive end-organ damage includes kidney disease. Identifiable causes of hypertension include chronic renal disease. Hyperlipidemia  This is a chronic problem. The current episode started more than 1 year ago. The problem is controlled. Recent lipid tests were reviewed and are normal. Exacerbating diseases include chronic renal disease and obesity. There are no known factors aggravating his hyperlipidemia. Pertinent negatives include no chest pain, focal sensory loss, focal weakness, leg pain, myalgias or shortness of breath. Current antihyperlipidemic treatment includes statins. The current treatment provides significant improvement of lipids. There are no compliance problems. Risk factors for coronary artery disease include dyslipidemia, hypertension, male sex and obesity. CKD: Has seen DR Pancho Buenrostro . He states that CKD is stable . He is due to see him again in a few months.   He states that TempSrc: Oral    SpO2: 98% 99%   Weight: 188 lb 9.6 oz (85.5 kg)      Estimated body mass index is 30.44 kg/m² as calculated from the following:    Height as of 2/21/20: 5' 6\" (1.676 m). Weight as of this encounter: 188 lb 9.6 oz (85.5 kg). Physical Exam  Vitals signs reviewed. Constitutional:       General: He is not in acute distress. Appearance: Normal appearance. He is not ill-appearing, toxic-appearing or diaphoretic. HENT:      Head: Normocephalic and atraumatic. Neck:      Musculoskeletal: Normal range of motion and neck supple. Vascular: No carotid bruit. Cardiovascular:      Rate and Rhythm: Normal rate. Rhythm irregular. Pulses: Normal pulses. Heart sounds: Normal heart sounds. Pulmonary:      Effort: Pulmonary effort is normal. No respiratory distress. Breath sounds: Normal breath sounds. Abdominal:      General: Bowel sounds are normal. There is no distension. Palpations: Abdomen is soft. There is no mass. Tenderness: There is no abdominal tenderness. Musculoskeletal:      Right lower leg: No edema. Left lower leg: No edema. Lymphadenopathy:      Cervical: No cervical adenopathy. Skin:     General: Skin is warm and dry. Capillary Refill: Capillary refill takes less than 2 seconds. Neurological:      General: No focal deficit present. Mental Status: He is alert and oriented to person, place, and time. Psychiatric:         Mood and Affect: Mood normal.         Behavior: Behavior normal.         Thought Content: Thought content normal.         Judgment: Judgment normal.         ASSESSMENT/PLAN:  Kaylie Chapa was seen today for 3 month follow-up. Diagnoses and all orders for this visit:    Essential hypertension, stable  Currently taking  amlodipine  Reports compliance   No dosage changes , will monitor   -    Iron deficiency anemia, unspecified iron deficiency anemia type  Referral placed by Dr Angy Connor for iron infusion therapy . Stefani called and pt is schedule for Dec. 23 for infusion. Referral given for Dr Caitlin Coles - Andie to call also. Mixed hyperlipidemia, stable   Currently taking  lipitor   Reports compliance   No dosage changes , will monitor   -     Stage 3 chronic kidney disease ([facility] [address] stable   Followed by dr Casey Villegas fibrillation with slow ventricular response ([facility] [address] stable   Cardiac pacemaker recipient  Afib:   SVT  Followed an managed by Dr Shiva Viera . Has pacemaker checked regularly . asymptomatic   Takes BB and eliquis     -    Lives at [facility] here with his daughter today. Return in about 3 months (around 3/17/2021). All questions addresses and answered . Patient agrees with plan of care. An electronic signature was used to authenticate this note.     --Collette Gene, APRN - CNP on 12/17/2020 at 1:10 PM

## 2020-12-23 ENCOUNTER — HOSPITAL ENCOUNTER (OUTPATIENT)
Dept: INFUSION THERAPY | Age: 85
Setting detail: INFUSION SERIES
Discharge: HOME | End: 2020-12-23
Payer: MEDICARE

## 2020-12-23 VITALS
SYSTOLIC BLOOD PRESSURE: 125 MMHG | RESPIRATION RATE: 16 BRPM | TEMPERATURE: 97.3 F | HEART RATE: 62 BPM | DIASTOLIC BLOOD PRESSURE: 65 MMHG | OXYGEN SATURATION: 98 %

## 2020-12-23 DIAGNOSIS — D50.9 IRON DEFICIENCY ANEMIA, UNSPECIFIED IRON DEFICIENCY ANEMIA TYPE: ICD-10-CM

## 2020-12-23 DIAGNOSIS — E87.5 HYPERKALEMIA: Primary | ICD-10-CM

## 2020-12-23 PROCEDURE — 2580000003 HC RX 258: Performed by: INTERNAL MEDICINE

## 2020-12-23 PROCEDURE — 6360000002 HC RX W HCPCS: Performed by: INTERNAL MEDICINE

## 2020-12-23 PROCEDURE — 96365 THER/PROPH/DIAG IV INF INIT: CPT

## 2020-12-23 RX ORDER — DIPHENHYDRAMINE HYDROCHLORIDE 50 MG/ML
50 INJECTION INTRAMUSCULAR; INTRAVENOUS ONCE
Status: CANCELLED | OUTPATIENT
Start: 2020-12-30 | End: 2020-12-30

## 2020-12-23 RX ORDER — METHYLPREDNISOLONE SODIUM SUCCINATE 125 MG/2ML
125 INJECTION, POWDER, LYOPHILIZED, FOR SOLUTION INTRAMUSCULAR; INTRAVENOUS ONCE
Status: CANCELLED | OUTPATIENT
Start: 2020-12-30 | End: 2020-12-30

## 2020-12-23 RX ORDER — EPINEPHRINE 1 MG/ML
0.3 INJECTION, SOLUTION, CONCENTRATE INTRAVENOUS PRN
Status: CANCELLED | OUTPATIENT
Start: 2020-12-30

## 2020-12-23 RX ORDER — SODIUM CHLORIDE 0.9 % (FLUSH) 0.9 %
10 SYRINGE (ML) INJECTION PRN
Status: DISCONTINUED | OUTPATIENT
Start: 2020-12-23 | End: 2020-12-24 | Stop reason: HOSPADM

## 2020-12-23 RX ORDER — SODIUM CHLORIDE 0.9 % (FLUSH) 0.9 %
5 SYRINGE (ML) INJECTION PRN
Status: CANCELLED | OUTPATIENT
Start: 2020-12-30

## 2020-12-23 RX ORDER — SODIUM CHLORIDE 0.9 % (FLUSH) 0.9 %
10 SYRINGE (ML) INJECTION PRN
Status: CANCELLED | OUTPATIENT
Start: 2020-12-30

## 2020-12-23 RX ORDER — SODIUM CHLORIDE 9 MG/ML
INJECTION, SOLUTION INTRAVENOUS CONTINUOUS
Status: CANCELLED | OUTPATIENT
Start: 2020-12-30

## 2020-12-23 RX ORDER — HEPARIN SODIUM (PORCINE) LOCK FLUSH IV SOLN 100 UNIT/ML 100 UNIT/ML
500 SOLUTION INTRAVENOUS PRN
Status: CANCELLED | OUTPATIENT
Start: 2020-12-30

## 2020-12-23 RX ADMIN — FERRIC CARBOXYMALTOSE INJECTION 750 MG: 50 INJECTION, SOLUTION INTRAVENOUS at 10:31

## 2020-12-23 RX ADMIN — Medication 10 ML: at 10:30

## 2020-12-23 RX ADMIN — Medication 10 ML: at 11:20

## 2020-12-23 RX ADMIN — Medication 10 ML: at 10:10

## 2020-12-23 NOTE — PROGRESS NOTES
Outpatient [address] VISIT    NAME:  Vladimir Fischer OF BIRTH:  12/15/1929  MEDICAL RECORD NUMBER:  [id number]  EPISODE DATE:  12/23/2020    Patient [facility] to [facility]   [] per wheelchair   [x] ambulatory     Alert and oriented X 4. States lives in independent apartments at [facility]. States he usually drives self but has not driven since March since the facility has them on lockdown. C/O increased fatigue due to anemia. Instructed re effects of injectafer on anemia. Denies new s/s of infection or increase in respiratory distress. Afebrile. Wearing face mask to prevent the spread of COVID-19    Has the patient had a previous problem with Injectafer or Iron Infusions? No never received  Any current infection or illness? No   Patient on antibiotics? No   History of Hypertension? Yes on medications    Is the patient experiencing any:  Fatigue:   []   None  [x]   Increase over baseline but not altering normal activities  []   Moderate of causing difficulty performing some activities  []   Severe or loss of ability to perform some activities  []   Bedridden or disabling     Dizziness or Lightheadedness:   [x]   None  []   No Interference  []   Interferes with functioning but not activities of daily living  []   Interferes with daily activies  []   Bedridden or disabling     Shortness of Breath:   []   None   [x]   Dyspneic on exertion   []   Dyspnea with normal activities  []   Dyspnea at rest    Edema: Trace Location of edema: left leg and right leg.  States no change from normal     Tachycardia: No    Heart Palpitations: No      Chest Pain: No      /65   Pulse 60   Temp 97.3 °F (36.3 °C) (Infrared)   Resp 16   SpO2 98%   Patient Vitals for the past 2 hrs:   BP Temp Temp src Pulse Resp SpO2   12/23/20 1050 126/65   60 16    12/23/20 1000 (!) 154/72 97.3 °F (36.3 °C) Infrared 80 17 98 %       Current Lab Data: Hemoglobin/Hematocrit:    Lab Results   Component Value Date    HGB 8.7 11/11/2020    HCT 28.1 11/11/2020     IRON:    Lab Results   Component Value Date    IRON 22 12/02/2020     Iron Saturation:    Lab Results   Component Value Date    LABIRON 6 12/02/2020     TIBC:    Lab Results   Component Value Date    TIBC 344 12/02/2020     FERRITIN:    Lab Results   Component Value Date    FERRITIN 20.6 12/02/2020       Dose Administered: 750 mg  Todays dose is number 1 out of 2 ordered for this patient. Response to treatment:  Well tolerated by patient. BP checked 15 mins into infusion and denies any side effects, rate increased and tolerated without difficulty. Education:    Verbalized understanding    Scheduled to return for next dose of Injectafer on December 30, 2020.      Electronically signed by Sheeba Rao RN on 12/23/2020 at 11:01 AM

## 2020-12-30 ENCOUNTER — HOSPITAL ENCOUNTER (OUTPATIENT)
Dept: INFUSION THERAPY | Age: 85
Setting detail: INFUSION SERIES
Discharge: HOME | End: 2020-12-30
Payer: MEDICARE

## 2020-12-30 VITALS
OXYGEN SATURATION: 98 % | DIASTOLIC BLOOD PRESSURE: 63 MMHG | SYSTOLIC BLOOD PRESSURE: 153 MMHG | TEMPERATURE: 97.3 F | HEART RATE: 62 BPM | RESPIRATION RATE: 16 BRPM

## 2020-12-30 DIAGNOSIS — D50.9 IRON DEFICIENCY ANEMIA, UNSPECIFIED IRON DEFICIENCY ANEMIA TYPE: ICD-10-CM

## 2020-12-30 DIAGNOSIS — E87.5 HYPERKALEMIA: Primary | ICD-10-CM

## 2020-12-30 PROCEDURE — 96365 THER/PROPH/DIAG IV INF INIT: CPT

## 2020-12-30 PROCEDURE — 2580000003 HC RX 258: Performed by: INTERNAL MEDICINE

## 2020-12-30 PROCEDURE — 6360000002 HC RX W HCPCS: Performed by: INTERNAL MEDICINE

## 2020-12-30 RX ORDER — SODIUM CHLORIDE 0.9 % (FLUSH) 0.9 %
10 SYRINGE (ML) INJECTION PRN
Status: DISCONTINUED | OUTPATIENT
Start: 2020-12-30 | End: 2020-12-31 | Stop reason: HOSPADM

## 2020-12-30 RX ORDER — HEPARIN SODIUM (PORCINE) LOCK FLUSH IV SOLN 100 UNIT/ML 100 UNIT/ML
500 SOLUTION INTRAVENOUS PRN
Status: CANCELLED | OUTPATIENT
Start: 2021-01-06

## 2020-12-30 RX ORDER — METHYLPREDNISOLONE SODIUM SUCCINATE 125 MG/2ML
125 INJECTION, POWDER, LYOPHILIZED, FOR SOLUTION INTRAMUSCULAR; INTRAVENOUS ONCE
Status: CANCELLED | OUTPATIENT
Start: 2021-01-06 | End: 2021-01-06

## 2020-12-30 RX ORDER — SODIUM CHLORIDE 9 MG/ML
INJECTION, SOLUTION INTRAVENOUS CONTINUOUS
Status: CANCELLED | OUTPATIENT
Start: 2021-01-06

## 2020-12-30 RX ORDER — SODIUM CHLORIDE 0.9 % (FLUSH) 0.9 %
10 SYRINGE (ML) INJECTION PRN
Status: CANCELLED | OUTPATIENT
Start: 2021-01-06

## 2020-12-30 RX ORDER — DIPHENHYDRAMINE HYDROCHLORIDE 50 MG/ML
50 INJECTION INTRAMUSCULAR; INTRAVENOUS ONCE
Status: CANCELLED | OUTPATIENT
Start: 2021-01-06 | End: 2021-01-06

## 2020-12-30 RX ORDER — EPINEPHRINE 1 MG/ML
0.3 INJECTION, SOLUTION, CONCENTRATE INTRAVENOUS PRN
Status: CANCELLED | OUTPATIENT
Start: 2021-01-06

## 2020-12-30 RX ORDER — SODIUM CHLORIDE 0.9 % (FLUSH) 0.9 %
5 SYRINGE (ML) INJECTION PRN
Status: CANCELLED | OUTPATIENT
Start: 2021-01-06

## 2020-12-30 RX ADMIN — Medication 10 ML: at 14:20

## 2020-12-30 RX ADMIN — FERRIC CARBOXYMALTOSE INJECTION 750 MG: 50 INJECTION, SOLUTION INTRAVENOUS at 13:35

## 2020-12-30 RX ADMIN — Medication 10 ML: at 13:30

## 2020-12-30 NOTE — PROGRESS NOTES
[address] [facility] VISIT    NAME:  Kenneth Mckeon OF BIRTH:  12/15/1929  MEDICAL RECORD NUMBER:  [id number]  EPISODE DATE:  12/30/2020    Patient arrived to [facility] 58   [] per wheelchair   [x] ambulatory    Alert and oriented X 4. Denies any side effects from last in fusion. Denies any noticeable improvement from last infusion. States he isnt sure what to do from here. States has an order for some labs prior to Feb appointment but only normal labs, nothing related to iron or ferritin. Instructed to call MD next week and let them know 2 doses complete and needs orders to repeat labs. Denies new s/s of infection or increase in respiratory distress. Afebrile. Wearing face mask to prevent the spread of COVID-19     Has the patient had a previous problem with Injectafer or Iron Infusions? No  Any current infection or illness? No   Patient on antibiotics? No   History of Hypertension? Yes on medications daily.      Is the patient experiencing any:  Fatigue:   []   None  [x]   Increase over baseline but not altering normal activities  []   Moderate of causing difficulty performing some activities  []   Severe or loss of ability to perform some activities  []   Bedridden or disabling     Dizziness or Lightheadedness:   [x]   None  []   No Interference  []   Interferes with functioning but not activities of daily living  []   Interferes with daily activies  []   Bedridden or disabling     Shortness of Breath:   []   None   [x]   Dyspneic on exertion   []   Dyspnea with normal activities  []   Dyspnea at rest    Edema: none Location of edema: nothing    Tachycardia: No    Heart Palpitations: No      Chest Pain: No      BP (!) 144/71   Pulse 71   Resp 16   SpO2 98%   Patient Vitals for the past 2 hrs:   BP Temp src Pulse Resp SpO2   12/30/20 1300 (!) 144/71 Oral 71 16 98 %       Current Lab Data:  Hemoglobin/Hematocrit:    Lab Results Component Value Date    HGB 8.7 11/11/2020    HCT 28.1 11/11/2020     IRON:    Lab Results   Component Value Date    IRON 22 12/02/2020     Iron Saturation:    Lab Results   Component Value Date    LABIRON 6 12/02/2020     TIBC:    Lab Results   Component Value Date    TIBC 344 12/02/2020     FERRITIN:    Lab Results   Component Value Date    FERRITIN 20.6 12/02/2020       Dose Administered: 750 mg  Todays dose is number 2 out of 2 ordered for this patient. Response to treatment:  Well tolerated by patient. Education:    Patient and daughter Verbalized understanding    Scheduled to return for next dose of Injectafer on . ., n/a.      Electronically signed by Elsi Villatoro RN on 12/30/2020 at 1:30 PM

## 2021-01-26 ENCOUNTER — TELEPHONE (OUTPATIENT)
Dept: INTERNAL MEDICINE CLINIC | Age: 86
End: 2021-01-26

## 2021-01-26 ENCOUNTER — OFFICE VISIT (OUTPATIENT)
Dept: INTERNAL MEDICINE CLINIC | Age: 86
End: 2021-01-26
Payer: MEDICARE

## 2021-01-26 VITALS
DIASTOLIC BLOOD PRESSURE: 84 MMHG | HEART RATE: 75 BPM | TEMPERATURE: 96.8 F | WEIGHT: 185 LBS | BODY MASS INDEX: 29.86 KG/M2 | SYSTOLIC BLOOD PRESSURE: 132 MMHG | OXYGEN SATURATION: 98 %

## 2021-01-26 DIAGNOSIS — H61.23 IMPACTED CERUMEN OF BOTH EARS: Primary | ICD-10-CM

## 2021-01-26 PROCEDURE — 1123F ACP DISCUSS/DSCN MKR DOCD: CPT | Performed by: NURSE PRACTITIONER

## 2021-01-26 PROCEDURE — G8427 DOCREV CUR MEDS BY ELIG CLIN: HCPCS | Performed by: NURSE PRACTITIONER

## 2021-01-26 PROCEDURE — 99213 OFFICE O/P EST LOW 20 MIN: CPT | Performed by: NURSE PRACTITIONER

## 2021-01-26 PROCEDURE — G8417 CALC BMI ABV UP PARAM F/U: HCPCS | Performed by: NURSE PRACTITIONER

## 2021-01-26 PROCEDURE — G8482 FLU IMMUNIZE ORDER/ADMIN: HCPCS | Performed by: NURSE PRACTITIONER

## 2021-01-26 PROCEDURE — 4040F PNEUMOC VAC/ADMIN/RCVD: CPT | Performed by: NURSE PRACTITIONER

## 2021-01-26 PROCEDURE — 1036F TOBACCO NON-USER: CPT | Performed by: NURSE PRACTITIONER

## 2021-01-26 ASSESSMENT — ENCOUNTER SYMPTOMS
SHORTNESS OF BREATH: 0
TROUBLE SWALLOWING: 0
SINUS PRESSURE: 0
SINUS PAIN: 0

## 2021-01-26 NOTE — TELEPHONE ENCOUNTER
Patient daughter calling asking if Johanna can remove wax buildup in patient ears. If this procedure can be done in the office daughter wants to schedule appointment for patient.       Please Advise

## 2021-01-26 NOTE — PATIENT INSTRUCTIONS
Use debrox ear wax removal daily x 1 week and then return for an ear wash. Stop atorvastatin for 2 week to see if your legs feel better. Patient Education        Earwax Blockage: Care Instructions  Your Care Instructions     Earwax is a natural substance that protects the ear canal. Normally, earwax drains from the ears and does not cause problems. Sometimes earwax builds up and hardens. Earwax blockage (also called cerumen impaction) can cause some loss of hearing and pain. When wax is tightly packed, you will need to have your doctor remove it. Follow-up care is a key part of your treatment and safety. Be sure to make and go to all appointments, and call your doctor if you are having problems. It's also a good idea to know your test results and keep a list of the medicines you take. How can you care for yourself at home? · Do not try to remove earwax with cotton swabs, fingers, or other objects. This can make the blockage worse and damage the eardrum. · If your doctor recommends that you try to remove earwax at home:  ? Soften and loosen the earwax with warm mineral oil. You also can try hydrogen peroxide mixed with an equal amount of room temperature water. Place 2 drops of the fluid, warmed to body temperature, in the ear two times a day for up to 5 days. ? Once the wax is loose and soft, all that is usually needed to remove it from the ear canal is a gentle, warm shower. Direct the water into the ear, then tip your head to let the earwax drain out. Dry your ear thoroughly with a hair dryer set on low. Hold the dryer several inches from your ear. ? If the warm mineral oil and shower do not work, use an over-the-counter wax softener. Read and follow all instructions on the label. After using the wax softener, use an ear syringe to gently flush the ear. Make sure the flushing solution is body temperature. Cool or hot fluids in the ear can cause dizziness. When should you call for help? Call your doctor now or seek immediate medical care if:    · Pus or blood drains from your ear.     · Your ears are ringing or feel full.     · You have a loss of hearing. Watch closely for changes in your health, and be sure to contact your doctor if:    · You have pain or reduced hearing after 1 week of home treatment.     · You have any new symptoms, such as nausea or balance problems. Where can you learn more? Go to [email or web address]eb[employer]. org and sign in to your [employer] account. Enter T216 in the [employer] box to learn more about \"Earwax Blockage: Care Instructions. \"     If you do not have an account, please click on the \"Sign Up Now\" link. Current as of: June 26, 2019               Content Version: 12.6  © 4641-3089 [employer]. Care instructions adapted under license by [facility] ([facility]). If you have questions about a medical condition or this instruction, always ask your healthcare professional. [address] any warranty or liability for your use of this information.

## 2021-01-26 NOTE — PROGRESS NOTES
2021     Edgardo Chan (:  12/15/1929) is a 80 y.o. male, here for evaluation of the following medical concerns:    Chief Complaint   Patient presents with    Cerumen Impaction     both ears [place]       HPI  Patient states decrease hearing from ears for several days  . Feels that he has a lot of wax in his ears. Denies any pain or drainage. NO recent illness. Patient states that his legs are sore at night , feels that it is one of his meds. He does admit to decrease activity since covid with not walking in stores and driving. He denies any need for PT at this time. Review of Systems   Constitutional: Negative for chills and fever. HENT: Positive for hearing loss (ear wax ). Negative for congestion, ear discharge, ear pain, sinus pressure, sinus pain and trouble swallowing. Respiratory: Negative for shortness of breath. Cardiovascular: Negative for chest pain, palpitations and leg swelling. Musculoskeletal: Positive for myalgias (to legs at night ). Negative for joint swelling. Prior to Visit Medications    Medication Sig Taking?  Authorizing Provider   metoprolol succinate (TOPROL XL) 25 MG extended release tablet TAKE ONE TABLET BY MOUTH DAILY Yes MADHU Grant CNP   atorvastatin (LIPITOR) 10 MG tablet TAKE ONE TABLET BY MOUTH DAILY Yes MADHU Grant CNP   apixaban (ELIQUIS) 2.5 MG TABS tablet TAKE ONE TABLET BY MOUTH TWICE A DAY Yes MADHU Grant CNP   amLODIPine (NORVASC) 2.5 MG tablet TAKE ONE TABLET BY MOUTH DAILY Yes MADHU Grant CNP   doxazosin (CARDURA) 4 MG tablet Take 1 tablet by mouth nightly Yes MADHU Grant CNP   predniSONE (DELTASONE) 20 MG tablet Take 3 tablets by mouth daily  Patient taking differently: Take 60 mg by mouth daily as needed  Yes Marlyn Martinez MD   pantoprazole (PROTONIX) 40 MG tablet Take 40 mg by mouth daily Yes Historical Provider, MD        Social History     Tobacco Use  Smoking status: Former Smoker     Quit date: 1950     Years since quittin.8    Smokeless tobacco: Never Used    Tobacco comment: smoked as teenager   Substance Use Topics    Alcohol use: No        Vitals:    21 1339   BP: 132/84   Site: Right Upper Arm   Position: Sitting   Cuff Size: Medium Adult   Pulse: 75   Temp: 96.8 °F (36 °C)   SpO2: 98%   Weight: 185 lb (83.9 kg)     Estimated body mass index is 29.86 kg/m² as calculated from the following:    Height as of 20: 5' 6\" (1.676 m). Weight as of this encounter: 185 lb (83.9 kg). Physical Exam  Vitals signs reviewed. Constitutional:       General: He is not in acute distress. Appearance: He is not ill-appearing, toxic-appearing or diaphoretic. HENT:      Head: Normocephalic and atraumatic. Right Ear: There is impacted cerumen. Left Ear: There is impacted cerumen. Cardiovascular:      Rate and Rhythm: Normal rate and regular rhythm. Pulses: Normal pulses. Heart sounds: Normal heart sounds. Pulmonary:      Effort: Pulmonary effort is normal.      Breath sounds: Normal breath sounds. Skin:     General: Skin is warm and dry. Neurological:      Mental Status: He is alert. Psychiatric:         Mood and Affect: Mood normal.         Behavior: Behavior normal.         ASSESSMENT/PLAN:  1. Impacted cerumen of both ears, acute   Demetri ears irrigated by [employer] with mild amt of wax removal   Instructed to use debrox drops x 1 week an return for another irrigations. Stop atorvastatin for 2 week to see if your legs feel better. Return in about 1 week (around 2021) for ear wash . All questions addresses and answered . Patient agrees with plan of care. An electronic signature was used to authenticate this note.     --MADHU Siu - CNP on 2021 at 2:46 PM

## 2021-02-03 ENCOUNTER — OFFICE VISIT (OUTPATIENT)
Dept: INTERNAL MEDICINE CLINIC | Age: 86
End: 2021-02-03
Payer: MEDICARE

## 2021-02-03 VITALS
RESPIRATION RATE: 18 BRPM | HEART RATE: 75 BPM | DIASTOLIC BLOOD PRESSURE: 70 MMHG | TEMPERATURE: 97.2 F | OXYGEN SATURATION: 97 % | SYSTOLIC BLOOD PRESSURE: 126 MMHG

## 2021-02-03 DIAGNOSIS — H61.23 BILATERAL IMPACTED CERUMEN: Primary | ICD-10-CM

## 2021-02-03 PROCEDURE — 1036F TOBACCO NON-USER: CPT | Performed by: NURSE PRACTITIONER

## 2021-02-03 PROCEDURE — 99213 OFFICE O/P EST LOW 20 MIN: CPT | Performed by: NURSE PRACTITIONER

## 2021-02-03 PROCEDURE — G8417 CALC BMI ABV UP PARAM F/U: HCPCS | Performed by: NURSE PRACTITIONER

## 2021-02-03 PROCEDURE — G8482 FLU IMMUNIZE ORDER/ADMIN: HCPCS | Performed by: NURSE PRACTITIONER

## 2021-02-03 PROCEDURE — G8427 DOCREV CUR MEDS BY ELIG CLIN: HCPCS | Performed by: NURSE PRACTITIONER

## 2021-02-03 PROCEDURE — 4040F PNEUMOC VAC/ADMIN/RCVD: CPT | Performed by: NURSE PRACTITIONER

## 2021-02-03 PROCEDURE — 1123F ACP DISCUSS/DSCN MKR DOCD: CPT | Performed by: NURSE PRACTITIONER

## 2021-02-03 ASSESSMENT — ENCOUNTER SYMPTOMS
SHORTNESS OF BREATH: 0
SINUS PRESSURE: 0
SORE THROAT: 0
SINUS PAIN: 0
RHINORRHEA: 0

## 2021-02-03 NOTE — PROGRESS NOTES
2/3/2021     Edgardo Chan (:  12/15/1929) is a 80 y.o. male, here for evaluation of the following medical concerns:    Chief Complaint   Patient presents with    Cerumen Impaction       HPI  Here today for a follow up for impacted cerumen. He was seen last week and attempted to flush the wax bilaterally. Only a small amt could be removed. He has been using debrox drops for a week and has not noted any wax removal but is hearing better. Denies any pain, pressure or fevers. Due for his second Covid vaccine feb 15. Review of Systems   Constitutional: Negative for appetite change, chills, fatigue and fever. HENT: Negative for ear discharge, ear pain, hearing loss, rhinorrhea, sinus pressure, sinus pain, sore throat and tinnitus. Respiratory: Negative for shortness of breath. Cardiovascular: Negative for chest pain and palpitations. Neurological: Negative for dizziness. Prior to Visit Medications    Medication Sig Taking?  Authorizing Provider   metoprolol succinate (TOPROL XL) 25 MG extended release tablet TAKE ONE TABLET BY MOUTH DAILY Yes MADHU Grant CNP   atorvastatin (LIPITOR) 10 MG tablet TAKE ONE TABLET BY MOUTH DAILY Yes MADHU Grant CNP   apixaban (ELIQUIS) 2.5 MG TABS tablet TAKE ONE TABLET BY MOUTH TWICE A DAY Yes MADHU Grant CNP   amLODIPine (NORVASC) 2.5 MG tablet TAKE ONE TABLET BY MOUTH DAILY Yes MADHU Grant CNP   doxazosin (CARDURA) 4 MG tablet Take 1 tablet by mouth nightly Yes MADHU Grant CNP   predniSONE (DELTASONE) 20 MG tablet Take 3 tablets by mouth daily  Patient taking differently: Take 60 mg by mouth daily as needed  Yes Marlyn Martinez MD   pantoprazole (PROTONIX) 40 MG tablet Take 40 mg by mouth daily Yes Historical Provider, MD        Social History     Tobacco Use    Smoking status: Former Smoker     Quit date: 1950     Years since quittin.8    Smokeless tobacco: Never Used  Tobacco comment: smoked as teenager   Substance Use Topics    Alcohol use: No        Vitals:    02/03/21 1004   BP: 126/70   Site: Right Upper Arm   Position: Sitting   Cuff Size: Medium Adult   Pulse: 75   Resp: 18   Temp: 97.2 °F (36.2 °C)   SpO2: 97%     Estimated body mass index is 29.86 kg/m² as calculated from the following:    Height as of 2/21/20: 5' 6\" (1.676 m). Weight as of 1/26/21: 185 lb (83.9 kg). Physical Exam  Vitals signs reviewed. Constitutional:       General: He is not in acute distress. Appearance: Normal appearance. He is not ill-appearing, toxic-appearing or diaphoretic. HENT:      Head: Normocephalic and atraumatic. Right Ear: Ear canal and external ear normal. There is impacted cerumen. Left Ear: Ear canal and external ear normal. There is impacted cerumen. Ears:      Comments: Post irrigation of bilateral ears. Small amount of soft wax remains to the 12 o'clock position but the TM's are intact and patent. Canals are with any redness. Cardiovascular:      Rate and Rhythm: Normal rate and regular rhythm. Pulses: Normal pulses. Heart sounds: Normal heart sounds. Pulmonary:      Effort: Pulmonary effort is normal.      Breath sounds: Normal breath sounds. Skin:     Capillary Refill: Capillary refill takes less than 2 seconds. Neurological:      Mental Status: He is alert and oriented to person, place, and time. Psychiatric:         Mood and Affect: Mood normal.         Behavior: Behavior normal.         ASSESSMENT/PLAN:  1. Bilateral impacted cerumen, resolved   Irrigations of bilateral ears removed cerumen. Instructed to cont to use debrox gtts as a weekly routine to avoid impaction. Return if symptoms worsen or fail to improve. All questions addresses and answered . Patient agrees with plan of care. An electronic signature was used to authenticate this note.     --MADHU Courtney - CNP on 2/3/2021 at 10:33 AM

## 2021-03-15 ENCOUNTER — NURSE ONLY (OUTPATIENT)
Dept: CARDIOLOGY CLINIC | Age: 86
End: 2021-03-15
Payer: MEDICARE

## 2021-03-15 DIAGNOSIS — R00.1 BRADYCARDIA, SINUS: ICD-10-CM

## 2021-03-15 DIAGNOSIS — Z95.0 CARDIAC PACEMAKER RECIPIENT: ICD-10-CM

## 2021-03-15 PROCEDURE — 93294 REM INTERROG EVL PM/LDLS PM: CPT | Performed by: INTERNAL MEDICINE

## 2021-03-15 PROCEDURE — 93296 REM INTERROG EVL PM/IDS: CPT | Performed by: INTERNAL MEDICINE

## 2021-03-15 NOTE — PROGRESS NOTES
We received remote transmission from patient's monitor at home. Transmission shows normal sensing and pacing function. Pt is on Eliquis for known AF. EP physician will review. See interrogation under cardiology tab in the [address] [place] [address] [address] field for more details.

## 2021-03-15 NOTE — LETTER
[address] [facility] [phone]  [address] [phone]    Pacemaker/Defibrillator Clinic          03/15/21        [address] [facility] [address]        Dear Kaz Sofia    This letter is to inform you that we received the transmission from your monitor at home that checks your implanted heart device. The next date your monitor will automatically transmit will be 6-15-21. If your report needs attention we will notify you. Your device and monitor are wireless and most transmit cellularly, but please periodically check your monitor is still plugged in to the electrical outlet. If you still use the telephone land line to send please ensure the connection to the phone salvador is secure. This will help to ensure successful automatic transmissions in the future. Also, the monitor needs to be close to you while sleeping at night. Please be aware that the remote device transmission sites are periodically monitored only during regular business hours during which simultaneous in-office device clinics are being run. If your transmission requires attention, we will contact you as soon as possible. Thank you.             [facility]

## 2021-03-19 ENCOUNTER — OFFICE VISIT (OUTPATIENT)
Dept: INTERNAL MEDICINE CLINIC | Age: 86
End: 2021-03-19
Payer: MEDICARE

## 2021-03-19 VITALS
OXYGEN SATURATION: 98 % | WEIGHT: 182 LBS | HEART RATE: 76 BPM | DIASTOLIC BLOOD PRESSURE: 80 MMHG | SYSTOLIC BLOOD PRESSURE: 130 MMHG | TEMPERATURE: 97 F | BODY MASS INDEX: 29.38 KG/M2 | RESPIRATION RATE: 18 BRPM

## 2021-03-19 DIAGNOSIS — E78.2 MIXED HYPERLIPIDEMIA: Primary | ICD-10-CM

## 2021-03-19 PROCEDURE — 1123F ACP DISCUSS/DSCN MKR DOCD: CPT | Performed by: NURSE PRACTITIONER

## 2021-03-19 PROCEDURE — 4040F PNEUMOC VAC/ADMIN/RCVD: CPT | Performed by: NURSE PRACTITIONER

## 2021-03-19 PROCEDURE — 99214 OFFICE O/P EST MOD 30 MIN: CPT | Performed by: NURSE PRACTITIONER

## 2021-03-19 PROCEDURE — G8427 DOCREV CUR MEDS BY ELIG CLIN: HCPCS | Performed by: NURSE PRACTITIONER

## 2021-03-19 PROCEDURE — G8482 FLU IMMUNIZE ORDER/ADMIN: HCPCS | Performed by: NURSE PRACTITIONER

## 2021-03-19 PROCEDURE — 1036F TOBACCO NON-USER: CPT | Performed by: NURSE PRACTITIONER

## 2021-03-19 PROCEDURE — G8417 CALC BMI ABV UP PARAM F/U: HCPCS | Performed by: NURSE PRACTITIONER

## 2021-03-19 ASSESSMENT — ENCOUNTER SYMPTOMS
BLURRED VISION: 0
TROUBLE SWALLOWING: 0
VOMITING: 0
NAUSEA: 0
ABDOMINAL PAIN: 0
SHORTNESS OF BREATH: 0

## 2021-03-19 ASSESSMENT — PATIENT HEALTH QUESTIONNAIRE - PHQ9
SUM OF ALL RESPONSES TO PHQ QUESTIONS 1-9: 0
SUM OF ALL RESPONSES TO PHQ9 QUESTIONS 1 & 2: 0
2. FEELING DOWN, DEPRESSED OR HOPELESS: 0
SUM OF ALL RESPONSES TO PHQ QUESTIONS 1-9: 0
SUM OF ALL RESPONSES TO PHQ QUESTIONS 1-9: 0

## 2021-03-19 NOTE — PROGRESS NOTES
3/19/2021     Wayne Kaur (:  12/15/1929) is a 80 y.o. male, here for evaluation of the following medical concerns:    Chief Complaint   Patient presents with    Hypertension     check     Hyperlipidemia     check      Afib : Takes eliquis and metoprolol . Does see Dr. Alexander Malhotra. Denies palpation , sob, chest pain , syncope . No recent falls . No beeding issues . Pacemaker in place last check 3/15/21 : normal findings . Hypertension  This is a chronic problem. The current episode started more than 1 year ago. The problem has been resolved since onset. The problem is controlled. Pertinent negatives include no anxiety, blurred vision, chest pain, headaches, malaise/fatigue, palpitations, peripheral edema or shortness of breath. There are no associated agents to hypertension. Risk factors for coronary artery disease include dyslipidemia, obesity and male gender. Past treatments include calcium channel blockers and beta blockers. The current treatment provides significant improvement. There are no compliance problems. Hypertensive end-organ damage includes kidney disease. Identifiable causes of hypertension include chronic renal disease. Hyperlipidemia  This is a chronic problem. The current episode started more than 1 year ago. The problem is controlled. Recent lipid tests were reviewed and are normal. Exacerbating diseases include chronic renal disease and obesity. There are no known factors aggravating his hyperlipidemia. Pertinent negatives include no chest pain, focal sensory loss, focal weakness, leg pain, myalgias or shortness of breath. Current antihyperlipidemic treatment includes statins. The current treatment provides significant improvement of lipids. There are no compliance problems. Risk factors for coronary artery disease include dyslipidemia, hypertension, male sex and obesity. CKD: Has seen DR Yaritza Manzanares . He states that CKD is stable . He is due to see him again in april.  He has standing orders to be drawn in 2 weeks to check kidney and iron levels. Anemia : patient denies any dizziness, pallor, SOB . Has had low counts for awhile . Does not take iron pills . Was referred to Heme - but Dr Eduardo Ng is sending him to iron infusion therapy. Did receive iron infusion in . Review of Systems   Constitutional: Negative for chills, fatigue, fever and malaise/fatigue. HENT: Negative for trouble swallowing. Eyes: Negative for blurred vision and visual disturbance. Respiratory: Negative for shortness of breath. Cardiovascular: Negative for chest pain, palpitations and leg swelling. Gastrointestinal: Negative for abdominal pain, nausea and vomiting. Genitourinary: Negative for dysuria. Musculoskeletal: Negative for myalgias. Neurological: Negative for dizziness, focal weakness and headaches. Psychiatric/Behavioral: Negative for dysphoric mood. Prior to Visit Medications    Medication Sig Taking?  Authorizing Provider   metoprolol succinate (TOPROL XL) 25 MG extended release tablet TAKE ONE TABLET BY MOUTH DAILY Yes MADHU Grant CNP   atorvastatin (LIPITOR) 10 MG tablet TAKE ONE TABLET BY MOUTH DAILY Yes MADHU Grant CNP   apixaban (ELIQUIS) 2.5 MG TABS tablet TAKE ONE TABLET BY MOUTH TWICE A DAY Yes MADHU Grant CNP   amLODIPine (NORVASC) 2.5 MG tablet TAKE ONE TABLET BY MOUTH DAILY Yes MADHU Grant CNP   doxazosin (CARDURA) 4 MG tablet Take 1 tablet by mouth nightly Yes MADHU Grant CNP   predniSONE (DELTASONE) 20 MG tablet Take 3 tablets by mouth daily  Patient taking differently: Take 60 mg by mouth daily as needed  Yes Meghann Johnson MD   pantoprazole (PROTONIX) 40 MG tablet Take 40 mg by mouth daily Yes Historical Provider, MD        Social History     Tobacco Use    Smoking status: Former Smoker     Quit date: 1950     Years since quittin.9    Smokeless tobacco: Never Used    Tobacco comment: smoked as teenager   Substance Use Topics    Alcohol use: No        Vitals:    03/19/21 1105   BP: 130/80   Site: Left Upper Arm   Position: Sitting   Cuff Size: Medium Adult   Pulse: 76   Resp: 18   Temp: 97 °F (36.1 °C)   SpO2: 98%   Weight: 182 lb (82.6 kg)     Estimated body mass index is 29.38 kg/m² as calculated from the following:    Height as of 2/21/20: 5' 6\" (1.676 m). Weight as of this encounter: 182 lb (82.6 kg). Physical Exam  Vitals signs reviewed. Constitutional:       General: He is not in acute distress. Appearance: Normal appearance. He is not ill-appearing, toxic-appearing or diaphoretic. HENT:      Head: Normocephalic and atraumatic. Neck:      Musculoskeletal: Normal range of motion and neck supple. Vascular: No carotid bruit. Cardiovascular:      Rate and Rhythm: Normal rate and regular rhythm. Pulses: Normal pulses. Heart sounds: Normal heart sounds. Pulmonary:      Effort: Pulmonary effort is normal. No respiratory distress. Breath sounds: Normal breath sounds. Abdominal:      General: Bowel sounds are normal. There is no distension. Palpations: Abdomen is soft. There is no mass. Tenderness: There is no abdominal tenderness. Musculoskeletal: Normal range of motion. Right lower leg: No edema. Left lower leg: No edema. Comments: Use a walker    Lymphadenopathy:      Cervical: No cervical adenopathy. Skin:     General: Skin is warm and dry. Capillary Refill: Capillary refill takes less than 2 seconds. Neurological:      General: No focal deficit present. Mental Status: He is alert and oriented to person, place, and time. Psychiatric:         Mood and Affect: Mood normal.         Behavior: Behavior normal.         Thought Content: Thought content normal.         Judgment: Judgment normal.         ASSESSMENT/PLAN:  Chrissy Perez was seen today for 3 month follow-up.     Diagnoses and all orders for this visit:    Essential hypertension, stable  Currently taking  amlodipine  Reports compliance   No dosage changes , will monitor   -    Iron deficiency anemia, unspecified iron deficiency anemia type  Iron infusion completed in [place]   Dr Wong Crystal labs and infusions . Mixed hyperlipidemia, stable   Currently taking  lipitor   Reports compliance   No dosage changes , will monitor   -   Will assess lipid panel in 2 weeks    Stage 3 chronic kidney disease ([facility] [address] stable   Followed by dr Hooevr Flatten fibrillation with slow ventricular response ([facility] [address] stable   Cardiac pacemaker recipient  Afib:   SVT  Followed an managed by Dr Vinita Landeros . Has pacemaker checked regularly . asymptomatic   Takes BB and eliquis     -    Lives at [facility] here with his daughter today. Return in about 6 months (around 9/19/2021) for htn check . All questions addresses and answered . Patient agrees with plan of care. An electronic signature was used to authenticate this note.     --Radiojar, APRN - CNP on 3/19/2021 at 12:05 PM

## 2021-04-05 DIAGNOSIS — E78.2 MIXED HYPERLIPIDEMIA: ICD-10-CM

## 2021-04-05 LAB
CHOLESTEROL, TOTAL: 83 MG/DL (ref 0–199)
HDLC SERPL-MCNC: 38 MG/DL (ref 40–60)
LDL CHOLESTEROL CALCULATED: 31 MG/DL
TRIGL SERPL-MCNC: 72 MG/DL (ref 0–150)
VLDLC SERPL CALC-MCNC: 14 MG/DL

## 2021-05-20 ENCOUNTER — HOSPITAL ENCOUNTER (EMERGENCY)
Age: 86
Discharge: ANOTHER HOSPITAL | DRG: 534 | End: 2021-05-20
Attending: EMERGENCY MEDICINE | Admitting: HOSPITALIST
Payer: MEDICARE

## 2021-05-20 ENCOUNTER — APPOINTMENT (OUTPATIENT)
Dept: GENERAL RADIOLOGY | Age: 86
DRG: 534 | End: 2021-05-20
Payer: MEDICARE

## 2021-05-20 VITALS
TEMPERATURE: 97.6 F | HEIGHT: 60 IN | OXYGEN SATURATION: 95 % | HEART RATE: 77 BPM | BODY MASS INDEX: 37.22 KG/M2 | WEIGHT: 189.6 LBS | RESPIRATION RATE: 21 BRPM | SYSTOLIC BLOOD PRESSURE: 134 MMHG | DIASTOLIC BLOOD PRESSURE: 62 MMHG

## 2021-05-20 DIAGNOSIS — S72.391A OTHER CLOSED FRACTURE OF SHAFT OF RIGHT FEMUR, INITIAL ENCOUNTER (HCC): Primary | ICD-10-CM

## 2021-05-20 DIAGNOSIS — N18.9 CHRONIC RENAL IMPAIRMENT, UNSPECIFIED CKD STAGE: ICD-10-CM

## 2021-05-20 PROBLEM — S72.001A HIP FRACTURE REQUIRING OPERATIVE REPAIR, RIGHT, CLOSED, INITIAL ENCOUNTER (HCC): Status: ACTIVE | Noted: 2021-05-20

## 2021-05-20 LAB
ANION GAP SERPL CALCULATED.3IONS-SCNC: 12 MMOL/L (ref 3–16)
BASOPHILS ABSOLUTE: 0 K/UL (ref 0–0.2)
BASOPHILS RELATIVE PERCENT: 0.3 %
BUN BLDV-MCNC: 32 MG/DL (ref 7–20)
CALCIUM SERPL-MCNC: 8.8 MG/DL (ref 8.3–10.6)
CHLORIDE BLD-SCNC: 107 MMOL/L (ref 99–110)
CO2: 24 MMOL/L (ref 21–32)
CREAT SERPL-MCNC: 1.6 MG/DL (ref 0.8–1.3)
EKG ATRIAL RATE: 34 BPM
EKG DIAGNOSIS: NORMAL
EKG Q-T INTERVAL: 506 MS
EKG QRS DURATION: 176 MS
EKG QTC CALCULATION (BAZETT): 522 MS
EKG R AXIS: -35 DEGREES
EKG T AXIS: 138 DEGREES
EKG VENTRICULAR RATE: 64 BPM
EOSINOPHILS ABSOLUTE: 0 K/UL (ref 0–0.6)
EOSINOPHILS RELATIVE PERCENT: 0.1 %
GFR AFRICAN AMERICAN: 49
GFR NON-AFRICAN AMERICAN: 41
GLUCOSE BLD-MCNC: 109 MG/DL (ref 70–99)
HCT VFR BLD CALC: 28 % (ref 40.5–52.5)
HEMOGLOBIN: 9.6 G/DL (ref 13.5–17.5)
INR BLD: 1.64 (ref 0.86–1.14)
LYMPHOCYTES ABSOLUTE: 0.5 K/UL (ref 1–5.1)
LYMPHOCYTES RELATIVE PERCENT: 13.1 %
MCH RBC QN AUTO: 31.4 PG (ref 26–34)
MCHC RBC AUTO-ENTMCNC: 34.3 G/DL (ref 31–36)
MCV RBC AUTO: 91.5 FL (ref 80–100)
MONOCYTES ABSOLUTE: 0.3 K/UL (ref 0–1.3)
MONOCYTES RELATIVE PERCENT: 7.6 %
NEUTROPHILS ABSOLUTE: 3.1 K/UL (ref 1.7–7.7)
NEUTROPHILS RELATIVE PERCENT: 78.9 %
PDW BLD-RTO: 14.3 % (ref 12.4–15.4)
PLATELET # BLD: 101 K/UL (ref 135–450)
PMV BLD AUTO: 8.1 FL (ref 5–10.5)
POTASSIUM REFLEX MAGNESIUM: 4.3 MMOL/L (ref 3.5–5.1)
PROTHROMBIN TIME: 19.1 SEC (ref 10–13.2)
RBC # BLD: 3.06 M/UL (ref 4.2–5.9)
SODIUM BLD-SCNC: 143 MMOL/L (ref 136–145)
WBC # BLD: 4 K/UL (ref 4–11)

## 2021-05-20 PROCEDURE — 71045 X-RAY EXAM CHEST 1 VIEW: CPT

## 2021-05-20 PROCEDURE — 80048 BASIC METABOLIC PNL TOTAL CA: CPT

## 2021-05-20 PROCEDURE — 96374 THER/PROPH/DIAG INJ IV PUSH: CPT

## 2021-05-20 PROCEDURE — 1200000000 HC SEMI PRIVATE

## 2021-05-20 PROCEDURE — 93010 ELECTROCARDIOGRAM REPORT: CPT | Performed by: INTERNAL MEDICINE

## 2021-05-20 PROCEDURE — 96375 TX/PRO/DX INJ NEW DRUG ADDON: CPT

## 2021-05-20 PROCEDURE — 85025 COMPLETE CBC W/AUTO DIFF WBC: CPT

## 2021-05-20 PROCEDURE — 85610 PROTHROMBIN TIME: CPT

## 2021-05-20 PROCEDURE — 93005 ELECTROCARDIOGRAM TRACING: CPT | Performed by: EMERGENCY MEDICINE

## 2021-05-20 PROCEDURE — 6360000002 HC RX W HCPCS: Performed by: EMERGENCY MEDICINE

## 2021-05-20 PROCEDURE — 73502 X-RAY EXAM HIP UNI 2-3 VIEWS: CPT

## 2021-05-20 PROCEDURE — 99284 EMERGENCY DEPT VISIT MOD MDM: CPT

## 2021-05-20 RX ORDER — DOXAZOSIN MESYLATE 4 MG/1
4 TABLET ORAL NIGHTLY
Status: CANCELLED | OUTPATIENT
Start: 2021-05-20

## 2021-05-20 RX ORDER — SODIUM CHLORIDE 9 MG/ML
25 INJECTION, SOLUTION INTRAVENOUS PRN
Status: CANCELLED | OUTPATIENT
Start: 2021-05-20

## 2021-05-20 RX ORDER — ORPHENADRINE CITRATE 30 MG/ML
30 INJECTION INTRAMUSCULAR; INTRAVENOUS ONCE
Status: DISCONTINUED | OUTPATIENT
Start: 2021-05-20 | End: 2021-05-20 | Stop reason: HOSPADM

## 2021-05-20 RX ORDER — SODIUM CHLORIDE 0.9 % (FLUSH) 0.9 %
5-40 SYRINGE (ML) INJECTION PRN
Status: CANCELLED | OUTPATIENT
Start: 2021-05-20

## 2021-05-20 RX ORDER — AMLODIPINE BESYLATE 2.5 MG/1
1 TABLET ORAL DAILY
Status: CANCELLED | OUTPATIENT
Start: 2021-05-20

## 2021-05-20 RX ORDER — SODIUM CHLORIDE 0.9 % (FLUSH) 0.9 %
5-40 SYRINGE (ML) INJECTION EVERY 12 HOURS SCHEDULED
Status: CANCELLED | OUTPATIENT
Start: 2021-05-20

## 2021-05-20 RX ORDER — PANTOPRAZOLE SODIUM 40 MG/1
40 TABLET, DELAYED RELEASE ORAL DAILY
Status: CANCELLED | OUTPATIENT
Start: 2021-05-20

## 2021-05-20 RX ORDER — PROMETHAZINE HYDROCHLORIDE 25 MG/1
12.5 TABLET ORAL EVERY 6 HOURS PRN
Status: CANCELLED | OUTPATIENT
Start: 2021-05-20

## 2021-05-20 RX ORDER — POLYETHYLENE GLYCOL 3350 17 G/17G
17 POWDER, FOR SOLUTION ORAL DAILY PRN
Status: CANCELLED | OUTPATIENT
Start: 2021-05-20

## 2021-05-20 RX ORDER — ACETAMINOPHEN 325 MG/1
650 TABLET ORAL EVERY 6 HOURS PRN
Status: CANCELLED | OUTPATIENT
Start: 2021-05-20

## 2021-05-20 RX ORDER — MORPHINE SULFATE 2 MG/ML
2 INJECTION, SOLUTION INTRAMUSCULAR; INTRAVENOUS EVERY 4 HOURS PRN
Status: CANCELLED | OUTPATIENT
Start: 2021-05-20

## 2021-05-20 RX ORDER — ACETAMINOPHEN 650 MG/1
650 SUPPOSITORY RECTAL EVERY 6 HOURS PRN
Status: CANCELLED | OUTPATIENT
Start: 2021-05-20

## 2021-05-20 RX ORDER — FENTANYL CITRATE 50 UG/ML
50 INJECTION, SOLUTION INTRAMUSCULAR; INTRAVENOUS ONCE
Status: COMPLETED | OUTPATIENT
Start: 2021-05-20 | End: 2021-05-20

## 2021-05-20 RX ORDER — ONDANSETRON 2 MG/ML
4 INJECTION INTRAMUSCULAR; INTRAVENOUS EVERY 6 HOURS PRN
Status: CANCELLED | OUTPATIENT
Start: 2021-05-20

## 2021-05-20 RX ORDER — ATORVASTATIN CALCIUM 10 MG/1
1 TABLET, FILM COATED ORAL DAILY
Status: CANCELLED | OUTPATIENT
Start: 2021-05-20

## 2021-05-20 RX ORDER — METOPROLOL SUCCINATE 25 MG/1
1 TABLET, EXTENDED RELEASE ORAL DAILY
Status: CANCELLED | OUTPATIENT
Start: 2021-05-20

## 2021-05-20 RX ADMIN — HYDROMORPHONE HYDROCHLORIDE 0.5 MG: 1 INJECTION, SOLUTION INTRAMUSCULAR; INTRAVENOUS; SUBCUTANEOUS at 13:29

## 2021-05-20 RX ADMIN — FENTANYL CITRATE 50 MCG: 50 INJECTION, SOLUTION INTRAMUSCULAR; INTRAVENOUS at 12:05

## 2021-05-20 RX ADMIN — HYDROMORPHONE HYDROCHLORIDE 0.5 MG: 1 INJECTION, SOLUTION INTRAMUSCULAR; INTRAVENOUS; SUBCUTANEOUS at 14:39

## 2021-05-20 RX ADMIN — HYDROMORPHONE HYDROCHLORIDE 0.5 MG: 1 INJECTION, SOLUTION INTRAMUSCULAR; INTRAVENOUS; SUBCUTANEOUS at 16:28

## 2021-05-20 ASSESSMENT — PAIN DESCRIPTION - DESCRIPTORS: DESCRIPTORS: ACHING

## 2021-05-20 ASSESSMENT — PAIN SCALES - GENERAL
PAINLEVEL_OUTOF10: 8
PAINLEVEL_OUTOF10: 10
PAINLEVEL_OUTOF10: 10
PAINLEVEL_OUTOF10: 8
PAINLEVEL_OUTOF10: 6

## 2021-05-20 ASSESSMENT — PAIN DESCRIPTION - FREQUENCY: FREQUENCY: CONTINUOUS

## 2021-05-20 ASSESSMENT — PAIN DESCRIPTION - LOCATION: LOCATION: LEG

## 2021-05-20 ASSESSMENT — PAIN DESCRIPTION - PROGRESSION: CLINICAL_PROGRESSION: GRADUALLY WORSENING

## 2021-05-20 ASSESSMENT — PAIN - FUNCTIONAL ASSESSMENT: PAIN_FUNCTIONAL_ASSESSMENT: PREVENTS OR INTERFERES SOME ACTIVE ACTIVITIES AND ADLS

## 2021-05-20 ASSESSMENT — PAIN DESCRIPTION - ORIENTATION: ORIENTATION: RIGHT

## 2021-05-20 ASSESSMENT — PAIN DESCRIPTION - ONSET: ONSET: SUDDEN

## 2021-05-20 ASSESSMENT — PAIN DESCRIPTION - PAIN TYPE: TYPE: ACUTE PAIN

## 2021-05-20 NOTE — PROGRESS NOTES
Xrays reviewed with Dr Adis Mix by phone at this time. Ortho at [facility] unable to address this complicated right hip fx.  Plan transfer to [facility].

## 2021-05-20 NOTE — ED NOTES
Bed: [facility]  Expected date:   Expected time:   Means of arrival: [facility]  Comments:  89M right hip fracture     Buddy aBtista RN  05/20/21 8005

## 2021-05-20 NOTE — ED NOTES
Patient reports he is incontinent to urine. Male condom cath applied on patient. Patient tolerating well. Will continue to monitor.       Cheryl Verma RN  05/20/21 4291

## 2021-05-20 NOTE — ED PROVIDER NOTES
EMERGENCY DEPARTMENT PROVIDER NOTE    Patient Identification  Pt Name: Mayra Obregon  MRN: [id number]  [place] 12/15/1929  Date of evaluation: 5/20/2021  Provider: Zack Rich DO  PCP: MADHU Lance CNP    Chief Complaint  Hip Pain (right hip and leg injury while standing)      HPI  (History provided by patient)  This is a 80 y.o. male with pertinent past medical history of PAF on Eliquis, myasthenia gravis, CKD, HTN who was brought in by EMS transportation for right hip and leg pain which began after a fall just PTA. Patient states was using his walker when his foot got stuck and he twisted around on the right leg, heard a pop in his right hip and felt immediate pain. Unable to bear weight. Denies any actual fall, no head injury. Pain worsened with movement, nothing makes it better. 10/10 in severity. Denies any weakness or numbness or the lower legs.     ROS    Const:  No fevers, no chills, no generalized weakness  Skin:  No rash, no lesions  Eyes:  No visual changes, no blurry or double vision, no pain  ENT:  No sore throat, no difficulty swallowing, no ear pain, no sinus pain or congestion  Card:  No chest pain, no palpitations, no edema  Resp:  No shortness of breath, no cough, no wheezing  Abd:  No abdominal pain, no nausea, no vomiting, no diarrhea  Genitourinary:  No dysuria, no hematuria  MSK:  +joint pain, +myalgia  Neuro:  No focal weakness, no headache, no paresthesia    All other systems reviewed and negative unless otherwise noted in HPI        I have reviewed the following nursing documentation:  Allergies: Hydralazine, Indomethacin, Mestinon [pyridostigmine bromide], and Piroxicam    Past medical history:   Past Medical History:   Diagnosis Date    Acute respiratory failure with hypoxia (HCC)     Acute upper GI bleed     Arthritis     Closed fracture of orbital wall (HCC) 5/7/2019    Dyspnea 6/18/2013    Elevated hemidiaphragm     Femur fracture ([facility] [address] 8/28/2014    GI bleeding 4/18/2018    History of GI bleed     Hyperglycemia 5/7/2019    Hyperkalemia 4/8/2013    Hyperlipidemia     Hypertension     Hypotension 4/18/2018    Myasthenia gravis     Pleural effusion     Pneumonia of left lower lobe due to infectious organism     Polycystic kidney     Syncope, cardiogenic     UGI bleed 4/23/2018     Past surgical history:   Past Surgical History:   Procedure Laterality Date    BACK SURGERY  2005    CATARACT REMOVAL WITH IMPLANT Right 2/22/13    CATARACT REMOVAL WITH IMPLANT Left 3/22/13    CHOLECYSTECTOMY      COLON SURGERY      FRACTURE SURGERY      HAND SURGERY      JOINT REPLACEMENT      bilateral hip replacement    LITHOTRIPSY      ORTHOPEDIC SURGERY      right elbow & left hand & club feet    OTHER SURGICAL HISTORY      removal mediport    SMALL INTESTINE SURGERY  4/9/13    EXPLORATORY LAPAROTOMY; SMALL BOWEL RESECTION; LYSIS OF ADHESIONS;    TONSILLECTOMY      UPPER GASTROINTESTINAL ENDOSCOPY  9/10/14    with dilatation    UPPER GASTROINTESTINAL ENDOSCOPY  9-17-14    EGD with dilitation    UPPER GASTROINTESTINAL ENDOSCOPY  04/18/2018    EGD with duodenal ulcer clip       Home medications:   Previous Medications    AMLODIPINE (NORVASC) 2.5 MG TABLET    TAKE ONE TABLET BY MOUTH DAILY    APIXABAN (ELIQUIS) 2.5 MG TABS TABLET    TAKE ONE TABLET BY MOUTH TWICE A DAY    ATORVASTATIN (LIPITOR) 10 MG TABLET    TAKE ONE TABLET BY MOUTH DAILY    DOXAZOSIN (CARDURA) 4 MG TABLET    Take 1 tablet by mouth nightly    METOPROLOL SUCCINATE (TOPROL XL) 25 MG EXTENDED RELEASE TABLET    TAKE ONE TABLET BY MOUTH DAILY    PANTOPRAZOLE (PROTONIX) 40 MG TABLET    Take 40 mg by mouth daily    PREDNISONE (DELTASONE) 20 MG TABLET    Take 3 tablets by mouth daily       Social history:  reports that he quit smoking about 71 years ago. He has never used smokeless tobacco. He reports that he does not drink alcohol and does not use drugs. Family history:  History reviewed.  No pertinent family history. Exam  ED Triage Vitals [05/20/21 1129]   BP Temp Temp Source Pulse Resp SpO2 Height Weight   (!) 141/64 97.6 °F (36.4 °C) Oral 56 16 93 % 1' (0.305 m) 189 lb 9.5 oz (86 kg)     Nursing note and vitals reviewed. Constitutional: Well developed, well nourished. Non-toxic in appearance. HENT:      Head: Normocephalic and atraumatic. Ears: External ears normal.      Nose: Nose normal.     Mouth: Membrane mucosa moist and pink. Eyes: Anicteric sclera. No discharge. Neck: Supple. Trachea midline. Cardiovascular: RRR; no murmurs, rubs, or gallops. DP and PT 2+ and symmetric, trace pedal edema b/l. Pulmonary/Chest: Effort normal. No respiratory distress. Faint scattered rhonchi. No stridor. No wheezes. No rales. Abdominal: Soft. No distension. Nontender to deep palpation all quadrants. Musculoskeletal: Moves all extremities. Deformity of right hip and lateral thigh, right thigh compartment firm, skin intact. Exquisite tenderness to palpation greater trochanteric region. Neurological: Alert and orientedx4. Face symmetric. Speech is clear. 5/5 motor and sensation grossly intact b/l distal lower extremities. Skin: Warm and dry. No rash. Psychiatric: Normal mood and affect.  Behavior is normal.    Procedures      EKG    EKG was reviewed by emergency department physician in the absence of a cardiologist    Wide complex paced rhythm, rate 64, leftward axis, wide QRS intervals, normal Qtc, no abnormal ST elevations or depressions, normal t-wave morphology, impression ventricular paced rhythm    Radiology  XR CHEST PORTABLE   Final Result   Small right pleural effusion      Bilateral linear pulmonary opacities could represent edema or infection         XR HIP 2-3 VW W PELVIS RIGHT   Final Result   Angulated displaced fracture proximal femoral diaphysis             Labs  Results for orders placed or performed during the hospital encounter of 05/20/21   CBC Auto Differential Result Value Ref Range    WBC 4.0 4.0 - 11.0 K/uL    RBC 3.06 (L) 4.20 - 5.90 M/uL    Hemoglobin 9.6 (L) 13.5 - 17.5 g/dL    Hematocrit 28.0 (L) 40.5 - 52.5 %    MCV 91.5 80.0 - 100.0 fL    MCH 31.4 26.0 - 34.0 pg    MCHC 34.3 31.0 - 36.0 g/dL    RDW 14.3 12.4 - 15.4 %    Platelets 587 (L) 362 - 450 K/uL    MPV 8.1 5.0 - 10.5 fL    Neutrophils % 78.9 %    Lymphocytes % 13.1 %    Monocytes % 7.6 %    Eosinophils % 0.1 %    Basophils % 0.3 %    Neutrophils Absolute 3.1 1.7 - 7.7 K/uL    Lymphocytes Absolute 0.5 (L) 1.0 - 5.1 K/uL    Monocytes Absolute 0.3 0.0 - 1.3 K/uL    Eosinophils Absolute 0.0 0.0 - 0.6 K/uL    Basophils Absolute 0.0 0.0 - 0.2 K/uL   Basic Metabolic Panel w/ Reflex to MG   Result Value Ref Range    Sodium 143 136 - 145 mmol/L    Potassium reflex Magnesium 4.3 3.5 - 5.1 mmol/L    Chloride 107 99 - 110 mmol/L    CO2 24 21 - 32 mmol/L    Anion Gap 12 3 - 16    Glucose 109 (H) 70 - 99 mg/dL    BUN 32 (H) 7 - 20 mg/dL    CREATININE 1.6 (H) 0.8 - 1.3 mg/dL    GFR Non- 41 (A) >60    GFR  49 (A) >60    Calcium 8.8 8.3 - 10.6 mg/dL   Protime-INR   Result Value Ref Range    Protime 19.1 (H) 10.0 - 13.2 sec    INR 1.64 (H) 0.86 - 1.14   EKG 12 Lead   Result Value Ref Range    Ventricular Rate 64 BPM    Atrial Rate 34 BPM    QRS Duration 176 ms    Q-T Interval 506 ms    QTc Calculation (Bazett) 522 ms    R Axis -35 degrees    T Axis 138 degrees    Diagnosis       Ventricular-paced rhythm with occasional supraventricular complexesAbnormal ECGWhen compared with ECG of 10-MAY-2019 11:12,Electronic ventricular pacemaker has replaced Atrial fibrillation       Screenings           MDM and ED Course    Patient afebrile and nontoxic. No distress. Right hip and thigh with deformity, plain films reveal proximal femur diaphyseal fracture. RLE is neurovascularly intact. Low suspicion for compartment syndrome.  Lab workup obtained for pre-operative clearance, show CKD at baseline, otherwise unremarkable. CXR without increased perihilar markings, suspect mild pulmonary vasculature congestion, clinically nothing to suggest pneumonia. Patient required multiple doses of pain medication for adequate control, given his advanced age and decreased kidney function I am hesitant to provide larger doses at a single time interval. On my re-evlaluation he remained without distress and pain was controlled. Case discussed with Jessica Edmondson NP for orthopedics and reviewed with Dr. Ernst Morfin, hip fracture complicated and necessary resources for repair not available at this facility, recommend transfer to . Case discussed with Dr. Gibson Vega for orthopedics with  who recommends ED to ED transfer. Discussed with ED physician Dr. Jax Koenig who will accept. Plain discussed with patient and he is agreeable for transfer. Final Impression  1. Other closed fracture of shaft of right femur, initial encounter ([facility] [address]    2. Chronic renal impairment, unspecified CKD stage        Blood pressure 134/62, pulse 77, temperature 97.6 °F (36.4 °C), temperature source Oral, resp. rate 21, height 1' (0.305 m), weight 189 lb 9.5 oz (86 kg), SpO2 95 %. Disposition:  DISPOSITION Decision To Transfer 05/20/2021 03:01:35 PM      Patient Referrals:  No follow-up provider specified. Discharge Medications:  New Prescriptions    No medications on file       Discontinued Medications:  Discontinued Medications    No medications on file       This chart was generated using the localbacon dictation system. I created this record but it may contain dictation errors given the limitations of this technology.     Abdoul Salinas DO (electronically signed)  Attending Emergency Physician       Abdoul Salinas DO  05/20/21 8314

## 2021-05-20 NOTE — Clinical Note
Patient Class: Inpatient [101]  REQUIRED: Diagnosis: Hip fracture requiring operative repair, right, closed, initial encounter [facility]) [[id number]]  Estimated Length of Stay: Estimated stay of more than 2 midnights  Admitting Provider: Cassandra Patel [phone]  Telemetry/Cardiac Monitoring Required?: Yes

## 2021-05-20 NOTE — H&P
Hospital Medicine History & Physical      PCP: Jenny Prado, MADHU - CNP    Date of Admission: 5/20/2021    Date of Service: Pt seen/examined on *** and Admitted to Inpatient with expected LOS greater than two midnights due to medical therapy. *** Placed in Observation.     Chief Complaint:  ***      History Of Present Illness:  (***4+ of Location, Quality, Severity, Duration, Timing, Context, Modifying/Alleviating factors and Assoc Signs/Sxs***)    80 y.o. male who presented to [facility] with ***    Past Medical History:          Diagnosis Date    Acute respiratory failure with hypoxia (HCC)     Acute upper GI bleed     Arthritis     Closed fracture [facility] wall ([facility] 5/7/2019    Dyspnea 6/18/2013    Elevated hemidiaphragm     Femur fracture ([facility]) 8/28/2014    GI bleeding 4/18/2018    History of GI bleed     Hyperglycemia 5/7/2019    Hyperkalemia 4/8/2013    Hyperlipidemia     Hypertension     Hypotension 4/18/2018    Myasthenia gravis     Pleural effusion     Pneumonia of left lower lobe due to infectious organism     Polycystic kidney     Syncope, cardiogenic     UGI bleed 4/23/2018       Past Surgical History:          Procedure Laterality Date    BACK SURGERY  2005    CATARACT REMOVAL WITH IMPLANT Right 2/22/13    CATARACT REMOVAL WITH IMPLANT Left 3/22/13    CHOLECYSTECTOMY      COLON SURGERY      FRACTURE SURGERY      HAND SURGERY      JOINT REPLACEMENT      bilateral hip replacement    LITHOTRIPSY      ORTHOPEDIC SURGERY      right elbow & left hand & club feet    OTHER SURGICAL HISTORY      removal mediport    SMALL INTESTINE SURGERY  4/9/13    EXPLORATORY LAPAROTOMY; SMALL BOWEL RESECTION; LYSIS OF ADHESIONS;    TONSILLECTOMY      UPPER GASTROINTESTINAL ENDOSCOPY  9/10/14    with dilatation    UPPER GASTROINTESTINAL ENDOSCOPY  9-17-14    EGD with dilitation    UPPER GASTROINTESTINAL ENDOSCOPY  04/18/2018    EGD with duodenal ulcer clip       Medications Prior to Admission:      Prior to Admission medications    Medication Sig Start Date End Date Taking? Authorizing Provider   metoprolol succinate (TOPROL XL) 25 MG extended release tablet TAKE ONE TABLET BY MOUTH DAILY 9/10/20  Yes MADHU Grant CNP   atorvastatin (LIPITOR) 10 MG tablet TAKE ONE TABLET BY MOUTH DAILY 9/10/20  Yes MADHU Grant CNP   apixaban (ELIQUIS) 2.5 MG TABS tablet TAKE ONE TABLET BY MOUTH TWICE A DAY 9/10/20  Yes MADHU Grant CNP   amLODIPine (NORVASC) 2.5 MG tablet TAKE ONE TABLET BY MOUTH DAILY 9/10/20  Yes MADHU Grant CNP   doxazosin (CARDURA) 4 MG tablet Take 1 tablet by mouth nightly 9/10/20  Yes MADHU Grant CNP   predniSONE (DELTASONE) 20 MG tablet Take 3 tablets by mouth daily  Patient taking differently: Take 60 mg by mouth daily as needed (for myasthnia gravis)  1/29/20  Yes Florencio Ren MD   pantoprazole (PROTONIX) 40 MG tablet Take 40 mg by mouth daily   Yes Historical Provider, MD       Allergies:  Hydralazine, Indomethacin, Mestinon [pyridostigmine bromide], and Piroxicam    Social History:      The patient currently lives ***    TOBACCO:   reports that he quit smoking about 71 years ago. He has never used smokeless tobacco.  ETOH:   reports no history of alcohol use. Family History:      *** Reviewed in detail and negative for DM, CAD, Cancer, CVA. Positive as follows:    History reviewed. No pertinent family history. REVIEW OF SYSTEMS:   Pertinent positives as noted in the HPI. All other systems reviewed and negative. PHYSICAL EXAM PERFORMED:    BP (!) 149/63   Pulse 56   Temp 97.6 °F (36.4 °C) (Oral)   Resp 16   Ht 1' (0.305 m)   Wt 189 lb 9.5 oz (86 kg)   SpO2 95%   .70 kg/m²     General appearance:  No apparent distress, appears stated age and cooperative. HEENT:  Normal cephalic, atraumatic without obvious deformity. Pupils equal, round, and reactive to light. Extra ocular muscles intact. Conjunctivae/corneas clear. Neck: Supple, with full range of motion. No jugular venous distention. Trachea midline. Respiratory:  Normal respiratory effort. Clear to auscultation, bilaterally without Rales/Wheezes/Rhonchi. Cardiovascular:  Regular rate and rhythm with normal S1/S2 without murmurs, rubs or gallops. Abdomen: Soft, non-tender, non-distended with normal bowel sounds. Musculoskeletal:  No clubbing, cyanosis or edema bilaterally. Full range of motion without deformity. Skin: Skin color, texture, turgor normal.  No rashes or lesions. Neurologic:  Neurovascularly intact without any focal sensory/motor deficits. Cranial nerves: II-XII intact, grossly non-focal.  Psychiatric:  Alert and oriented, thought content appropriate, normal insight  Capillary Refill: Brisk,< 3 seconds   Peripheral Pulses: +2 palpable, equal bilaterally       Labs:     Recent Labs     05/20/21  1155   WBC 4.0   HGB 9.6*   HCT 28.0*   *     Recent Labs     05/20/21  1155      K 4.3      CO2 24   BUN 32*   CREATININE 1.6*   CALCIUM 8.8     No results for input(s): AST, ALT, BILIDIR, BILITOT, ALKPHOS in the last 72 hours. Recent Labs     05/20/21  1155   INR 1.64*     No results for input(s): [place] in the last 72 hours.     Urinalysis:      Lab Results   Component Value Date    NITRU Negative 05/07/2019    WBCUA 2 05/07/2019    BACTERIA Rare 04/11/2013    RBCUA 3 05/07/2019    BLOODU LARGE 11/14/2019    BLOODU Negative 05/07/2019    SPECGRAV 1.020 11/14/2019    SPECGRAV 1.016 05/07/2019    GLUCOSEU NEG 11/14/2019    GLUCOSEU Negative 05/07/2019       Radiology:     CXR: I have reviewed the CXR with the following interpretation: ***  EKG:  I have reviewed the EKG with the following interpretation: ***    XR CHEST PORTABLE   Final Result   Small right pleural effusion      Bilateral linear pulmonary opacities could represent edema or infection         XR HIP 2-3 VW W PELVIS RIGHT   Final Result

## 2021-05-20 NOTE — PROGRESS NOTES
Medication Reconciliation     List of medications patient is currently taking is complete. Source of information: 1. Conversation with patient at bedside                                       2. EPIC records     Notes regarding home medications:   1. Patient received all of his morning home medications prior to arrival to the emergency department today.     [address]maña intern   5/20/2021 1:23 PM

## 2021-05-20 NOTE — ED NOTES
Report called and given to Reyna Carrion RN. All questions answered. Okay for transfer.      Amadeo Browning RN  05/20/21 1931

## 2021-06-15 ENCOUNTER — NURSE ONLY (OUTPATIENT)
Dept: CARDIOLOGY CLINIC | Age: 86
End: 2021-06-15
Payer: MEDICARE

## 2021-06-15 DIAGNOSIS — Z95.0 CARDIAC PACEMAKER RECIPIENT: ICD-10-CM

## 2021-06-15 DIAGNOSIS — R00.1 BRADYCARDIA, SINUS: ICD-10-CM

## 2021-06-15 DIAGNOSIS — I48.91 ATRIAL FIBRILLATION WITH SLOW VENTRICULAR RESPONSE (HCC): ICD-10-CM

## 2021-06-15 NOTE — LETTER
[address] [facility] [phone] [address] [facility] [phone]    Pacemaker/Defibrillator Clinic    06/16/21      [address] [facility] [address]      Dear Shabaan Oh    This letter is to inform you that we received the transmission from your monitor at home that checks your implanted heart device. The next date your monitor will automatically transmit will be 9/16. If your report needs attention we will notify you. Your device and monitor are wireless and most transmit cellularly, but please periodically check your monitor is still plugged in to the electrical outlet. If you still use the telephone land line to send please ensure the connection to the phone salvador is secure. This will help to ensure successful automatic transmissions in the future. Also, the monitor needs to be close to you while sleeping at night. Please be aware that the remote device transmission sites are periodically monitored only during regular business hours during which simultaneous in-office device clinics are being run. If your transmission requires attention, we will contact you as soon as possible. **PLEASE NOTE** that our [facility] policy and processes are changing to ensure a more seamless approach for all parties involved, allowing more time for our nurses to address patient issues and concerns. We will no longer be sending letters for NORMAL remote transmissions. You will be contacted by phone if your transmission requires attention (as previously done), and letters will only be sent regarding monitor disconnections or missed transmissions if you are unable to be reached by phone. Please do not be alarmed by this new process, as we will continue to contact you if your transmission report requires attention. This will be your final \"remote received\" letter.   From this point forward, the [facility] will be utilizing the no news is good news approach. As always, please feel free to contact your nurse with any questions or concerns. Thank you.     Celine López

## 2021-06-16 PROCEDURE — 93296 REM INTERROG EVL PM/IDS: CPT | Performed by: INTERNAL MEDICINE

## 2021-06-16 PROCEDURE — 93294 REM INTERROG EVL PM/LDLS PM: CPT | Performed by: INTERNAL MEDICINE

## 2021-06-16 NOTE — PROGRESS NOTES
We received remote transmission from patient's monitor at home. Transmission shows normal sensing and pacing function. Pt is on Eliquis for known AF. EP physician will review. See interrogation under cardiology tab in the [address] [place] [address] [address] field for more details.

## 2021-07-14 ENCOUNTER — TELEPHONE (OUTPATIENT)
Dept: INTERNAL MEDICINE CLINIC | Age: 86
End: 2021-07-14

## 2021-07-14 NOTE — TELEPHONE ENCOUNTER
Patient's daughter dropping off assessment form for assisted living patient is being discharged [facility] DR SRIDHAR GOMEZ

## 2021-07-16 ENCOUNTER — TELEPHONE (OUTPATIENT)
Dept: INTERNAL MEDICINE CLINIC | Age: 86
End: 2021-07-16

## 2021-07-21 ENCOUNTER — TELEPHONE (OUTPATIENT)
Dept: INTERNAL MEDICINE CLINIC | Age: 86
End: 2021-07-21

## 2021-07-21 NOTE — TELEPHONE ENCOUNTER
Pt's daughter called. She asked if Ofelia Bal will write an Rx for electric wheelchair that has a seat that elevates to countertop height. ([address]). Pt cannot bear weight on leg. Amira Killian would like to pick this up today please. Call Moy.

## 2021-08-24 ENCOUNTER — TELEPHONE (OUTPATIENT)
Dept: INTERNAL MEDICINE CLINIC | Age: 86
End: 2021-08-24

## 2021-08-24 NOTE — TELEPHONE ENCOUNTER
PT daughter is calling to change the phone number on his account and would also like to have a list of medication that he is taking to compare to where he was living. Says that he is going to be living with himself again and will be taking medication on his own. Please advise.

## 2021-08-25 NOTE — TELEPHONE ENCOUNTER
Judi Dinero LPN at 4/07/5782  4:50 PM    Status: Signed      Please advise      Ev Thorne at 8/24/2021 12:47 PM    Status: Signed            [facility]ed a med list for the doctor to sign off on. Pt has a closed wound on right upper arm. It is healing but they want a barrier to put on it,  maybe a dry dressing.       Fax to [phone]      Okay to give verbal order for barrier cream.

## 2021-08-25 NOTE — TELEPHONE ENCOUNTER
Tea Dickey states she needs a verbal then will send order at end of the month. Can be any barrier oint or aquaphor and CDD non stick apply tape and how often to change it. Family can get supplies then from pharmacy. Does not need sent over.

## 2021-08-27 ENCOUNTER — TELEPHONE (OUTPATIENT)
Dept: INTERNAL MEDICINE CLINIC | Age: 86
End: 2021-08-27

## 2021-08-30 DIAGNOSIS — N48.1 BALANITIS: ICD-10-CM

## 2021-08-30 DIAGNOSIS — E78.2 MIXED HYPERLIPIDEMIA: Primary | ICD-10-CM

## 2021-08-30 PROBLEM — N28.89 RENAL MASS: Status: ACTIVE | Noted: 2020-01-07

## 2021-08-30 PROBLEM — M48.061 LUMBAR STENOSIS: Status: ACTIVE | Noted: 2020-07-28

## 2021-08-30 PROBLEM — E66.3 OVERWEIGHT: Status: ACTIVE | Noted: 2020-07-07

## 2021-08-30 PROBLEM — M51.36 DEGENERATION OF INTERVERTEBRAL DISC OF LUMBAR REGION: Status: ACTIVE | Noted: 2020-07-28

## 2021-08-30 PROBLEM — M54.50 LOW BACK PAIN: Status: ACTIVE | Noted: 2020-07-28

## 2021-08-30 RX ORDER — ATORVASTATIN CALCIUM 20 MG/1
TABLET, FILM COATED ORAL
Qty: 90 TABLET | Refills: 1 | Status: SHIPPED | OUTPATIENT
Start: 2021-08-30

## 2021-08-30 RX ORDER — POLYETHYLENE GLYCOL 3350 17 G/17G
17 POWDER, FOR SOLUTION ORAL DAILY
COMMUNITY
Start: 2021-05-29

## 2021-08-30 RX ORDER — FLUCONAZOLE 150 MG/1
150 TABLET ORAL ONCE
Qty: 1 TABLET | Refills: 0 | Status: SHIPPED | OUTPATIENT
Start: 2021-08-30 | End: 2021-08-30

## 2021-08-30 NOTE — TELEPHONE ENCOUNTER
Let nurses know I sent in one order of diflucan to help and continue nystatin powder. Also UC increased the atorvastatin to 20  mg daily.

## 2021-08-30 NOTE — TELEPHONE ENCOUNTER
Called [facility], they are applying nystatin to groin area, believe aides were not applying pericare correctly. They were educated.  Waiting on UA results will fax once obtained

## 2021-09-07 NOTE — PLAN OF CARE
Problem: Pain:  Goal: Pain level will decrease  Description  Pain level will decrease  Outcome: Ongoing  Note:   Patient uses pain scale appropriately. Goal: Control of acute pain  Description  Control of acute pain  5/10/2019 0330 by Kelvin Riley RN  Outcome: Ongoing     Problem: Falls - Risk of:  Goal: Will remain free from falls  Description  Will remain free from falls  5/10/2019 1630 by Faith Del Angel RN  Outcome: Ongoing  Note:   Patient free of falls this shift, all safety precautions in place. 5/10/2019 0330 by Kelvin Riley RN  Outcome: Ongoing  Goal: Absence of physical injury  Description  Absence of physical injury  Outcome: Ongoing  Note:   All safety precautions in place including nonskid socks on feet, bed exit alarm on and posey clip attached to patient when in chair, phones and call light in reach, will continue to monitor. Problem: Risk for Impaired Skin Integrity  Goal: Tissue integrity - skin and mucous membranes  Description  Structural intactness and normal physiological function of skin and  mucous membranes. Outcome: Ongoing  Note:   Repositioning every 2 hours when in bed or chair. Problem: Infection:  Goal: Will remain free from infection  Description  Will remain free from infection  Outcome: Ongoing  Note:   Skin assessment performed for signs and symptoms of infection. Problem: Discharge Planning:  Goal: Patients continuum of care needs are met  Description  Patients continuum of care needs are met  Outcome: Ongoing  Note:   Discharge planning started upon admission,  consult in place. Pt is calling in re to status of refill - Aspirin-Dipyridamole (AGGRENOX)  MG.  Please forward to pharmacy - Pt is out.

## 2021-09-15 ENCOUNTER — OFFICE VISIT (OUTPATIENT)
Dept: INTERNAL MEDICINE CLINIC | Age: 86
End: 2021-09-15
Payer: MEDICARE

## 2021-09-15 ENCOUNTER — TELEPHONE (OUTPATIENT)
Dept: INTERNAL MEDICINE CLINIC | Age: 86
End: 2021-09-15

## 2021-09-15 DIAGNOSIS — Z87.440 H/O URINARY TRACT INFECTION: ICD-10-CM

## 2021-09-15 DIAGNOSIS — N39.0 URINARY TRACT INFECTION WITHOUT HEMATURIA, SITE UNSPECIFIED: Primary | ICD-10-CM

## 2021-09-15 DIAGNOSIS — Z00.00 ROUTINE GENERAL MEDICAL EXAMINATION AT A HEALTH CARE FACILITY: ICD-10-CM

## 2021-09-15 PROCEDURE — 1123F ACP DISCUSS/DSCN MKR DOCD: CPT | Performed by: NURSE PRACTITIONER

## 2021-09-15 PROCEDURE — G0438 PPPS, INITIAL VISIT: HCPCS | Performed by: NURSE PRACTITIONER

## 2021-09-15 PROCEDURE — 4040F PNEUMOC VAC/ADMIN/RCVD: CPT | Performed by: NURSE PRACTITIONER

## 2021-09-15 RX ORDER — CIPROFLOXACIN 500 MG/1
500 TABLET, FILM COATED ORAL 2 TIMES DAILY
Qty: 20 TABLET | Refills: 0 | Status: SHIPPED | OUTPATIENT
Start: 2021-09-15 | End: 2021-09-25

## 2021-09-15 SDOH — ECONOMIC STABILITY: FOOD INSECURITY: WITHIN THE PAST 12 MONTHS, THE FOOD YOU BOUGHT JUST DIDN'T LAST AND YOU DIDN'T HAVE MONEY TO GET MORE.: NEVER TRUE

## 2021-09-15 SDOH — ECONOMIC STABILITY: FOOD INSECURITY: WITHIN THE PAST 12 MONTHS, YOU WORRIED THAT YOUR FOOD WOULD RUN OUT BEFORE YOU GOT MONEY TO BUY MORE.: NEVER TRUE

## 2021-09-15 ASSESSMENT — PATIENT HEALTH QUESTIONNAIRE - PHQ9
1. LITTLE INTEREST OR PLEASURE IN DOING THINGS: 0
SUM OF ALL RESPONSES TO PHQ QUESTIONS 1-9: 0
2. FEELING DOWN, DEPRESSED OR HOPELESS: 0
SUM OF ALL RESPONSES TO PHQ9 QUESTIONS 1 & 2: 0
SUM OF ALL RESPONSES TO PHQ QUESTIONS 1-9: 0
SUM OF ALL RESPONSES TO PHQ QUESTIONS 1-9: 0

## 2021-09-15 ASSESSMENT — LIFESTYLE VARIABLES
AUDIT TOTAL SCORE: INCOMPLETE
HOW OFTEN DO YOU HAVE A DRINK CONTAINING ALCOHOL: 0
HOW OFTEN DO YOU HAVE A DRINK CONTAINING ALCOHOL: NEVER
AUDIT-C TOTAL SCORE: INCOMPLETE

## 2021-09-15 ASSESSMENT — SOCIAL DETERMINANTS OF HEALTH (SDOH): HOW HARD IS IT FOR YOU TO PAY FOR THE VERY BASICS LIKE FOOD, HOUSING, MEDICAL CARE, AND HEATING?: NOT HARD AT ALL

## 2021-09-15 NOTE — TELEPHONE ENCOUNTER
Please call [facility] to let them know that I placed UA and CX orders for 9/20/21. Patient states that he is getting labs down at [facility] lab for DR Madonna Webster that day , please have them make sure that he gets the urine testing done at [facility] or with them.

## 2021-09-15 NOTE — PATIENT INSTRUCTIONS
Personalized Preventive Plan for Lula Vasquez - 9/15/2021  Medicare offers a range of preventive health benefits. Some of the tests and screenings are paid in full while other may be subject to a deductible, co-insurance, and/or copay. Some of these benefits include a comprehensive review of your medical history including lifestyle, illnesses that may run in your family, and various assessments and screenings as appropriate. After reviewing your medical record and screening and assessments performed today your provider may have ordered immunizations, labs, imaging, and/or referrals for you. A list of these orders (if applicable) as well as your Preventive Care list are included within your After Visit Summary for your review. Other Preventive Recommendations:    · A preventive eye exam performed by an eye specialist is recommended every 1-2 years to screen for glaucoma; cataracts, macular degeneration, and other eye disorders. · A preventive dental visit is recommended every 6 months. · Try to get at least 150 minutes of exercise per week or 10,000 steps per day on a pedometer . · Order or download the FREE \"Exercise & Physical Activity: Your Everyday Guide\" from [employer]. Call 1-260.547.7517 or search [employer] online. · You need 8625-0205 mg of calcium and 2391-5725 IU of vitamin D per day. It is possible to meet your calcium requirement with diet alone, but a vitamin D supplement is usually necessary to meet this goal.  · When exposed to the sun, use a sunscreen that protects against both UVA and UVB radiation with an SPF of 30 or greater. Reapply every 2 to 3 hours or after sweating, drying off with a towel, or swimming. · Always wear a seat belt when traveling in a car. Always wear a helmet when riding a bicycle or motorcycle.

## 2021-09-15 NOTE — PROGRESS NOTES
Medicare Annual Wellness Visit  Are Name: Mirna Adame Date: 9/15/2021   MRN: <[id number]> Sex: Male   Age: 80 y.o. Ethnicity: Non- / Non    : 12/15/1929 Race: White (non-)      Ghulam Anders is here for Medicare AWV    Screenings for behavioral, psychosocial and functional/safety risks, and cognitive dysfunction are all negative except as indicated below. These results, as well as other patient data from the [address] form, are documented in Flowsheets linked to this Encounter. Allergies   Allergen Reactions    Hydralazine      Upset stomach    Indomethacin      Upset stomach    Mestinon [Pyridostigmine Bromide]      C/o upset stomach    Piroxicam      Upset stomach       Prior to Visit Medications    Medication Sig Taking?  Authorizing Provider   ciprofloxacin (CIPRO) 500 MG tablet Take 1 tablet by mouth 2 times daily for 10 days Yes MADHU Grant CNP   polyethylene glycol (GLYCOLAX) 17 GM/SCOOP powder Take 17 g by mouth daily Yes Historical Provider, MD   cyanocobalamin 1000 MCG tablet Take 1,000 mcg by mouth once a week Yes Historical Provider, MD   atorvastatin (LIPITOR) 20 MG tablet TAKE ONE TABLET BY MOUTH DAILY Yes Adamaris Silverio MD   metoprolol succinate (TOPROL XL) 25 MG extended release tablet TAKE ONE TABLET BY MOUTH DAILY Yes MADHU Grant CNP   apixaban (ELIQUIS) 2.5 MG TABS tablet TAKE ONE TABLET BY MOUTH TWICE A DAY Yes MADHU Grant CNP   amLODIPine (NORVASC) 2.5 MG tablet TAKE ONE TABLET BY MOUTH DAILY Yes MADHU Grant CNP   doxazosin (CARDURA) 4 MG tablet Take 1 tablet by mouth nightly Yes MADHU Grant CNP   predniSONE (DELTASONE) 20 MG tablet Take 3 tablets by mouth daily  Patient taking differently: Take 60 mg by mouth daily as needed (for myasthnia gravis)  Yes Radha Samson MD   pantoprazole (PROTONIX) 40 MG tablet Take 40 mg by mouth daily Yes Historical Provider, MD       Past Medical History:   Diagnosis Date    Acute respiratory failure with hypoxia (HCC)     Acute upper GI bleed     Arthritis     Closed fracture of orbital wall ([facility]) 5/7/2019    Dyspnea 6/18/2013    Elevated hemidiaphragm     Femur fracture ([facility]) 8/28/2014    GI bleeding 4/18/2018    History of GI bleed     Hyperglycemia 5/7/2019    Hyperkalemia 4/8/2013    Hyperlipidemia     Hypertension     Hypotension 4/18/2018    Myasthenia gravis     Pleural effusion     Pneumonia of left lower lobe due to infectious organism     Polycystic kidney     Syncope, cardiogenic     UGI bleed 4/23/2018       Past Surgical History:   Procedure Laterality Date    BACK SURGERY  2005    CATARACT REMOVAL WITH IMPLANT Right 2/22/13    CATARACT REMOVAL WITH IMPLANT Left 3/22/13    CHOLECYSTECTOMY      COLON SURGERY      FRACTURE SURGERY      HAND SURGERY      JOINT REPLACEMENT      bilateral hip replacement    LITHOTRIPSY      ORTHOPEDIC SURGERY      right elbow & left hand & club feet    OTHER SURGICAL HISTORY      removal mediport    SMALL INTESTINE SURGERY  4/9/13    EXPLORATORY LAPAROTOMY; SMALL BOWEL RESECTION; LYSIS OF ADHESIONS;    TONSILLECTOMY      UPPER GASTROINTESTINAL ENDOSCOPY  9/10/14    with dilatation    UPPER GASTROINTESTINAL ENDOSCOPY  9-17-14    EGD with dilitation    UPPER GASTROINTESTINAL ENDOSCOPY  04/18/2018    EGD with duodenal ulcer clip       No family history on file. CareTeam (Including outside providers/suppliers regularly involved in providing care):   Patient Care Team:  MADHU Vann CNP as PCP - General (Nurse Practitioner)  MADHU Vann CNP as PCP - [facility] Empaneled Provider    Wt Readings from Last 3 Encounters:   05/20/21 189 lb 9.5 oz (86 kg)   03/19/21 182 lb (82.6 kg)   01/26/21 185 lb (83.9 kg)      No flowsheet data found. There is no height or weight on file to calculate BMI.     Based upon direct observation of the patient, evaluation of cognition reveals recent and remote memory intact. Patient's complete Health Risk Assessment and screening values have been reviewed and are found in Flowsheets. The following problems were reviewed today and where indicated follow up appointments were made and/or referrals ordered. Positive Risk Factor Screenings with Interventions:           Health Habits/Nutrition:  Health Habits/Nutrition  Do you exercise for at least 20 minutes 2-3 times per week?: Yes  Have you lost any weight without trying in the past 3 months?: No  Do you eat only one meal per day?: No  Have you seen the dentist within the past year?: (!) No     Health Habits/Nutrition Interventions:  · Dental exam overdue:  patient encouraged to make appointment with his/her dentist    Hearing/Vision:  No exam data present  Hearing/Vision  Do you or your family notice any trouble with your hearing that hasn't been managed with hearing aids?: No  Do you have difficulty driving, watching TV, or doing any of your daily activities because of your eyesight?: No  Have you had an eye exam within the past year?: (!) No  Hearing/Vision Interventions:  · Vision concerns:  patient encouraged to make appointment with his/her eye specialist     ADL:  ADLs  In the past 7 days, did you need help from others to perform any of the following everyday activities? Eating, dressing, grooming, bathing, toileting, or walking/balance?: (!) Eating, Dressing, Grooming, Bathing, Toileting, Walking/Balance  In the past 7 days, did you need help from others to take care of any of the following?  Laundry, housekeeping, banking/finances, shopping, telephone use, food preparation, transportation, or taking medications?: [employer], Housekeeping, [employer], Shopping, Food Preparation, Transportation  ADL Interventions:  · living in [facility] ad getting PT/OT    Personalized Preventive Plan   Current Health Maintenance Status  Immunization History   Administered Date(s) Administered  COVID-19, Pfizer, PF, 30mcg/0.3mL 01/26/2021, 01/26/2021, 02/16/2021, 02/16/2021    Influenza Virus Vaccine 10/18/2019    Influenza, Triv, inactivated, subunit, adjuvanted, IM (Fluad 65 yrs and older) 09/10/2020    PPD Test 01/28/2019    Pneumococcal Conjugate 13-valent ([id number]) 05/15/2019    Pneumococcal Polysaccharide ([id number]) 09/10/2020        Health Maintenance   Topic Date Due    Annual Wellness Visit (AWV)  Never done    Flu vaccine (1) 09/01/2021    DTaP/Tdap/Td vaccine (1 - Tdap) 03/19/2022 (Originally 12/15/1948)    Shingles Vaccine (1 of 2) 03/19/2022 (Originally 12/15/1979)    Lipid screen  04/05/2022    Potassium monitoring  05/20/2022    Creatinine monitoring  05/20/2022    Pneumococcal 65+ years Vaccine  Completed    COVID-19 Vaccine  Completed    Hepatitis A vaccine  Aged Out    Hepatitis B vaccine  Aged Out    Hib vaccine  Aged Out    Meningococcal (ACWY) vaccine  Aged Out     Recommendations for [employer] Due: see orders and patient instructions/AVS.  . Recommended screening schedule for the next 5-10 years is provided to the patient in written form: see Patient Instructions/AVS.    Sanjana Dia was seen today for medicare awv. Diagnoses and all orders for this visit:      H/O urinary tract infection  Was started on Cipro   Needed f/u urine cx   -     Culture, Urine  -     Urinalysis; Future    Routine general medical examination at a health care facility               Ismael [place] is a 80 y.o. male being evaluated by a Virtual Visit (phone) encounter to address concerns as mentioned above. A caregiver was present when appropriate. Due to this being a TeleHealth encounter ([facility] emergency), evaluation of the following organ systems was limited: Vitals/Constitutional/EENT/Resp/CV/GI//MS/Neuro/Skin/Heme-Lymph-Imm.   Pursuant to the emergency declaration under the [address] [facility] waiver authority and the Coronavirus Preparedness and Response Supplemental Appropriations Act, this Virtual Visit was conducted with patient's (and/or legal guardian's) consent, to reduce the patient's risk of exposure to COVID-19 and provide necessary medical care. The patient (and/or legal guardian) has also been advised to contact this office for worsening conditions or problems, and seek emergency medical treatment and/or call 911 if deemed necessary. Patient identification was verified at the start of the visit: Yes    Services were provided through phone to substitute for in-person clinic visit. Patient and provider were located at their individual homes. --MADHU Guajardo CNP on 9/15/2021 at 1:58 PM    An electronic signature was used to authenticate this note.

## 2021-09-15 NOTE — TELEPHONE ENCOUNTER
Patient's daughter Rom Spencer called. She asked if [facility] would send in the Rx for Cipro this morning.

## 2021-09-15 NOTE — TELEPHONE ENCOUNTER
----- Message from Lesly Reis sent at 9/14/2021  5:42 PM EDT -----  Subject: Message to Provider    QUESTIONS  Information for Provider? Rx for Cipro 500mg bid for 14 days , Pharmacy is   in need of a new rx to be sent to [address] [place] [address] at [address]   for pt. [facility] will not be able to fill this rx   for pt. Please call with any questions Arthur Durham at [facility] in [facility]   at [phone].  ---------------------------------------------------------------------------  --------------  Aaron HAYES  What is the best way for the office to contact you? Do not leave any   message, patient will call back for answer  Preferred Call Back Phone Number? [phone]  ---------------------------------------------------------------------------  --------------  SCRIPT ANSWERS  Relationship to Patient? Third Party  Representative Name?  [facility] in [facility]

## 2021-09-16 ENCOUNTER — NURSE ONLY (OUTPATIENT)
Dept: CARDIOLOGY CLINIC | Age: 86
End: 2021-09-16
Payer: MEDICARE

## 2021-09-16 DIAGNOSIS — I48.91 ATRIAL FIBRILLATION WITH SLOW VENTRICULAR RESPONSE (HCC): ICD-10-CM

## 2021-09-16 DIAGNOSIS — Z95.0 CARDIAC PACEMAKER RECIPIENT: ICD-10-CM

## 2021-09-17 PROCEDURE — 93296 REM INTERROG EVL PM/IDS: CPT | Performed by: INTERNAL MEDICINE

## 2021-09-17 PROCEDURE — 93294 REM INTERROG EVL PM/LDLS PM: CPT | Performed by: INTERNAL MEDICINE

## 2021-09-17 NOTE — PROGRESS NOTES
We received remote transmission from patient's dual chamber pacemaker monitor at home. Transmission shows normal sensing and pacing function. Pt is on Eliquis for known AF (100% burden). EP physician will review. See interrogation under cardiology tab in the [address] [place] [address] [address] field for more details.

## 2021-09-21 ENCOUNTER — TELEPHONE (OUTPATIENT)
Dept: INTERNAL MEDICINE CLINIC | Age: 86
End: 2021-09-21

## 2021-09-21 NOTE — TELEPHONE ENCOUNTER
[facility] calling to ask for a copy of the AWV. Their medicare  will review it to see if any notes support pt getting a manual wheelchair.      Fax to [phone]

## 2021-10-12 ENCOUNTER — TELEPHONE (OUTPATIENT)
Dept: INTERNAL MEDICINE CLINIC | Age: 86
End: 2021-10-12

## 2021-10-12 DIAGNOSIS — G70.00 MYASTHENIA GRAVIS (HCC): Primary | ICD-10-CM

## 2021-10-12 RX ORDER — PREDNISONE 20 MG/1
60 TABLET ORAL DAILY PRN
Qty: 45 TABLET | Refills: 0 | Status: SHIPPED | OUTPATIENT
Start: 2021-10-12 | End: 2021-10-27

## 2021-10-12 NOTE — TELEPHONE ENCOUNTER
would like some prednisone for his ( myasthenia Gravis) double vision .  He takes it as he needs it.     kroger [phone]

## 2021-10-20 RX ORDER — METOPROLOL SUCCINATE 25 MG/1
TABLET, EXTENDED RELEASE ORAL
Qty: 90 TABLET | Refills: 0 | Status: SHIPPED | OUTPATIENT
Start: 2021-10-20 | End: 2022-02-08 | Stop reason: SDUPTHER

## 2021-10-20 NOTE — TELEPHONE ENCOUNTER
Last office visit :09/15/2021    Future Appointments   Date Time Provider Davey Winkler   12/16/2021  9:15 AM SCHEDULE, [facility]   3/17/2022  9:15 AM [facility]EDULE, [facility]   6/16/2022  9:15 AM SCHEDULE, [facility]

## 2021-10-26 ENCOUNTER — TELEPHONE (OUTPATIENT)
Dept: INTERNAL MEDICINE CLINIC | Age: 86
End: 2021-10-26

## 2021-10-26 NOTE — TELEPHONE ENCOUNTER
Carlos Avila from [employer] called to check on the orders that was faxed over on 10/20 for the PT and was seeing what the status was on the orders for Occupational Therapy for Out-Patient Therapy.

## 2021-10-28 ENCOUNTER — TELEPHONE (OUTPATIENT)
Dept: INTERNAL MEDICINE CLINIC | Age: 86
End: 2021-10-28

## 2021-10-28 NOTE — TELEPHONE ENCOUNTER
PT called yelling that an order was sent to [facility] to sign and fill out so that he can end is physical therapy, says that they have been sending it every other day for the past week and has yet to get anything and he said that if he is not going to get the proper care from her he would like to see another PCP. I told him that if he wanted that he could establish with another provider since [facility] was leaving but it would not solve the issue at hand.

## 2021-10-29 NOTE — TELEPHONE ENCOUNTER
[facility] signed therapy order. Waiting on a return call.

## 2021-11-09 ENCOUNTER — TELEPHONE (OUTPATIENT)
Dept: INTERNAL MEDICINE CLINIC | Age: 86
End: 2021-11-09

## 2021-11-09 RX ORDER — AMLODIPINE BESYLATE 2.5 MG/1
TABLET ORAL
Qty: 90 TABLET | Refills: 1 | Status: SHIPPED | OUTPATIENT
Start: 2021-11-09

## 2021-11-09 NOTE — TELEPHONE ENCOUNTER
LAST OFFICE VISIT ;09/15/2021      Future Appointments   Date Time Provider Davey Winkler   11/16/2021 11:30 AM Rashi Snider, [address] [facility] - [facility]   12/16/2021  9:15 AM SCHEDULE, [facility]   3/17/2022  9:15 AM SCHEDULE, [facility]   6/16/2022  9:15 AM SCHEDULE, [facility]

## 2021-11-16 ENCOUNTER — OFFICE VISIT (OUTPATIENT)
Dept: INTERNAL MEDICINE CLINIC | Age: 86
End: 2021-11-16
Payer: MEDICARE

## 2021-11-16 VITALS — SYSTOLIC BLOOD PRESSURE: 130 MMHG | OXYGEN SATURATION: 94 % | DIASTOLIC BLOOD PRESSURE: 64 MMHG | HEART RATE: 68 BPM

## 2021-11-16 DIAGNOSIS — I10 PRIMARY HYPERTENSION: Primary | ICD-10-CM

## 2021-11-16 DIAGNOSIS — K21.9 GASTROESOPHAGEAL REFLUX DISEASE, UNSPECIFIED WHETHER ESOPHAGITIS PRESENT: ICD-10-CM

## 2021-11-16 DIAGNOSIS — R39.12 BENIGN PROSTATIC HYPERPLASIA WITH WEAK URINARY STREAM: ICD-10-CM

## 2021-11-16 DIAGNOSIS — N40.1 BENIGN PROSTATIC HYPERPLASIA WITH WEAK URINARY STREAM: ICD-10-CM

## 2021-11-16 DIAGNOSIS — E78.2 MIXED HYPERLIPIDEMIA: ICD-10-CM

## 2021-11-16 PROBLEM — J44.9 CHRONIC OBSTRUCTIVE PULMONARY DISEASE, UNSPECIFIED COPD TYPE (HCC): Status: ACTIVE | Noted: 2021-11-16

## 2021-11-16 PROCEDURE — 4040F PNEUMOC VAC/ADMIN/RCVD: CPT | Performed by: INTERNAL MEDICINE

## 2021-11-16 PROCEDURE — G8427 DOCREV CUR MEDS BY ELIG CLIN: HCPCS | Performed by: INTERNAL MEDICINE

## 2021-11-16 PROCEDURE — 99214 OFFICE O/P EST MOD 30 MIN: CPT | Performed by: INTERNAL MEDICINE

## 2021-11-16 PROCEDURE — G8484 FLU IMMUNIZE NO ADMIN: HCPCS | Performed by: INTERNAL MEDICINE

## 2021-11-16 PROCEDURE — 1123F ACP DISCUSS/DSCN MKR DOCD: CPT | Performed by: INTERNAL MEDICINE

## 2021-11-16 PROCEDURE — 1036F TOBACCO NON-USER: CPT | Performed by: INTERNAL MEDICINE

## 2021-11-16 PROCEDURE — G8417 CALC BMI ABV UP PARAM F/U: HCPCS | Performed by: INTERNAL MEDICINE

## 2021-11-16 RX ORDER — PREDNISONE 20 MG/1
20 TABLET ORAL PRN
COMMUNITY

## 2021-11-16 RX ORDER — DOXAZOSIN MESYLATE 4 MG/1
4 TABLET ORAL NIGHTLY
Qty: 90 TABLET | Refills: 2 | Status: SHIPPED | OUTPATIENT
Start: 2021-11-16

## 2021-11-16 ASSESSMENT — ENCOUNTER SYMPTOMS
BLOOD IN STOOL: 0
ABDOMINAL PAIN: 0
VOMITING: 0
NAUSEA: 0
COUGH: 0
SORE THROAT: 0
SHORTNESS OF BREATH: 0

## 2021-11-16 NOTE — PROGRESS NOTES
Ceci Nieto (:  12/15/1929) is a 80 y.o. male, Established patient, here for evaluation of the following chief complaint(s):  Established New Doctor         ASSESSMENT/PLAN:  1. Primary hypertension  - Stable   - Continue same management with amlodipine and  doxazosin (CARDURA) 4 MG tablet; Take 1 tablet by mouth nightly, Disp-90 tablet, R-2Normal  2. Benign prostatic hyperplasia with weak urinary stream  -     doxazosin (CARDURA) 4 MG tablet; Take 1 tablet by mouth nightly, Disp-90 tablet, R-2Normal  3. Gastroesophageal reflux disease, unspecified whether esophagitis present  - Stable   - Continue same management with pantoprazole  4. Mixed Hyperlipidemia:  - Stable   - Continue same management with atorvastatin      Return in about 6 months (around 2022). Subjective   SUBJECTIVE/OBJECTIVE:  Hypertension  This is a chronic problem. The current episode started more than 1 year ago. The problem is controlled. Pertinent negatives include no chest pain, palpitations or shortness of breath. Risk factors for coronary artery disease include dyslipidemia. Past treatments include alpha 1 blockers and calcium channel blockers. The current treatment provides significant improvement. There are no compliance problems. Hypertensive end-organ damage includes kidney disease. Hyperlipidemia  This is a chronic problem. The current episode started more than 1 year ago. The problem is controlled. Pertinent negatives include no chest pain or shortness of breath. Current antihyperlipidemic treatment includes statins. The current treatment provides significant improvement of lipids. Risk factors for coronary artery disease include dyslipidemia, hypertension and male sex. Gastroesophageal Reflux  He reports no abdominal pain, no chest pain, no coughing, no nausea or no sore throat. This is a chronic problem. The current episode started more than 1 year ago. The problem has been gradually improving.  Pertinent negatives include no fatigue. He has tried a PPI for the symptoms. The treatment provided significant relief. Review of Systems   Constitutional: Negative for fatigue and fever. HENT: Negative for nosebleeds and sore throat. Respiratory: Negative for cough and shortness of breath. Cardiovascular: Negative for chest pain, palpitations and leg swelling. Gastrointestinal: Negative for abdominal pain, blood in stool, nausea and vomiting. Neurological: Negative for dizziness and weakness. Objective   Physical Exam  Constitutional:       Appearance: Normal appearance. Comments: On wheel chair   HENT:      Head: Normocephalic and atraumatic. Eyes:      General: No scleral icterus. Conjunctiva/sclera: Conjunctivae normal.   Cardiovascular:      Rate and Rhythm: Normal rate and regular rhythm. Pulses: Normal pulses. Heart sounds: Normal heart sounds. Pulmonary:      Effort: Pulmonary effort is normal.      Breath sounds: Normal breath sounds. Musculoskeletal:         General: No swelling. Skin:     General: Skin is warm and dry. Neurological:      Mental Status: He is alert and oriented to person, place, and time. Mental status is at baseline. Psychiatric:         Mood and Affect: Mood normal.         Behavior: Behavior normal.              An electronic signature was used to authenticate this note.     --Ventura Wu MD

## 2021-11-16 NOTE — PROGRESS NOTES
Zachary Soler (:  12/15/1929) is a 80 y.o. male, Established patient, here for evaluation of the following chief complaint(s):  Established New Doctor         ASSESSMENT/PLAN:  1. Primary hypertension  -     doxazosin (CARDURA) 4 MG tablet; Take 1 tablet by mouth nightly, Disp-90 tablet, R-2Normal  2. Benign prostatic hyperplasia with weak urinary stream  -     doxazosin (CARDURA) 4 MG tablet; Take 1 tablet by mouth nightly, Disp-90 tablet, R-2Normal  3. Gastroesophageal reflux disease, unspecified whether esophagitis present      No follow-ups on file. Subjective   SUBJECTIVE/OBJECTIVE:  HPI    Review of Systems       Objective   Physical Exam         An electronic signature was used to authenticate this note.     --Imani Butts MD

## 2021-11-17 ENCOUNTER — TELEPHONE (OUTPATIENT)
Dept: CARDIOLOGY CLINIC | Age: 86
End: 2021-11-17

## 2021-11-17 NOTE — TELEPHONE ENCOUNTER
Pt informed of the following: This call is to inform you that your remote monitor for your pacemaker and/or defibrillator, or implanted heart monitor is disconnected. At your earliest convenience, please check your home monitor so we can ensure your cardiac device is functioning normally. Your monitor is wireless and checks your device nightly and automatically transmits. If you are having problems with the connection or machine, please call the toll free number on your equipment or below for assistance.      [address] [place] [address] [address]

## 2021-12-15 DIAGNOSIS — I48.91 ATRIAL FIBRILLATION WITH SLOW VENTRICULAR RESPONSE (HCC): ICD-10-CM

## 2021-12-15 NOTE — TELEPHONE ENCOUNTER
Future Appointments   Date Time Provider Davey Winkler   12/16/2021  9:15 AM SCHEDULE, [facility]   3/17/2022  9:15 AM SCHEDULE, [facility]   6/16/2022  9:15 AM SCHEDULE, [facility]     Last appt on 11.16.2021

## 2021-12-16 ENCOUNTER — NURSE ONLY (OUTPATIENT)
Dept: CARDIOLOGY CLINIC | Age: 86
End: 2021-12-16
Payer: MEDICARE

## 2021-12-16 DIAGNOSIS — I48.91 ATRIAL FIBRILLATION WITH SLOW VENTRICULAR RESPONSE (HCC): ICD-10-CM

## 2021-12-16 DIAGNOSIS — Z95.0 CARDIAC PACEMAKER RECIPIENT: ICD-10-CM

## 2021-12-17 ENCOUNTER — TELEPHONE (OUTPATIENT)
Dept: INTERNAL MEDICINE CLINIC | Age: 86
End: 2021-12-17

## 2021-12-17 NOTE — PROGRESS NOTES
We received remote transmission from patient's dual chamber pacemaker monitor at home. Transmission shows normal sensing and pacing function. Pt is on Eliquis for known AF (100% burden). EP physician will review. See interrogation under cardiology tab in the [address] [place] [address] [address] field for more details. Will continue to monitor remotely.

## 2021-12-18 PROCEDURE — 93294 REM INTERROG EVL PM/LDLS PM: CPT | Performed by: INTERNAL MEDICINE

## 2021-12-18 PROCEDURE — 93296 REM INTERROG EVL PM/IDS: CPT | Performed by: INTERNAL MEDICINE

## 2022-01-11 ENCOUNTER — TELEPHONE (OUTPATIENT)
Dept: INTERNAL MEDICINE CLINIC | Age: 87
End: 2022-01-11

## 2022-01-11 NOTE — TELEPHONE ENCOUNTER
Pt's daughter Wild Joseph called. She said Eliquis will cost over $400. She asked if the doctor would give him some samples for a couple of weeks or a month. Call Wild Joseph @[phone].

## 2022-01-24 RX ORDER — SODIUM CHLORIDE 0.9 % (FLUSH) 0.9 %
5-40 SYRINGE (ML) INJECTION PRN
Status: CANCELLED | OUTPATIENT
Start: 2022-02-02

## 2022-01-24 RX ORDER — ONDANSETRON 2 MG/ML
8 INJECTION INTRAMUSCULAR; INTRAVENOUS
Status: CANCELLED | OUTPATIENT
Start: 2022-02-02

## 2022-01-24 RX ORDER — SODIUM CHLORIDE 9 MG/ML
INJECTION, SOLUTION INTRAVENOUS CONTINUOUS
Status: CANCELLED | OUTPATIENT
Start: 2022-02-02

## 2022-01-24 RX ORDER — DIPHENHYDRAMINE HYDROCHLORIDE 50 MG/ML
50 INJECTION INTRAMUSCULAR; INTRAVENOUS
Status: CANCELLED | OUTPATIENT
Start: 2022-02-02

## 2022-01-24 RX ORDER — ALBUTEROL SULFATE 90 UG/1
4 AEROSOL, METERED RESPIRATORY (INHALATION) PRN
Status: CANCELLED | OUTPATIENT
Start: 2022-02-02

## 2022-01-24 RX ORDER — MEPERIDINE HYDROCHLORIDE 25 MG/ML
12.5 INJECTION INTRAMUSCULAR; INTRAVENOUS; SUBCUTANEOUS PRN
Status: CANCELLED | OUTPATIENT
Start: 2022-02-02

## 2022-01-24 RX ORDER — EPINEPHRINE 1 MG/ML
0.3 INJECTION, SOLUTION, CONCENTRATE INTRAVENOUS PRN
Status: CANCELLED | OUTPATIENT
Start: 2022-02-02

## 2022-01-24 RX ORDER — ACETAMINOPHEN 325 MG/1
650 TABLET ORAL
Status: CANCELLED | OUTPATIENT
Start: 2022-02-02

## 2022-01-24 RX ORDER — SODIUM CHLORIDE 9 MG/ML
25 INJECTION, SOLUTION INTRAVENOUS PRN
Status: CANCELLED | OUTPATIENT
Start: 2022-02-02

## 2022-02-02 ENCOUNTER — HOSPITAL ENCOUNTER (OUTPATIENT)
Dept: ULTRASOUND IMAGING | Age: 87
Discharge: HOME | End: 2022-02-02
Payer: MEDICARE

## 2022-02-02 DIAGNOSIS — N18.32 CHRONIC KIDNEY DISEASE (CKD) STAGE G3B/A1, MODERATELY DECREASED GLOMERULAR FILTRATION RATE (GFR) BETWEEN 30-44 ML/MIN/1.73 SQUARE METER AND ALBUMINURIA CREATININE RATIO LESS THAN 30 MG/G (HCC): ICD-10-CM

## 2022-02-02 PROCEDURE — 76770 US EXAM ABDO BACK WALL COMP: CPT

## 2022-02-08 RX ORDER — METOPROLOL SUCCINATE 25 MG/1
TABLET, EXTENDED RELEASE ORAL
Qty: 90 TABLET | Refills: 1 | Status: SHIPPED | OUTPATIENT
Start: 2022-02-08

## 2022-02-08 NOTE — TELEPHONE ENCOUNTER
LAST OFFICE VISIT : 11/16/2021    Future Appointments   Date Time Provider Davey Winkler   3/17/2022  9:15 AM SCHEDULE, [facility] REMOTE TRANSMISSION [facility]   6/16/2022  9:15 AM SCHEDULE, [facility] REMOTE TRANSMISSION [facility]

## 2022-02-21 ENCOUNTER — TELEPHONE (OUTPATIENT)
Dept: INTERNAL MEDICINE CLINIC | Age: 87
End: 2022-02-21

## 2022-02-21 NOTE — TELEPHONE ENCOUNTER
Pt was found  at 10:04 am this morning. They need a call with medical info to determine if this a 's case.